# Patient Record
Sex: MALE | Race: WHITE | NOT HISPANIC OR LATINO | Employment: OTHER | URBAN - METROPOLITAN AREA
[De-identification: names, ages, dates, MRNs, and addresses within clinical notes are randomized per-mention and may not be internally consistent; named-entity substitution may affect disease eponyms.]

---

## 2017-01-22 ENCOUNTER — TELEPHONE (OUTPATIENT)
Dept: NURSING | Facility: CLINIC | Age: 37
End: 2017-01-22

## 2017-01-22 NOTE — TELEPHONE ENCOUNTER
"Call Type: Triage Call    Presenting Problem: Neck pain when turn neck to the right ; that  started  this morning  , after crackling sensation from stretching .    Triage Note:  Guideline Title: Neck Pain  Recommended Disposition: See Provider within 72 Hours  Original Inclination: Did not know what to do  Override Disposition:  Intended Action: See /Naeem Appt  Physician Contacted: No  Neck pain and decreased range of motion in neck, shoulder, or arm AND has not  responded to 48 hours of home care ?  YES  Painful involuntary spasm of neck or jaw WITHOUT injury ? NO  Severe breathing problems ? NO  Injury to the neck ? NO  New or worsening signs and symptoms that may indicate shock ? NO  Neck mass/swelling ? NO  Head injury ? NO  Neck pain (no injury) AND any temperature elevation in an immunocompromised  individual or frail elderly ? NO  Choking sensation, cannot swallow own saliva with associated drooling and soft  muffled voice ? NO  Seizure now or within last 6 hours ? NO  New onset or unexplained change in bowel or bladder control (unable to urinate and  full feeling or loss of control of bowel or bladder) ? NO  Any other cardiac signs/symptoms for more than 5 minutes, now or within last hour.  Pain is NOT associated with taking a deep breath or a productive cough, movement,  or touch to a localized area on the chest or upper body. ? NO  Spasm or pain WITHOUT injury AND current or recent use of phenothiazines  (Compazine, Thorazine, Mellaril, Prolixin, Loxitane, Haldol, etc.) ? NO  Neck pain AND symptoms of viral illness that are not improved OR are getting worse  with 24 hours of home care ? NO  Sudden, severe disabling head pain OR caller spontaneously verbalizes \"worst  headache of my life\" ? NO  New onset of neck pain with forward head movement (no injury) AND severe  generalized headache, fever, or altered mental status ? NO  New onset of unbearable pain within last 24 hours ? NO  Unexplained " blood-colored (purple or red) flat pinpoint dots, spots or patches on  the skin ? NO  Physician Instructions:  Care Advice: Call provider if symptoms worsen or new symptoms develop.  Maintain good posture. Avoid putting pressure on a nerve by not carrying  heavy objects such as computer case or backpack. Avoid overuse activities -  alternate activities by switching sides and limit length of activity. Avoid  lifting heavy objects.  SYMPTOM / CONDITION MANAGEMENT  For some relief, try using a heating pad on low or medium for 15 - 20  minutes every 2 or 3 hours or take a warm shower.  A microwave heating pad  can also be used to provide warm moist heat.

## 2017-01-26 ENCOUNTER — OFFICE VISIT (OUTPATIENT)
Dept: FAMILY MEDICINE | Facility: CLINIC | Age: 37
End: 2017-01-26
Payer: MEDICARE

## 2017-01-26 VITALS
HEIGHT: 75 IN | DIASTOLIC BLOOD PRESSURE: 82 MMHG | TEMPERATURE: 98.7 F | HEART RATE: 88 BPM | RESPIRATION RATE: 17 BRPM | OXYGEN SATURATION: 98 % | BODY MASS INDEX: 33.31 KG/M2 | SYSTOLIC BLOOD PRESSURE: 130 MMHG | WEIGHT: 267.9 LBS

## 2017-01-26 DIAGNOSIS — R05.9 COUGH: ICD-10-CM

## 2017-01-26 DIAGNOSIS — J06.9 UPPER RESPIRATORY TRACT INFECTION, UNSPECIFIED TYPE: Primary | ICD-10-CM

## 2017-01-26 PROCEDURE — 99213 OFFICE O/P EST LOW 20 MIN: CPT | Performed by: PHYSICIAN ASSISTANT

## 2017-01-26 RX ORDER — AZITHROMYCIN 250 MG/1
TABLET, FILM COATED ORAL
Qty: 6 TABLET | Refills: 0 | Status: SHIPPED | OUTPATIENT
Start: 2017-01-26 | End: 2017-08-30

## 2017-01-26 RX ORDER — BENZONATATE 200 MG/1
200 CAPSULE ORAL 3 TIMES DAILY PRN
Qty: 21 CAPSULE | Refills: 0 | Status: SHIPPED | OUTPATIENT
Start: 2017-01-26 | End: 2017-08-30

## 2017-01-26 NOTE — Clinical Note
[facility]  [address]  [facility] [address]  [phone]  Dept: [phone]      1/26/2017    Re: Rafa Cochran      TO WHOM IT MAY CONCERN:    Rafa Cochran  was seen on 1/2617.  Please excuse him today due to illness.    Cordially    Jose Michel PA-C  [facility]

## 2017-01-26 NOTE — PROGRESS NOTES
"  SUBJECTIVE:                                                    Rafa oCchran is a 36 year old male who presents to clinic today for the following health issues:      RESPIRATORY SYMPTOMS      Duration: 1-1.5 weeks    Description  nasal congestion, rhinorrhea, cough, fever and fatigue/malaise- breathing has become more difficult.    Severity: moderate    Accompanying signs and symptoms: None    History (predisposing factors):  none    Precipitating or alleviating factors: None    Therapies tried and outcome:  none           Problem list and histories reviewed & adjusted, as indicated.  Additional history: 37 y/o male c/o not feeling well for the last 2 weeks.  Admits to the above symptoms with cough, congestion being the worst.  He is very busy with work and school, and has not been feeling well.  He feels that his anxiety has been worse with not feeling well.      Problem list, Medication list, Allergies, and Medical/Social/Surgical histories reviewed in EPIC and updated as appropriate.    ROS:  Constitutional, HEENT, cardiovascular, pulmonary, gi and gu systems are negative, except as otherwise noted.    OBJECTIVE:                                                    /82 mmHg  Pulse 88  Temp(Src) 98.7  F (37.1  C) (Oral)  Resp 17  Ht 6' 3\" (1.905 m)  Wt 267 lb 14.4 oz (121.519 kg)  BMI 33.49 kg/m2  SpO2 98%  Body mass index is 33.49 kg/(m^2).  GENERAL: alert and no distress  EYES: Eyes grossly normal to inspection  HENT: normal cephalic/atraumatic, ear canals and TM's normal, nasal mucosa edematous , rhinorrhea yellow, oropharynx clear and oral mucous membranes moist  RESP: rhonchi throughout  CV: regular rate and rhythm, normal S1 S2, no S3 or S4, no murmur, click or rub, no peripheral edema and peripheral pulses strong    Diagnostic Test Results:  none      ASSESSMENT/PLAN:                                                            1. Upper respiratory tract infection, unspecified type  Will treat " based on time frame.  - azithromycin (ZITHROMAX) 250 MG tablet; Two tablets first day, then one tablet daily for four days.  Dispense: 6 tablet; Refill: 0  - benzonatate (TESSALON) 200 MG capsule; Take 1 capsule (200 mg) by mouth 3 times daily as needed for cough  Dispense: 21 capsule; Refill: 0    2. Cough    - azithromycin (ZITHROMAX) 250 MG tablet; Two tablets first day, then one tablet daily for four days.  Dispense: 6 tablet; Refill: 0  - benzonatate (TESSALON) 200 MG capsule; Take 1 capsule (200 mg) by mouth 3 times daily as needed for cough  Dispense: 21 capsule; Refill: 0    Follow up if symptoms persist or worsen     Jose Michel PA-C  [facility]

## 2017-01-26 NOTE — MR AVS SNAPSHOT
"              After Visit Summary   1/26/2017    Rafa Cochran    MRN: [id number]           Patient Information     Date Of Birth          1980        Visit Information        Provider Department      1/26/2017 11:00 AM Jose Michel PA-C [facility]        Today's Diagnoses     Upper respiratory tract infection, unspecified type    -  1     Cough            Follow-ups after your visit        Who to contact     If you have questions or need follow up information about today's clinic visit or your schedule please contact [facility] directly at [phone].  Normal or non-critical lab and imaging results will be communicated to you by Flagrhart, letter or phone within 4 business days after the clinic has received the results. If you do not hear from us within 7 days, please contact the clinic through Identiat or phone. If you have a critical or abnormal lab result, we will notify you by phone as soon as possible.  Submit refill requests through [employer]iv[employer] or call your pharmacy and they will forward the refill request to us. Please allow 3 business days for your refill to be completed.          Additional Information About Your Visit        MyChart Information     Tivra gives you secure access to your electronic health record. If you see a primary care provider, you can also send messages to your care team and make appointments. If you have questions, please call your primary care clinic.  If you do not have a primary care provider, please call [phone] and they will assist you.        Care EveryWhere ID     This is your Care EveryWhere ID. This could be used by other organizations to access your [place] medical records  [id number]        Your Vitals Were     Pulse Temperature Respirations Height BMI (Body Mass Index) Pulse Oximetry    88 98.7  F (37.1  C) (Oral) 17 6' 3\" (1.905 m) 33.49 kg/m2 98%       Blood Pressure from Last 3 Encounters:   01/26/17 130/82   07/06/16 120/70 "   05/19/16 124/70    Weight from Last 3 Encounters:   01/26/17 267 lb 14.4 oz (121.519 kg)   07/06/16 239 lb 8 oz (108.636 kg)   05/19/16 243 lb (110.224 kg)              Today, you had the following     No orders found for display         Today's Medication Changes          These changes are accurate as of: 1/26/17 11:14 AM.  If you have any questions, ask your nurse or doctor.               Start taking these medicines.        Dose/Directions    azithromycin 250 MG tablet   Commonly known as:  ZITHROMAX   Used for:  Upper respiratory tract infection, unspecified type, Cough   Started by:  Jose Michel PA-C        Two tablets first day, then one tablet daily for four days.   Quantity:  6 tablet   Refills:  0       benzonatate 200 MG capsule   Commonly known as:  TESSALON   Used for:  Upper respiratory tract infection, unspecified type, Cough   Started by:  Jose Michel PA-C        Dose:  200 mg   Take 1 capsule (200 mg) by mouth 3 times daily as needed for cough   Quantity:  21 capsule   Refills:  0            Where to get your medicines      These medications were sent to [facility] - [place], [place] - [facility] [address] AT [facility] & [address] , [facility] [address]     Phone:  [phone]    - azithromycin 250 MG tablet  - benzonatate 200 MG capsule             Primary Care Provider Office Phone # Fax #    Linda Payne [phone]       [address] [address]        Thank you!     Thank you for choosing [facility]  for your care. Our goal is always to provide you with excellent care. Hearing back from our patients is one way we can continue to improve our services. Please take a few minutes to complete the written survey that you may receive in the mail after your visit with us. Thank you!             Your Updated Medication List - Protect others around you: Learn  how to safely use, store and throw away your medicines at www.disposemymeds.org.          This list is accurate as of: 1/26/17 11:14 AM.  Always use your most recent med list.                   Brand Name Dispense Instructions for use    azithromycin 250 MG tablet    ZITHROMAX    6 tablet    Two tablets first day, then one tablet daily for four days.       benzonatate 200 MG capsule    TESSALON    21 capsule    Take 1 capsule (200 mg) by mouth 3 times daily as needed for cough       LORazepam 2 MG tablet    ATIVAN    30 tablet    Take  by mouth 4 times daily.       mirtazapine 15 MG tablet    REMERON    90 tablet    Take 2 tablets (30 mg) by mouth At Bedtime       order for DME      autoCPAP 8cm

## 2017-01-26 NOTE — NURSING NOTE
"Chief Complaint   Patient presents with     URI     Cough x1-1.5 weeks     /82 mmHg  Pulse 88  Temp(Src) 98.7  F (37.1  C) (Oral)  Resp 17  Ht 6' 3\" (1.905 m)  Wt 267 lb 14.4 oz (121.519 kg)  BMI 33.49 kg/m2  SpO2 98% Estimated body mass index is 33.49 kg/(m^2) as calculated from the following:    Height as of this encounter: 6' 3\" (1.905 m).    Weight as of this encounter: 267 lb 14.4 oz (121.519 kg).  bp completed using cuff size: large  right        Health Maintenance Due Pending Provider Review:  PHQ9    Patient will complete PHQ-9 when he feels better.    Jonna Jha MA  [facility]    "

## 2017-08-30 ENCOUNTER — OFFICE VISIT (OUTPATIENT)
Dept: FAMILY MEDICINE | Facility: CLINIC | Age: 37
End: 2017-08-30
Payer: MEDICARE

## 2017-08-30 VITALS
BODY MASS INDEX: 33.2 KG/M2 | HEIGHT: 75 IN | SYSTOLIC BLOOD PRESSURE: 120 MMHG | TEMPERATURE: 98 F | OXYGEN SATURATION: 96 % | HEART RATE: 112 BPM | RESPIRATION RATE: 16 BRPM | DIASTOLIC BLOOD PRESSURE: 70 MMHG | WEIGHT: 267 LBS

## 2017-08-30 DIAGNOSIS — Z13.220 SCREENING FOR CHOLESTEROL LEVEL: ICD-10-CM

## 2017-08-30 DIAGNOSIS — J06.9 UPPER RESPIRATORY TRACT INFECTION, UNSPECIFIED TYPE: Primary | ICD-10-CM

## 2017-08-30 LAB
ERYTHROCYTE [DISTWIDTH] IN BLOOD BY AUTOMATED COUNT: 13.1 % (ref 10–15)
HCT VFR BLD AUTO: 41.4 % (ref 40–53)
HETEROPH AB SER QL: NEGATIVE
HGB BLD-MCNC: 14.3 G/DL (ref 13.3–17.7)
MCH RBC QN AUTO: 30 PG (ref 26.5–33)
MCHC RBC AUTO-ENTMCNC: 34.5 G/DL (ref 31.5–36.5)
MCV RBC AUTO: 87 FL (ref 78–100)
PLATELET # BLD AUTO: 310 10E9/L (ref 150–450)
RBC # BLD AUTO: 4.77 10E12/L (ref 4.4–5.9)
WBC # BLD AUTO: 11 10E9/L (ref 4–11)

## 2017-08-30 PROCEDURE — 86308 HETEROPHILE ANTIBODY SCREEN: CPT | Performed by: NURSE PRACTITIONER

## 2017-08-30 PROCEDURE — 85027 COMPLETE CBC AUTOMATED: CPT | Performed by: NURSE PRACTITIONER

## 2017-08-30 PROCEDURE — 99213 OFFICE O/P EST LOW 20 MIN: CPT | Performed by: NURSE PRACTITIONER

## 2017-08-30 PROCEDURE — 36415 COLL VENOUS BLD VENIPUNCTURE: CPT | Performed by: NURSE PRACTITIONER

## 2017-08-30 RX ORDER — MIRTAZAPINE 30 MG/1
TABLET, FILM COATED ORAL
Refills: 3 | COMMUNITY
Start: 2017-08-20 | End: 2019-11-19

## 2017-08-30 ASSESSMENT — PATIENT HEALTH QUESTIONNAIRE - PHQ9
SUM OF ALL RESPONSES TO PHQ QUESTIONS 1-9: 6
5. POOR APPETITE OR OVEREATING: NEARLY EVERY DAY

## 2017-08-30 ASSESSMENT — ANXIETY QUESTIONNAIRES
2. NOT BEING ABLE TO STOP OR CONTROL WORRYING: MORE THAN HALF THE DAYS
GAD7 TOTAL SCORE: 16
IF YOU CHECKED OFF ANY PROBLEMS ON THIS QUESTIONNAIRE, HOW DIFFICULT HAVE THESE PROBLEMS MADE IT FOR YOU TO DO YOUR WORK, TAKE CARE OF THINGS AT HOME, OR GET ALONG WITH OTHER PEOPLE: NOT DIFFICULT AT ALL
6. BECOMING EASILY ANNOYED OR IRRITABLE: NEARLY EVERY DAY
7. FEELING AFRAID AS IF SOMETHING AWFUL MIGHT HAPPEN: NEARLY EVERY DAY
5. BEING SO RESTLESS THAT IT IS HARD TO SIT STILL: NOT AT ALL
1. FEELING NERVOUS, ANXIOUS, OR ON EDGE: NEARLY EVERY DAY
3. WORRYING TOO MUCH ABOUT DIFFERENT THINGS: MORE THAN HALF THE DAYS

## 2017-08-30 NOTE — NURSING NOTE
"Chief Complaint   Patient presents with     URI       Initial /70 (BP Location: Right arm, Patient Position: Sitting, Cuff Size: Adult Large)  Pulse 112  Temp 98  F (36.7  C) (Oral)  Resp 16  Ht 1.905 m (6' 3\")  Wt 121.1 kg (267 lb)  SpO2 96%  BMI 33.37 kg/m2 Estimated body mass index is 33.37 kg/(m^2) as calculated from the following:    Height as of this encounter: 1.905 m (6' 3\").    Weight as of this encounter: 121.1 kg (267 lb).  Medication Reconciliation: ivette Douglas        "

## 2017-08-30 NOTE — LETTER
My Depression Action Plan  Name: Rafa Cochran   Date of Birth 1980  Date: 8/30/2017    My doctor: Linda Payne   My clinic: [address] [address] [phone]          GREEN    ZONE   Good Control    What it looks like:     Things are going generally well. You have normal up s and down s. You may even feel depressed from time to time, but bad moods usually last less than a day.   What you need to do:  1. Continue to care for yourself (see self care plan)  2. Check your depression survival kit and update it as needed  3. Follow your physician s recommendations including any medication.  4. Do not stop taking medication unless you consult with your physician first.           YELLOW         ZONE Getting Worse    What it looks like:     Depression is starting to interfere with your life.     It may be hard to get out of bed; you may be starting to isolate yourself from others.    Symptoms of depression are starting to last most all day and this has happened for several days.     You may have suicidal thoughts but they are not constant.   What you need to do:     1. Call your care team, your response to treatment will improve if you keep your care team informed of your progress. Yellow periods are signs an adjustment may need to be made.     2. Continue your self-care, even if you have to fake it!    3. Talk to someone in your support network    4. Open up your depression survival kit           RED    ZONE Medical Alert - Get Help    What it looks like:     Depression is seriously interfering with your life.     You may experience these or other symptoms: You can t get out of bed most days, can t work or engage in other necessary activities, you have trouble taking care of basic hygiene, or basic responsibilities, thoughts of suicide or death that will not go away, self-injurious behavior.     What you need to do:  1. Call your care  team and request a same-day appointment. If they are not available (weekends or after hours) call your local crisis line, emergency room or 911.      Electronically signed by: Cherrie Douglas, August 30, 2017    Depression Self Care Plan / Survival Kit    Self-Care for Depression  Here s the deal. Your body and mind are really not as separate as most people think.  What you do and think affects how you feel and how you feel influences what you do and think. This means if you do things that people who feel good do, it will help you feel better.  Sometimes this is all it takes.  There is also a place for medication and therapy depending on how severe your depression is, so be sure to consult with your medical provider and/ or Behavioral Health Consultant if your symptoms are worsening or not improving.     In order to better manage my stress, I will:    Exercise  Get some form of exercise, every day. This will help reduce pain and release endorphins, the  feel good  chemicals in your brain. This is almost as good as taking antidepressants!  This is not the same as joining a gym and then never going! (they count on that by the way ) It can be as simple as just going for a walk or doing some gardening, anything that will get you moving.      Hygiene   Maintain good hygiene (Get out of bed in the morning, Make your bed, Brush your teeth, Take a shower, and Get dressed like you were going to work, even if you are unemployed).  If your clothes don't fit try to get ones that do.    Diet  I will strive to eat foods that are good for me, drink plenty of water, and avoid excessive sugar, caffeine, alcohol, and other mood-altering substances.  Some foods that are helpful in depression are: complex carbohydrates, B vitamins, flaxseed, fish or fish oil, fresh fruits and vegetables.    Psychotherapy  I agree to participate in Individual Therapy (if recommended).    Medication  If prescribed medications, I agree to take them.   Missing doses can result in serious side effects.  I understand that drinking alcohol, or other illicit drug use, may cause potential side effects.  I will not stop my medication abruptly without first discussing it with my provider.    Staying Connected With Others  I will stay in touch with my friends, family members, and my primary care provider/team.    Use your imagination  Be creative.  We all have a creative side; it doesn t matter if it s oil painting, sand castles, or mud pies! This will also kick up the endorphins.    Witness Beauty  (AKA stop and smell the roses) Take a look outside, even in mid-winter. Notice colors, textures. Watch the squirrels and birds.     Service to others  Be of service to others.  There is always someone else in need.  By helping others we can  get out of ourselves  and remember the really important things.  This also provides opportunities for practicing all the other parts of the program.    Humor  Laugh and be silly!  Adjust your TV habits for less news and crime-drama and more comedy.    Control your stress  Try breathing deep, massage therapy, biofeedback, and meditation. Find time to relax each day.     My support system    Clinic Contact:  Phone number:    Contact 1:  Phone number:    Contact 2:  Phone number:    Restorationist/:  Phone number:    Therapist:  Phone number:    Local crisis center:    Phone number:    Other community support:  Phone number:

## 2017-08-30 NOTE — MR AVS SNAPSHOT
After Visit Summary   8/30/2017    Rafa Cochran    MRN: [id number]           Patient Information     Date Of Birth          1980        Visit Information        Provider Department      8/30/2017 3:00 PM Francoise An NP [facility]        Today's Diagnoses     Upper respiratory tract infection, unspecified type    -  1    Screening for cholesterol level           Follow-ups after your visit        Future tests that were ordered for you today     Open Future Orders        Priority Expected Expires Ordered    Lipid panel reflex to direct LDL Routine  8/30/2018 8/30/2017            Who to contact     If you have questions or need follow up information about today's clinic visit or your schedule please contact [facility] directly at [phone].  Normal or non-critical lab and imaging results will be communicated to you by MyChart, letter or phone within 4 business days after the clinic has received the results. If you do not hear from us within 7 days, please contact the clinic through [employer] Caf[employer] or phone. If you have a critical or abnormal lab result, we will notify you by phone as soon as possible.  Submit refill requests through [employer] or call your pharmacy and they will forward the refill request to us. Please allow 3 business days for your refill to be completed.          Additional Information About Your Visit        MyChart Information     [employer] gives you secure access to your electronic health record. If you see a primary care provider, you can also send messages to your care team and make appointments. If you have questions, please call your primary care clinic.  If you do not have a primary care provider, please call [phone] and they will assist you.        Care EveryWhere ID     This is your Care EveryWhere ID. This could be used by other organizations to access your [place] medical records  [id number]        Your Vitals Were     Pulse Temperature  "Respirations Height Pulse Oximetry BMI (Body Mass Index)    112 98  F (36.7  C) (Oral) 16 1.905 m (6' 3\") 96% 33.37 kg/m2       Blood Pressure from Last 3 Encounters:   08/30/17 120/70   01/26/17 130/82   07/06/16 120/70    Weight from Last 3 Encounters:   08/30/17 121.1 kg (267 lb)   01/26/17 121.5 kg (267 lb 14.4 oz)   07/06/16 108.6 kg (239 lb 8 oz)              We Performed the Following     CBC with platelets     Mononucleosis screen        Primary Care Provider Office Phone # Fax #    Linda Payne [phone] [address] [address]        Equal Access to Services     [facility] : Hadii alonso davidson Socharity, waaxda luqadaha, qaybta kaalmada jaime, elisabeth ross . So [facility] [phone].    ATENCIÓN: Si habla español, tiene a marin disposición servicios gratuitos de asistencia lingüística. Randa al [phone].    We comply with applicable federal civil rights laws and [place] laws. We do not discriminate on the basis of race, color, national origin, age, disability sex, sexual orientation or gender identity.            Thank you!     Thank you for choosing [facility]  for your care. Our goal is always to provide you with excellent care. Hearing back from our patients is one way we can continue to improve our services. Please take a few minutes to complete the written survey that you may receive in the mail after your visit with us. Thank you!             Your Updated Medication List - Protect others around you: Learn how to safely use, store and throw away your medicines at www.disposemymeds.org.          This list is accurate as of: 8/30/17  3:26 PM.  Always use your most recent med list.                   Brand Name Dispense Instructions for use Diagnosis    LORazepam 2 MG tablet    ATIVAN    30 tablet    Take  by mouth 4 times daily.        mirtazapine 30 MG tablet    REMERON     TK 1 T PO HS        " order for DME      autoCPAP 8cm

## 2017-08-30 NOTE — PROGRESS NOTES
SUBJECTIVE:   Rafa Cochran is a 37 year old male who presents to clinic today for the following health issues:      Acute Illness   Acute illness concerns: URI  Onset: 3 days ago    Fever: no    Chills/Sweats: YES    Headache (location?): YES    Sinus Pressure:YES    Conjunctivitis:  no    Ear Pain: YES: left    Rhinorrhea: YES    Congestion: YES    Sore Throat: YES     Cough: YES    Wheeze: no    Decreased Appetite: no    Nausea: no    Vomiting: no    Diarrhea:  no    Dysuria/Freq.: no    Fatigue/Achiness: YES    Sick/Strep Exposure: no     Therapies Tried and outcome: tylenol helped a little bit    [employer] school: lots of students sick. Yellow phlegm coughing. No SOB/chest pain. Slight swollen on left neck, woke up like that, and slightly painful on left ear.        Problem list and histories reviewed & adjusted, as indicated.  Additional history: as documented    Patient Active Problem List   Diagnosis     Major depression in partial remission (H)     Bipolar affective disorder (H)     OCD (obsessive compulsive disorder)     Schizoaffective disorder (H)     Sinus tachycardia     Palpitations     Hyperlipidemia LDL goal <160     Hypertriglyceridemia     Prediabetes     Panic attack     BABATUNDE (obstructive sleep apnea)- moderate (AHI 17)     Pain in joint of right shoulder     Sprain and strain of shoulder and upper arm, left, initial encounter     Shoulder pain, left     Past Surgical History:   Procedure Laterality Date     APPENDECTOMY         Social History   Substance Use Topics     Smoking status: Former Smoker     Quit date: 12/25/2013     Smokeless tobacco: Never Used      Comment: 5 cigs per day     Alcohol use 0.0 oz/week     0 Standard drinks or equivalent per week      Comment: rare use     Family History   Problem Relation Age of Onset     C.A.D. Maternal Grandfather      DIABETES Maternal Grandfather      Hypertension Maternal Grandfather      HEART DISEASE Maternal Grandfather      C.A.D. Paternal  "Grandmother      DIABETES Paternal Grandmother      Circulatory Paternal Grandmother      GASTROINTESTINAL DISEASE Paternal Grandmother      C.A.D. Paternal Grandfather      Hypertension Paternal Grandfather      Circulatory Paternal Grandfather      HEART DISEASE Paternal Grandfather      GASTROINTESTINAL DISEASE Paternal Grandfather      Hypertension Father      Alcohol/Drug Father      Allergies Father      Depression Father      GASTROINTESTINAL DISEASE Father      Prostate Cancer Father      CEREBROVASCULAR DISEASE Maternal Grandmother      Arthritis Maternal Grandmother      HEART DISEASE Maternal Grandmother      Alcohol/Drug Mother      Allergies Mother      Circulatory Mother      Depression Mother      HEART DISEASE Mother      DIABETES Mother      Alcohol/Drug Sister      Allergies Sister      Depression Sister      Genitourinary Problems Sister          Current Outpatient Prescriptions   Medication Sig Dispense Refill     mirtazapine (REMERON) 30 MG tablet TK 1 T PO HS  3     ORDER FOR DME autoCPAP 8cm       lorazepam (ATIVAN) 2 MG tablet Take  by mouth 4 times daily. 30 tablet 0     [DISCONTINUED] mirtazapine (REMERON) 15 MG tablet Take 2 tablets (30 mg) by mouth At Bedtime 90 tablet 0     Allergies   Allergen Reactions     Nicotine Rash     Nicotine Patches     Pertussis Toxoid      Bad reaction as a child         Reviewed and updated as needed this visit by clinical staffTobacco  Allergies  Meds  Problems  Med Hx  Surg Hx  Fam Hx  Soc Hx        Reviewed and updated as needed this visit by Provider  Allergies  Meds  Problems         ROS:  Constitutional, HEENT, cardiovascular, pulmonary systems are negative, except as otherwise noted.      OBJECTIVE:   /70 (BP Location: Right arm, Patient Position: Sitting, Cuff Size: Adult Large)  Pulse 112  Temp 98  F (36.7  C) (Oral)  Resp 16  Ht 1.905 m (6' 3\")  Wt 121.1 kg (267 lb)  SpO2 96%  BMI 33.37 kg/m2  Body mass index is 33.37 " kg/(m^2).  GENERAL: tired, alert and no distress  EYES: Eyes grossly normal to inspection, PERRL and conjunctivae and sclerae normal  HENT: ear canals and TM's normal, nose and mouth without ulcers or lesions. Posterior pharynx slight erythema. No sinus pressure. Left ear slight tender no drainage  NECK: no adenopathy, no asymmetry, masses, or scars and thyroid difficult to palpate due to body habitus. Left neck slightly more swollen than right, slightly tender   RESP: lungs clear to diminished to auscultation - no rales, rhonchi or wheezes  CV: regular rate and rhythm, normal S1 S2, no S3 or S4, no murmur, click or rub  MS: no gross musculoskeletal defects noted, no edema    Diagnostic Test Results:  none     ASSESSMENT/PLAN:         1. Upper respiratory tract infection, unspecified type  Likely viral but check for mono and elevated WBC. Call if symptoms not improving over next few days and can start antibiotic then. Stay home from school tomorrow if you can, rest.   - CBC with platelets  - Mononucleosis screen    2. Screening for cholesterol level  Check in future  - Lipid panel reflex to direct LDL; Future    FUTURE APPOINTMENTS:       - Follow-up visit prn not improving    NAS Smith, NP-C  [facility]

## 2017-08-31 ASSESSMENT — ANXIETY QUESTIONNAIRES: GAD7 TOTAL SCORE: 16

## 2017-09-04 ENCOUNTER — NURSE TRIAGE (OUTPATIENT)
Dept: NURSING | Facility: CLINIC | Age: 37
End: 2017-09-04

## 2017-09-04 NOTE — TELEPHONE ENCOUNTER
"\"I was seen on 8/30/17 and was told to call back if my symptoms are not better within a few days.  I would like an antibiotic..\"  Per patient's request, writer paged on-call Dr. Mcguire at 5:38pm via Smart Web to [phone].  A second page was sent at 5:57pm via Smart Web.  Dr. Mcguire returned FNA call at 6:00pm, states patient will need to call the clinic tomorrow morning with this request.     Cesilia Klein RN/FNA    "

## 2017-09-11 ENCOUNTER — OFFICE VISIT (OUTPATIENT)
Dept: FAMILY MEDICINE | Facility: CLINIC | Age: 37
End: 2017-09-11
Payer: MEDICARE

## 2017-09-11 VITALS
HEART RATE: 117 BPM | TEMPERATURE: 98.9 F | DIASTOLIC BLOOD PRESSURE: 70 MMHG | WEIGHT: 270.5 LBS | OXYGEN SATURATION: 95 % | BODY MASS INDEX: 33.81 KG/M2 | SYSTOLIC BLOOD PRESSURE: 120 MMHG | RESPIRATION RATE: 14 BRPM

## 2017-09-11 DIAGNOSIS — J06.9 UPPER RESPIRATORY TRACT INFECTION, UNSPECIFIED TYPE: Primary | ICD-10-CM

## 2017-09-11 DIAGNOSIS — R07.0 THROAT PAIN: ICD-10-CM

## 2017-09-11 LAB
DEPRECATED S PYO AG THROAT QL EIA: NORMAL
SPECIMEN SOURCE: NORMAL

## 2017-09-11 PROCEDURE — 99213 OFFICE O/P EST LOW 20 MIN: CPT | Performed by: NURSE PRACTITIONER

## 2017-09-11 PROCEDURE — 87081 CULTURE SCREEN ONLY: CPT | Performed by: NURSE PRACTITIONER

## 2017-09-11 PROCEDURE — 87880 STREP A ASSAY W/OPTIC: CPT | Performed by: NURSE PRACTITIONER

## 2017-09-11 RX ORDER — AMOXICILLIN AND CLAVULANATE POTASSIUM 500; 125 MG/1; MG/1
1 TABLET, FILM COATED ORAL 2 TIMES DAILY
Qty: 14 TABLET | Refills: 0 | Status: SHIPPED | OUTPATIENT
Start: 2017-09-11 | End: 2017-09-18

## 2017-09-11 NOTE — PROGRESS NOTES
SUBJECTIVE:   Rafa Cochran is a 37 year old male who presents to clinic today for the following health issues:      Acute Illness   Acute illness concerns: cough  Onset: 3 weeks ago    Fever: no    Chills/Sweats: YES    Headache (location?): YES    Sinus Pressure:YES    Conjunctivitis:  no    Ear Pain: no    Rhinorrhea: YES    Congestion: YES    Sore Throat: YES     Cough: YES    Wheeze: YES    Decreased Appetite: no    Nausea: YES    Vomiting: no    Diarrhea:  no    Dysuria/Freq.: no    Fatigue/Achiness: YES    Sick/Strep Exposure: no     Therapies Tried and outcome: tylenol which helps a little bit    Saw provider on 8/30/17 with similar symptoms. Last week felt like it was getting a little better, now this week seems to be worsening. Exhausted, weak. Feels like he is eating/drinking enough. Has chest pain with coughing-lungs feel achy from coughing. Throat hurts more this time also. He feels miserable.       Problem list and histories reviewed & adjusted, as indicated.  Additional history: as documented    Patient Active Problem List   Diagnosis     Major depression in partial remission (H)     Bipolar affective disorder (H)     OCD (obsessive compulsive disorder)     Schizoaffective disorder (H)     Sinus tachycardia     Palpitations     Hyperlipidemia LDL goal <160     Hypertriglyceridemia     Prediabetes     Panic attack     BABATUNDE (obstructive sleep apnea)- moderate (AHI 17)     Pain in joint of right shoulder     Sprain and strain of shoulder and upper arm, left, initial encounter     Shoulder pain, left     Past Surgical History:   Procedure Laterality Date     APPENDECTOMY         Social History   Substance Use Topics     Smoking status: Former Smoker     Quit date: 12/25/2013     Smokeless tobacco: Never Used      Comment: 5 cigs per day     Alcohol use 0.0 oz/week     0 Standard drinks or equivalent per week      Comment: rare use     Family History   Problem Relation Age of Onset     C.A.D. Maternal  Grandfather      DIABETES Maternal Grandfather      Hypertension Maternal Grandfather      HEART DISEASE Maternal Grandfather      C.A.D. Paternal Grandmother      DIABETES Paternal Grandmother      Circulatory Paternal Grandmother      GASTROINTESTINAL DISEASE Paternal Grandmother      C.A.D. Paternal Grandfather      Hypertension Paternal Grandfather      Circulatory Paternal Grandfather      HEART DISEASE Paternal Grandfather      GASTROINTESTINAL DISEASE Paternal Grandfather      Hypertension Father      Alcohol/Drug Father      Allergies Father      Depression Father      GASTROINTESTINAL DISEASE Father      Prostate Cancer Father      CEREBROVASCULAR DISEASE Maternal Grandmother      Arthritis Maternal Grandmother      HEART DISEASE Maternal Grandmother      Alcohol/Drug Mother      Allergies Mother      Circulatory Mother      Depression Mother      HEART DISEASE Mother      DIABETES Mother      Alcohol/Drug Sister      Allergies Sister      Depression Sister      Genitourinary Problems Sister          Current Outpatient Prescriptions   Medication Sig Dispense Refill     amoxicillin-clavulanate (AUGMENTIN) 500-125 MG per tablet Take 1 tablet by mouth 2 times daily for 7 days 14 tablet 0     mirtazapine (REMERON) 30 MG tablet TK 1 T PO HS  3     ORDER FOR DME autoCPAP 8cm       lorazepam (ATIVAN) 2 MG tablet Take  by mouth 4 times daily. 30 tablet 0     Allergies   Allergen Reactions     Nicotine Rash     Nicotine Patches     Pertussis Toxoid      Bad reaction as a child         Reviewed and updated as needed this visit by clinical staffTobacco  Allergies  Meds  Problems  Med Hx  Surg Hx  Fam Hx  Soc Hx        Reviewed and updated as needed this visit by Provider  Allergies  Meds  Problems         ROS:  Constitutional, HEENT, cardiovascular, pulmonary, gi and gu systems are negative, except as otherwise noted.      OBJECTIVE:   /70 (BP Location: Right arm, Patient Position: Sitting, Cuff Size:  Adult Large)  Pulse 117  Temp 98.9  F (37.2  C) (Oral)  Resp 14  Wt 122.7 kg (270 lb 8 oz)  SpO2 95%  BMI 33.81 kg/m2  Body mass index is 33.81 kg/(m^2).  GENERAL: ill appearing, alert and no distress  EYES: Eyes grossly normal to inspection, PERRL and conjunctivae and sclerae normal  HENT: ear canals and TM's normal, nose and mouth without ulcers or lesions. Posterior pharynx slight erythema  NECK: no adenopathy, no asymmetry, masses, or scars and thyroid normal to palpation  RESP: lungs clear to diminished to auscultation - no rales, rhonchi or wheezes. No SOB at this time  CV: regular rate and rhythm, normal S1 S2, no S3 or S4, no murmur, click or rub, no chest pain at this time  ABDOMEN: soft, obese, nontender, no hepatosplenomegaly, no masses and bowel sounds normal  MS: no gross musculoskeletal defects noted, no edema    Diagnostic Test Results:  Strep test    ASSESSMENT/PLAN:         1. Upper respiratory tract infection, unspecified type  Letter written for patient to reschedule exam tomorrow if needed and if not feeling better. Call NP on Thurs/Fri to update on how feeling: he should be feeling better by then with antibiotics  - amoxicillin-clavulanate (AUGMENTIN) 500-125 MG per tablet; Take 1 tablet by mouth 2 times daily for 7 days  Dispense: 14 tablet; Refill: 0    2. Throat pain  Test was negative  - Strep, Rapid Screen    FUTURE APPOINTMENTS:       - Follow-up visit in 5-7 days if not improving    NAS Smith, NP-C  [facility]

## 2017-09-11 NOTE — LETTER
My Depression Action Plan  Name: Rafa Cochran   Date of Birth 1980  Date: 9/11/2017    My doctor: Linda Payne   My clinic: [address] [address] [phone]          GREEN    ZONE   Good Control    What it looks like:     Things are going generally well. You have normal up s and down s. You may even feel depressed from time to time, but bad moods usually last less than a day.   What you need to do:  1. Continue to care for yourself (see self care plan)  2. Check your depression survival kit and update it as needed  3. Follow your physician s recommendations including any medication.  4. Do not stop taking medication unless you consult with your physician first.           YELLOW         ZONE Getting Worse    What it looks like:     Depression is starting to interfere with your life.     It may be hard to get out of bed; you may be starting to isolate yourself from others.    Symptoms of depression are starting to last most all day and this has happened for several days.     You may have suicidal thoughts but they are not constant.   What you need to do:     1. Call your care team, your response to treatment will improve if you keep your care team informed of your progress. Yellow periods are signs an adjustment may need to be made.     2. Continue your self-care, even if you have to fake it!    3. Talk to someone in your support network    4. Open up your depression survival kit           RED    ZONE Medical Alert - Get Help    What it looks like:     Depression is seriously interfering with your life.     You may experience these or other symptoms: You can t get out of bed most days, can t work or engage in other necessary activities, you have trouble taking care of basic hygiene, or basic responsibilities, thoughts of suicide or death that will not go away, self-injurious behavior.     What you need to do:  1. Call your care  team and request a same-day appointment. If they are not available (weekends or after hours) call your local crisis line, emergency room or 911.      Electronically signed by: Cherrie Douglas, September 11, 2017    Depression Self Care Plan / Survival Kit    Self-Care for Depression  Here s the deal. Your body and mind are really not as separate as most people think.  What you do and think affects how you feel and how you feel influences what you do and think. This means if you do things that people who feel good do, it will help you feel better.  Sometimes this is all it takes.  There is also a place for medication and therapy depending on how severe your depression is, so be sure to consult with your medical provider and/ or Behavioral Health Consultant if your symptoms are worsening or not improving.     In order to better manage my stress, I will:    Exercise  Get some form of exercise, every day. This will help reduce pain and release endorphins, the  feel good  chemicals in your brain. This is almost as good as taking antidepressants!  This is not the same as joining a gym and then never going! (they count on that by the way ) It can be as simple as just going for a walk or doing some gardening, anything that will get you moving.      Hygiene   Maintain good hygiene (Get out of bed in the morning, Make your bed, Brush your teeth, Take a shower, and Get dressed like you were going to work, even if you are unemployed).  If your clothes don't fit try to get ones that do.    Diet  I will strive to eat foods that are good for me, drink plenty of water, and avoid excessive sugar, caffeine, alcohol, and other mood-altering substances.  Some foods that are helpful in depression are: complex carbohydrates, B vitamins, flaxseed, fish or fish oil, fresh fruits and vegetables.    Psychotherapy  I agree to participate in Individual Therapy (if recommended).    Medication  If prescribed medications, I agree to take them.   Missing doses can result in serious side effects.  I understand that drinking alcohol, or other illicit drug use, may cause potential side effects.  I will not stop my medication abruptly without first discussing it with my provider.    Staying Connected With Others  I will stay in touch with my friends, family members, and my primary care provider/team.    Use your imagination  Be creative.  We all have a creative side; it doesn t matter if it s oil painting, sand castles, or mud pies! This will also kick up the endorphins.    Witness Beauty  (AKA stop and smell the roses) Take a look outside, even in mid-winter. Notice colors, textures. Watch the squirrels and birds.     Service to others  Be of service to others.  There is always someone else in need.  By helping others we can  get out of ourselves  and remember the really important things.  This also provides opportunities for practicing all the other parts of the program.    Humor  Laugh and be silly!  Adjust your TV habits for less news and crime-drama and more comedy.    Control your stress  Try breathing deep, massage therapy, biofeedback, and meditation. Find time to relax each day.     My support system    Clinic Contact:  Phone number:    Contact 1:  Phone number:    Contact 2:  Phone number:    Evangelical/:  Phone number:    Therapist:  Phone number:    Local crisis center:    Phone number:    Other community support:  Phone number:

## 2017-09-11 NOTE — MR AVS SNAPSHOT
After Visit Summary   9/11/2017    Rafa Cochran    MRN: [id number]           Patient Information     Date Of Birth          1980        Visit Information        Provider Department      9/11/2017 11:40 AM Francoise An NP [facility]        Today's Diagnoses     Upper respiratory tract infection, unspecified type    -  1    Throat pain          Care Instructions    Call and let NP know how your feeling Thurs/Friday.           Follow-ups after your visit        Who to contact     If you have questions or need follow up information about today's clinic visit or your schedule please contact [facility] directly at [phone].  Normal or non-critical lab and imaging results will be communicated to you by Spare Backuphart, letter or phone within 4 business days after the clinic has received the results. If you do not hear from us within 7 days, please contact the clinic through Spare Backuphart or phone. If you have a critical or abnormal lab result, we will notify you by phone as soon as possible.  Submit refill requests through Intio or call your pharmacy and they will forward the refill request to us. Please allow 3 business days for your refill to be completed.          Additional Information About Your Visit        MyChart Information     Intio gives you secure access to your electronic health record. If you see a primary care provider, you can also send messages to your care team and make appointments. If you have questions, please call your primary care clinic.  If you do not have a primary care provider, please call [phone] and they will assist you.        Care EveryWhere ID     This is your Care EveryWhere ID. This could be used by other organizations to access your [facility] medical records  [id number]        Your Vitals Were     Pulse Temperature Respirations Pulse Oximetry BMI (Body Mass Index)       117 98.9  F (37.2  C) (Oral) 14 95% 33.81 kg/m2        Blood  Pressure from Last 3 Encounters:   09/11/17 120/70   08/30/17 120/70   01/26/17 130/82    Weight from Last 3 Encounters:   09/11/17 122.7 kg (270 lb 8 oz)   08/30/17 121.1 kg (267 lb)   01/26/17 121.5 kg (267 lb 14.4 oz)              We Performed the Following     Beta strep group A culture     Strep, Rapid Screen          Today's Medication Changes          These changes are accurate as of: 9/11/17 12:21 PM.  If you have any questions, ask your nurse or doctor.               Start taking these medicines.        Dose/Directions    amoxicillin-clavulanate 500-125 MG per tablet   Commonly known as:  AUGMENTIN   Used for:  Upper respiratory tract infection, unspecified type   Started by:  Francoise An NP        Dose:  1 tablet   Take 1 tablet by mouth 2 times daily for 7 days   Quantity:  14 tablet   Refills:  0            Where to get your medicines      These medications were sent to [email or web address] [address] [place] [address]  AT [facility] & [address] , [facility] [address]     Phone:  [phone]     amoxicillin-clavulanate 500-125 MG per tablet                Primary Care Provider Office Phone # Fax #    Linda Payne [phone] [address] [address]        Equal Access to Services     FREDY ALTAMIRANO AH: Hadjustine grijalva hadbarto Socharity, waaxda luqadaha, qaybta kaalmada jaime, elisabeth helms. So [facility] [phone].    ATENCIÓN: Si habla español, tiene a marin disposición servicios gratuitos de asistencia lingüística. Randa al [phone].    We comply with applicable federal civil rights laws and [place] laws. We do not discriminate on the basis of race, color, national origin, age, disability sex, sexual orientation or gender identity.            Thank you!     Thank you for choosing [facility]  for your care. Our goal is always to provide you with excellent care. Hearing  back from our patients is one way we can continue to improve our services. Please take a few minutes to complete the written survey that you may receive in the mail after your visit with us. Thank you!             Your Updated Medication List - Protect others around you: Learn how to safely use, store and throw away your medicines at www.disposemymeds.org.          This list is accurate as of: 9/11/17 12:21 PM.  Always use your most recent med list.                   Brand Name Dispense Instructions for use Diagnosis    amoxicillin-clavulanate 500-125 MG per tablet    AUGMENTIN    14 tablet    Take 1 tablet by mouth 2 times daily for 7 days    Upper respiratory tract infection, unspecified type       LORazepam 2 MG tablet    ATIVAN    30 tablet    Take  by mouth 4 times daily.        mirtazapine 30 MG tablet    REMERON     TK 1 T PO HS        order for DME      autoCPAP 8cm

## 2017-09-11 NOTE — LETTER
September 11, 2017                                                                     To Whom it May Concern:    Rafa Cochran attended clinic here on Sep 11, 2017 for URI.  He should only attend school this week if he is feeling better. He should be allowed to re-schedule an exam this week if he is not feeling well enough to take it.      Current Outpatient Prescriptions   Medication Sig Dispense Refill     amoxicillin-clavulanate (AUGMENTIN) 500-125 MG per tablet Take 1 tablet by mouth 2 times daily for 7 days 14 tablet 0                              Sincerely,        NAS Smith, NP-C

## 2017-09-11 NOTE — NURSING NOTE
"Chief Complaint   Patient presents with     URI       Initial /70 (BP Location: Right arm, Patient Position: Sitting, Cuff Size: Adult Large)  Pulse 117  Temp 98.9  F (37.2  C) (Oral)  Resp 14  Wt 122.7 kg (270 lb 8 oz)  SpO2 95%  BMI 33.81 kg/m2 Estimated body mass index is 33.81 kg/(m^2) as calculated from the following:    Height as of 8/30/17: 1.905 m (6' 3\").    Weight as of this encounter: 122.7 kg (270 lb 8 oz).  Medication Reconciliation: ivette Douglas        "

## 2017-09-12 LAB
BACTERIA SPEC CULT: NORMAL
SPECIMEN SOURCE: NORMAL

## 2017-12-15 ENCOUNTER — OFFICE VISIT (OUTPATIENT)
Dept: FAMILY MEDICINE | Facility: CLINIC | Age: 37
End: 2017-12-15
Payer: MEDICARE

## 2017-12-15 VITALS
WEIGHT: 275.2 LBS | OXYGEN SATURATION: 97 % | DIASTOLIC BLOOD PRESSURE: 83 MMHG | HEART RATE: 86 BPM | TEMPERATURE: 98.1 F | SYSTOLIC BLOOD PRESSURE: 134 MMHG | BODY MASS INDEX: 34.4 KG/M2

## 2017-12-15 DIAGNOSIS — H60.501 ACUTE OTITIS EXTERNA OF RIGHT EAR, UNSPECIFIED TYPE: ICD-10-CM

## 2017-12-15 DIAGNOSIS — H61.23 BILATERAL IMPACTED CERUMEN: Primary | ICD-10-CM

## 2017-12-15 PROCEDURE — 69209 REMOVE IMPACTED EAR WAX UNI: CPT | Mod: 50 | Performed by: PHYSICIAN ASSISTANT

## 2017-12-15 PROCEDURE — 99213 OFFICE O/P EST LOW 20 MIN: CPT | Mod: 25 | Performed by: PHYSICIAN ASSISTANT

## 2017-12-15 RX ORDER — NEOMYCIN SULFATE, POLYMYXIN B SULFATE AND HYDROCORTISONE 10; 3.5; 1 MG/ML; MG/ML; [USP'U]/ML
4 SUSPENSION/ DROPS AURICULAR (OTIC) 3 TIMES DAILY
Qty: 3 ML | Refills: 0 | Status: SHIPPED | OUTPATIENT
Start: 2017-12-15 | End: 2017-12-20

## 2017-12-15 NOTE — PROGRESS NOTES
SUBJECTIVE:   Rafa Cochran is a 37 year old male who presents to clinic today for the following health issues:  ENT Symptoms             Symptoms: cc Present Absent Comment   Fever/Chills   x    Fatigue   x    Muscle Aches   x    Eye Irritation   x    Sneezing   x    Nasal Toni/Drg  x     Sinus Pressure/Pain  x  Last couple weeks   Loss of smell       Dental pain  x  Last couple weeks   Sore Throat   x Throat discomfort, no soreness   Swollen Glands   x    Ear Pain/Fullness  x  Bilateral Pressure, tingling, crust in ears, a lot of ear wax   Cough  x     Wheeze   x    Chest Pain  x  Could be related to getting through stress of college   Shortness of breath  x  Could be related to getting through stress of college   Rash   x    Other   x      Symptom duration:  3 days ago   Symptom severity:  mild   Treatments tried:  none   Contacts:  Dog has ear infection     Problem list and histories reviewed & adjusted, as indicated.  Additional history: as documented    Patient Active Problem List   Diagnosis     Major depression in partial remission (H)     Bipolar affective disorder (H)     OCD (obsessive compulsive disorder)     Schizoaffective disorder (H)     Sinus tachycardia     Palpitations     Hyperlipidemia LDL goal <160     Hypertriglyceridemia     Prediabetes     Panic attack     BABATUNDE (obstructive sleep apnea)- moderate (AHI 17)     Pain in joint of right shoulder     Sprain and strain of shoulder and upper arm, left, initial encounter     Shoulder pain, left     Past Surgical History:   Procedure Laterality Date     APPENDECTOMY         Social History   Substance Use Topics     Smoking status: Former Smoker     Quit date: 12/25/2013     Smokeless tobacco: Never Used      Comment: 5 cigs per day     Alcohol use 0.0 oz/week     0 Standard drinks or equivalent per week      Comment: rare use     Family History   Problem Relation Age of Onset     C.A.D. Maternal Grandfather      DIABETES Maternal Grandfather       Hypertension Maternal Grandfather      HEART DISEASE Maternal Grandfather      C.A.D. Paternal Grandmother      DIABETES Paternal Grandmother      Circulatory Paternal Grandmother      GASTROINTESTINAL DISEASE Paternal Grandmother      C.A.D. Paternal Grandfather      Hypertension Paternal Grandfather      Circulatory Paternal Grandfather      HEART DISEASE Paternal Grandfather      GASTROINTESTINAL DISEASE Paternal Grandfather      Hypertension Father      Alcohol/Drug Father      Allergies Father      Depression Father      GASTROINTESTINAL DISEASE Father      Prostate Cancer Father      CEREBROVASCULAR DISEASE Maternal Grandmother      Arthritis Maternal Grandmother      HEART DISEASE Maternal Grandmother      Alcohol/Drug Mother      Allergies Mother      Circulatory Mother      Depression Mother      HEART DISEASE Mother      DIABETES Mother      Alcohol/Drug Sister      Allergies Sister      Depression Sister      Genitourinary Problems Sister          Current Outpatient Prescriptions   Medication Sig Dispense Refill     neomycin-polymyxin-hydrocortisone (CORTISPORIN) 3.5-69265-0 otic suspension Place 4 drops into the right ear 3 times daily for 5 days 3 mL 0     mirtazapine (REMERON) 30 MG tablet TK 1 T PO HS  3     ORDER FOR DME autoCPAP 8cm       lorazepam (ATIVAN) 2 MG tablet Take  by mouth 4 times daily. 30 tablet 0     Allergies   Allergen Reactions     Nicotine Rash     Nicotine Patches     Pertussis Toxoid      Bad reaction as a child         ROS:  Constitutional, HEENT, cardiovascular, pulmonary, gi and gu systems are negative, except as otherwise noted.      OBJECTIVE:   /83 (BP Location: Right arm, Patient Position: Chair, Cuff Size: Adult Large)  Pulse 86  Temp 98.1  F (36.7  C) (Oral)  Wt 275 lb 3.2 oz (124.8 kg)  SpO2 97%  BMI 34.4 kg/m2  Body mass index is 34.4 kg/(m^2).  GENERAL: healthy, alert and no distress  HENT: normal cephalic/atraumatic, right ear: occluded with wax and red  and boggy canal, left ear: normal: no effusions, no erythema, normal landmarks and occluded with wax, nose and mouth without ulcers or lesions, oropharynx clear and oral mucous membranes moist    Diagnostic Test Results:  none     ASSESSMENT/PLAN:       ICD-10-CM    1. Bilateral impacted cerumen H61.23    2. Acute otitis externa of right ear, unspecified type H60.501 neomycin-polymyxin-hydrocortisone (CORTISPORIN) 3.5-03775-2 otic suspension     1.Ear wax was removed with ear lavage and curetting   Patient tolerated it well.  2. Cortisporin ear drops apply 4 drops three times a day to the right ear for 5 days       Marina Khanna PA-C  [facility]

## 2017-12-15 NOTE — PATIENT INSTRUCTIONS
Cortisporin ear drops apply 4 drops three times a day to the right ear for 5 days   Earwax Removal    The ear canal makes earwax from the canal s lining. The ears make wax to lubricate and protect the ear canal. The ear canal is the tube that connects the middle ear to the outside of the ear. The wax protects the ear from bacteria, infection, and damage from water or trauma.  The wax that forms in the canal naturally moves toward the outside of the ear and falls out. In some cases, the ear may make too much wax. If the wax causes problems or keeps the healthcare provider from seeing into the ear, the extra wax may be removed.  Too much wax can affect your hearing. It can cause itching. In rare cases, it can be painful. Earwax should not be removed unless it is causing a problem. You should not stick objects into your ear to remove wax unless told to do so by your healthcare provider.  Healthcare providers can remove earwax safely. It is important to stay still during the procedure to avoid damage to the ear canal. But removing earwax generally doesn t hurt. You will not usually need anesthesia or pain medicine when the provider removes the earwax.  A number of conditions lead to earwax buildup. These include some skin problems, a narrow ear canal, or ears that make too much earwax. Using cotton swabs in the canal pushes earwax deeper into the ear and contributes to the buildup of earwax.  Home care    The healthcare provider may recommend mineral oil or an over-the-counter eardrop to use at home to soften the earwax. Use these products only if the provider recommends them. Carefully follow the instructions given.    Don t use mineral oil or OTC eardrops if you might have an ear infection or a ruptured eardrum. Tell your healthcare provider right away if you have diabetes or an immune disorder.    Don t use cotton swabs in your ears. Cotton swabs may push wax deeper into the ear canal or damage the eardrum. Use  cotton gauze or a wet washcloth  to gently remove wax on the outside of the ear and around the opening to the ear canal.    Don't use any probing device or object such as cotton-tipped swabs or joselito pins to clean the inside of your ears.    Don t use ear candles to clean your ears. Candling can be dangerous. It can burn the ear canal. It can also make the condition worse instead of better.    Don t use cold water to rinse the ear. This will make you dizzy. If your provider tells you to rinse your ear, use only warm water or follow his or her instructions.    Check the ear for signs of infection or irritation listed below under When to seek medical advice.  Steps for using eardrops  1. Warm the medicine bottle by rubbing it between your hands for a few minutes.  2. Lie down on your side, with the affected ear up.  3. Place the recommended number of drops in the ear. Wet a cotton ball with the medicine. Gently put the cotton ball into the ear opening.  Follow-up care  Follow up with your healthcare provider, or as directed.  When to seek medical advice  Call the provider right away if you have:    Ear pain that gets worse    Fever of 100.4F F (38 C) or higher, or as directed by your healthcare provider    Worsening wax buildup    Severe pain, dizziness, or nausea    Bleeding from the ear    Hearing problems    Signs of irritation from the eardrops, such as burning, stinging, or swelling and tenderness    Foul-smelling fluid draining from the ear    Swelling, redness, or tenderness of the outer ear    Headache, neck pain, or stiff neck  Date Last Reviewed: 6/1/2017 2000-2017 [employer]. [address] [place] [address], [place] [address]. All rights reserved. This information is not intended as a substitute for professional medical care. Always follow your healthcare professional's instructions.        External Ear Infection (Adult)    External otitis (also called  swimmer s ear ) is an infection in the ear  canal. It is often caused by bacteria or fungus. It can occur a few days after water gets trapped in the ear canal (from swimming or bathing). It can also occur after cleaning too deeply in the ear canal with a cotton swab or other object. Sometimes, hair care products get into the ear canal and cause this problem.  Symptoms can include pain, fever, itching, redness, drainage, or swelling of the ear canal. Temporary hearing loss may also occur.  Home care    Do not try to clean the ear canal. This can push pus and bacteria deeper into the canal.    Use prescribed ear drops as directed. These help reduce swelling and fight the infection. If an ear wick was placed in the ear canal, apply drops right onto the end of the wick. The wick will draw the medication into the ear canal even if it is swollen closed.    A cotton ball may be loosely placed in the outer ear to absorb any drainage.    You may use acetaminophen or ibuprofen to control pain, unless another medication was prescribed. Note: If you have chronic liver or kidney disease or ever had a stomach ulcer or GI bleeding, talk to your health care provider before taking any of these medications.    Do not allow water to get into your ear when bathing. Also, avoid swimming until the infection has cleared.  Prevention    Keep your ears dry. This helps lower the risk of infection. Dry your ears with a towel or hair dryer after getting wet. Also, use ear plugs when swimming.    Do not stick any objects in the ear to remove wax.    If you feel water trapped in your ear, use ear drops right away. You can get these drops over the counter at most drugstores. They work by removing water from the ear canal.  Follow-up care  Follow up with your health care provider in one week, or as advised.  When to seek medical advice  Call your health care provider right away if any of these occur:    Ear pain becomes worse or doesn t improve after 3 days of treatment    Redness or  swelling of the outer ear occurs or gets worse    Headache    Painful or stiff neck    Drowsiness or confusion    Fever of 100.4 F (38 C) or higher, or as directed by your health care provider    Seizure  Date Last Reviewed: 3/22/2015    5591-4063 [employer]. [address] [place] [address]. All rights reserved. This information is not intended as a substitute for professional medical care. Always follow your healthcare professional's instructions.

## 2017-12-15 NOTE — MR AVS SNAPSHOT
After Visit Summary   12/15/2017    Rafa Cochran    MRN: [id number]           Patient Information     Date Of Birth          1980        Visit Information        Provider Department      12/15/2017 3:00 PM Marina Khanna PA-C [facility]        Today's Diagnoses     Need for prophylactic vaccination and inoculation against influenza    -  1    Bilateral impacted cerumen        Acute otitis externa of right ear, unspecified type          Care Instructions    Cortisporin ear drops apply 4 drops three times a day to the right ear for 5 days   Earwax Removal    The ear canal makes earwax from the canal s lining. The ears make wax to lubricate and protect the ear canal. The ear canal is the tube that connects the middle ear to the outside of the ear. The wax protects the ear from bacteria, infection, and damage from water or trauma.  The wax that forms in the canal naturally moves toward the outside of the ear and falls out. In some cases, the ear may make too much wax. If the wax causes problems or keeps the healthcare provider from seeing into the ear, the extra wax may be removed.  Too much wax can affect your hearing. It can cause itching. In rare cases, it can be painful. Earwax should not be removed unless it is causing a problem. You should not stick objects into your ear to remove wax unless told to do so by your healthcare provider.  Healthcare providers can remove earwax safely. It is important to stay still during the procedure to avoid damage to the ear canal. But removing earwax generally doesn t hurt. You will not usually need anesthesia or pain medicine when the provider removes the earwax.  A number of conditions lead to earwax buildup. These include some skin problems, a narrow ear canal, or ears that make too much earwax. Using cotton swabs in the canal pushes earwax deeper into the ear and contributes to the buildup of earwax.  Home care    The  healthcare provider may recommend mineral oil or an over-the-counter eardrop to use at home to soften the earwax. Use these products only if the provider recommends them. Carefully follow the instructions given.    Don t use mineral oil or OTC eardrops if you might have an ear infection or a ruptured eardrum. Tell your healthcare provider right away if you have diabetes or an immune disorder.    Don t use cotton swabs in your ears. Cotton swabs may push wax deeper into the ear canal or damage the eardrum. Use cotton gauze or a wet washcloth  to gently remove wax on the outside of the ear and around the opening to the ear canal.    Don't use any probing device or object such as cotton-tipped swabs or joselito pins to clean the inside of your ears.    Don t use ear candles to clean your ears. Candling can be dangerous. It can burn the ear canal. It can also make the condition worse instead of better.    Don t use cold water to rinse the ear. This will make you dizzy. If your provider tells you to rinse your ear, use only warm water or follow his or her instructions.    Check the ear for signs of infection or irritation listed below under When to seek medical advice.  Steps for using eardrops  1. Warm the medicine bottle by rubbing it between your hands for a few minutes.  2. Lie down on your side, with the affected ear up.  3. Place the recommended number of drops in the ear. Wet a cotton ball with the medicine. Gently put the cotton ball into the ear opening.  Follow-up care  Follow up with your healthcare provider, or as directed.  When to seek medical advice  Call the provider right away if you have:    Ear pain that gets worse    Fever of 100.4F F (38 C) or higher, or as directed by your healthcare provider    Worsening wax buildup    Severe pain, dizziness, or nausea    Bleeding from the ear    Hearing problems    Signs of irritation from the eardrops, such as burning, stinging, or swelling and  tenderness    Foul-smelling fluid draining from the ear    Swelling, redness, or tenderness of the outer ear    Headache, neck pain, or stiff neck  Date Last Reviewed: 6/1/2017 2000-2017 [employer]. [address] [place] [address], [place] [address]. All rights reserved. This information is not intended as a substitute for professional medical care. Always follow your healthcare professional's instructions.        External Ear Infection (Adult)    External otitis (also called  swimmer s ear ) is an infection in the ear canal. It is often caused by bacteria or fungus. It can occur a few days after water gets trapped in the ear canal (from swimming or bathing). It can also occur after cleaning too deeply in the ear canal with a cotton swab or other object. Sometimes, hair care products get into the ear canal and cause this problem.  Symptoms can include pain, fever, itching, redness, drainage, or swelling of the ear canal. Temporary hearing loss may also occur.  Home care    Do not try to clean the ear canal. This can push pus and bacteria deeper into the canal.    Use prescribed ear drops as directed. These help reduce swelling and fight the infection. If an ear wick was placed in the ear canal, apply drops right onto the end of the wick. The wick will draw the medication into the ear canal even if it is swollen closed.    A cotton ball may be loosely placed in the outer ear to absorb any drainage.    You may use acetaminophen or ibuprofen to control pain, unless another medication was prescribed. Note: If you have chronic liver or kidney disease or ever had a stomach ulcer or GI bleeding, talk to your health care provider before taking any of these medications.    Do not allow water to get into your ear when bathing. Also, avoid swimming until the infection has cleared.  Prevention    Keep your ears dry. This helps lower the risk of infection. Dry your ears with a towel or hair dryer after getting wet.  Also, use ear plugs when swimming.    Do not stick any objects in the ear to remove wax.    If you feel water trapped in your ear, use ear drops right away. You can get these drops over the counter at most drugstores. They work by removing water from the ear canal.  Follow-up care  Follow up with your health care provider in one week, or as advised.  When to seek medical advice  Call your health care provider right away if any of these occur:    Ear pain becomes worse or doesn t improve after 3 days of treatment    Redness or swelling of the outer ear occurs or gets worse    Headache    Painful or stiff neck    Drowsiness or confusion    Fever of 100.4 F (38 C) or higher, or as directed by your health care provider    Seizure  Date Last Reviewed: 3/22/2015    3460-7591 [employer]. [address] [place] [address]. All rights reserved. This information is not intended as a substitute for professional medical care. Always follow your healthcare professional's instructions.                Follow-ups after your visit        Who to contact     If you have questions or need follow up information about today's clinic visit or your schedule please contact [facility] directly at [phone].  Normal or non-critical lab and imaging results will be communicated to you by MyChart, letter or phone within 4 business days after the clinic has received the results. If you do not hear from us within 7 days, please contact the clinic through [employer] or phone. If you have a critical or abnormal lab result, we will notify you by phone as soon as possible.  Submit refill requests through [employer] or call your pharmacy and they will forward the refill request to us. Please allow 3 business days for your refill to be completed.          Additional Information About Your Visit        [employer] Information     [employer] gives you secure access to your electronic health record. If you see a primary  care provider, you can also send messages to your care team and make appointments. If you have questions, please call your primary care clinic.  If you do not have a primary care provider, please call [phone] and they will assist you.        Care EveryWhere ID     This is your Care EveryWhere ID. This could be used by other organizations to access your [facility] medical records  [id number]        Your Vitals Were     Pulse Temperature Pulse Oximetry BMI (Body Mass Index)          86 98.1  F (36.7  C) (Oral) 97% 34.4 kg/m2         Blood Pressure from Last 3 Encounters:   12/15/17 134/83   09/11/17 120/70   08/30/17 120/70    Weight from Last 3 Encounters:   12/15/17 275 lb 3.2 oz (124.8 kg)   09/11/17 270 lb 8 oz (122.7 kg)   08/30/17 267 lb (121.1 kg)              Today, you had the following     No orders found for display         Today's Medication Changes          These changes are accurate as of: 12/15/17  3:57 PM.  If you have any questions, ask your nurse or doctor.               Start taking these medicines.        Dose/Directions    neomycin-polymyxin-hydrocortisone 3.5-62717-0 otic suspension   Commonly known as:  CORTISPORIN   Used for:  Acute otitis externa of right ear, unspecified type   Started by:  Marina Khanna PA-C        Dose:  4 drop   Place 4 drops into the right ear 3 times daily for 5 days   Quantity:  3 mL   Refills:  0            Where to get your medicines      These medications were sent to [facility] [address] [place] [address] [address] AT [facility] & [place] [address] [address], [facility] [address]     Phone:  [phone]     neomycin-polymyxin-hydrocortisone 3.5-88049-2 otic suspension                Primary Care Provider Office Phone # Fax #    Linda Payne [phone] [address] [address]        Equal Access to Services     FREDY ALTAMIRANO AH: gela Mcrae  amelia leighjennifer eltonelisabeth wilson shandramelly helms. So [facility] [phone].    ATENCIÓN: Si loretta turner, tiene a marin disposición servicios gratuitos de asistencia lingüística. Randa al [phone].    We comply with applicable federal civil rights laws and [place] laws. We do not discriminate on the basis of race, color, national origin, age, disability, sex, sexual orientation, or gender identity.            Thank you!     Thank you for choosing [facility]  for your care. Our goal is always to provide you with excellent care. Hearing back from our patients is one way we can continue to improve our services. Please take a few minutes to complete the written survey that you may receive in the mail after your visit with us. Thank you!             Your Updated Medication List - Protect others around you: Learn how to safely use, store and throw away your medicines at www.disposemymeds.org.          This list is accurate as of: 12/15/17  3:57 PM.  Always use your most recent med list.                   Brand Name Dispense Instructions for use Diagnosis    LORazepam 2 MG tablet    ATIVAN    30 tablet    Take  by mouth 4 times daily.        mirtazapine 30 MG tablet    REMERON     TK 1 T PO HS        neomycin-polymyxin-hydrocortisone 3.5-18893-0 otic suspension    CORTISPORIN    3 mL    Place 4 drops into the right ear 3 times daily for 5 days    Acute otitis externa of right ear, unspecified type       order for DME      autoCPAP 8cm

## 2017-12-18 ENCOUNTER — TELEPHONE (OUTPATIENT)
Dept: FAMILY MEDICINE | Facility: CLINIC | Age: 37
End: 2017-12-18

## 2017-12-18 NOTE — TELEPHONE ENCOUNTER
Reason for Call:  Medication question     Detailed comments: Patient has some questions regarding neomycin-polymyxin-hydrocortisone (CORTISPORIN) 3.5-77800-6 otic suspension Please call to further discuss.     Phone Number Patient can be reached at: Cell number on file:    Telephone Information:   Mobile [phone]       Best Time: anytime     Can we leave a detailed message on this number? YES    Call taken on 12/18/2017 at 2:03 PM by Carrillo Arthur

## 2017-12-19 NOTE — TELEPHONE ENCOUNTER
Answered patient's questions regarding ear drop administration. No further questions at this time.    Claudia López RN   [facility]

## 2018-01-03 ENCOUNTER — TELEPHONE (OUTPATIENT)
Dept: FAMILY MEDICINE | Facility: CLINIC | Age: 38
End: 2018-01-03

## 2018-01-03 NOTE — TELEPHONE ENCOUNTER
"Rafa Cochran is a 37 year old male who calls with \"feels like hard time to take a deep breath\", \"random chest pains all over\", and intermittent right arm pain.    NURSING ASSESSMENT:  Description:  Patient stating he has been feeling like he can't take a deep breath lately, has been an issue for about 9 months, per pt. Pt also noting having intermittent chest pains \"all over\" and right arm pain, below armpit area. Pt does not feel any of these symptoms are related to exersion and seem to be continuing on for the last several months and occurring more frequently. Pt also notes he get occasional flashes of light in the corners of his eyes and notes he does suffer from panic disorder and panic attacks and reports having a \"major\" panic attack earlier in the day. When panic attack occurred, pt noted feeling dizzy and lightheaded and \"pounding\" heart beat. Pt reports taking his psych meds and a \"tranquilizer\" to help him calm down and was feeling better with these symptoms by time of call.  Onset/duration:  Several months, pt unsure but thinks about 9-10 months  Precip. factors:  Panic disorder, panic attacks, agorophobia, and 40 lb weight gain in the last 6 months. Pt also stating he has GERD and has hx of ulcerative esophagitis and admits does not take any medication for this and notes he has had an increase in symptoms of severe heartburn, feeling of lump in throat and stomach discomfort. Pt notes that he eats poorly, typically having take out meals or microwave meals due to schedule of work and being in school. Has been to ER 2 x in last 3 months for panic attack symptoms and complaints  Associated symptoms:  Feels hard to take deep breath, random chest pains in lower rib area, near diaphragm, throbbing like pain in right arm underneath armpit area  Improves/worsens symptoms:  Anxiety meds and \"tranquilizer\" meds seem to help (medications prescribed by pt therapist)  Last exam/Treatment:  12/15/17. But pt does have " appointment scheduled for tomorrow with Dr. Albert Carrero to discuss ongoing problems and concerns.  Allergies:   Allergies   Allergen Reactions     Nicotine Rash     Nicotine Patches     Pertussis Toxoid      Bad reaction as a child       MEDICATIONS:   Taking medication(s) as prescribed? Yes        NURSING PLAN: Nursing advice to patient : keep scheduled appointment for tomorrow with Albert Carrero to discuss issues and concerns. No need per judgement and RN protocol for urgent appointment with UC or ER at this time.    RECOMMENDED DISPOSITION:   See provider to address concerns of panic attacks, weight gain, severe GERD symptoms and ongoing intermittent chest pain and right arm pain. Pt has appointment scheduled for tomorrow at  clinic with Dr. Albert Carrero for symptoms. Writer advised pt to keep this appointment and to seek care at UC or ER if feels symptoms worsen, increase or unable to control panic attack if occurs again. Patient reassured that symptoms don't seem to be emergent but agree that need to be addressed by provider in near future.     Will comply with recommendation: Yes       If further questions/concerns or if symptoms do not improve, worsen or new symptoms develop, call your PCP or [facility] as soon as possible.      Guideline used:  Telephone Triage Protocols for Nurses, Fifth Edition, Karen Ricci RN

## 2018-01-04 ENCOUNTER — OFFICE VISIT (OUTPATIENT)
Dept: FAMILY MEDICINE | Facility: CLINIC | Age: 38
End: 2018-01-04
Payer: MEDICARE

## 2018-01-04 VITALS
SYSTOLIC BLOOD PRESSURE: 128 MMHG | HEIGHT: 75 IN | RESPIRATION RATE: 16 BRPM | WEIGHT: 286.8 LBS | TEMPERATURE: 98.4 F | HEART RATE: 109 BPM | BODY MASS INDEX: 35.66 KG/M2 | DIASTOLIC BLOOD PRESSURE: 78 MMHG | OXYGEN SATURATION: 96 %

## 2018-01-04 DIAGNOSIS — Z13.220 SCREENING CHOLESTEROL LEVEL: ICD-10-CM

## 2018-01-04 DIAGNOSIS — R63.5 WEIGHT GAIN: Primary | ICD-10-CM

## 2018-01-04 DIAGNOSIS — E66.01 MORBID OBESITY (H): ICD-10-CM

## 2018-01-04 DIAGNOSIS — K21.9 GASTROESOPHAGEAL REFLUX DISEASE, ESOPHAGITIS PRESENCE NOT SPECIFIED: ICD-10-CM

## 2018-01-04 DIAGNOSIS — R73.9 HYPERGLYCEMIA: ICD-10-CM

## 2018-01-04 DIAGNOSIS — R07.89 CHEST TIGHTNESS: ICD-10-CM

## 2018-01-04 LAB
ALBUMIN SERPL-MCNC: 4.2 G/DL (ref 3.4–5)
ALP SERPL-CCNC: 78 U/L (ref 40–150)
ALT SERPL W P-5'-P-CCNC: 41 U/L (ref 0–70)
ANION GAP SERPL CALCULATED.3IONS-SCNC: 6 MMOL/L (ref 3–14)
AST SERPL W P-5'-P-CCNC: 23 U/L (ref 0–45)
BILIRUB SERPL-MCNC: 0.2 MG/DL (ref 0.2–1.3)
BUN SERPL-MCNC: 17 MG/DL (ref 7–30)
CALCIUM SERPL-MCNC: 9.3 MG/DL (ref 8.5–10.1)
CHLORIDE SERPL-SCNC: 106 MMOL/L (ref 94–109)
CHOLEST SERPL-MCNC: 289 MG/DL
CO2 SERPL-SCNC: 28 MMOL/L (ref 20–32)
CREAT SERPL-MCNC: 1.01 MG/DL (ref 0.66–1.25)
ERYTHROCYTE [DISTWIDTH] IN BLOOD BY AUTOMATED COUNT: 13.2 % (ref 10–15)
GFR SERPL CREATININE-BSD FRML MDRD: 84 ML/MIN/1.7M2
GLUCOSE SERPL-MCNC: 130 MG/DL (ref 70–99)
HCT VFR BLD AUTO: 42.6 % (ref 40–53)
HDLC SERPL-MCNC: 49 MG/DL
HGB BLD-MCNC: 14.7 G/DL (ref 13.3–17.7)
LDLC SERPL CALC-MCNC: 189 MG/DL
MCH RBC QN AUTO: 29.7 PG (ref 26.5–33)
MCHC RBC AUTO-ENTMCNC: 34.5 G/DL (ref 31.5–36.5)
MCV RBC AUTO: 86 FL (ref 78–100)
NONHDLC SERPL-MCNC: 240 MG/DL
PLATELET # BLD AUTO: 293 10E9/L (ref 150–450)
POTASSIUM SERPL-SCNC: 4.9 MMOL/L (ref 3.4–5.3)
PROT SERPL-MCNC: 7.9 G/DL (ref 6.8–8.8)
RBC # BLD AUTO: 4.95 10E12/L (ref 4.4–5.9)
SODIUM SERPL-SCNC: 140 MMOL/L (ref 133–144)
TRIGL SERPL-MCNC: 254 MG/DL
TSH SERPL DL<=0.005 MIU/L-ACNC: 0.94 MU/L (ref 0.4–4)
WBC # BLD AUTO: 7.3 10E9/L (ref 4–11)

## 2018-01-04 PROCEDURE — 85027 COMPLETE CBC AUTOMATED: CPT | Performed by: FAMILY MEDICINE

## 2018-01-04 PROCEDURE — 80061 LIPID PANEL: CPT | Performed by: FAMILY MEDICINE

## 2018-01-04 PROCEDURE — 93000 ELECTROCARDIOGRAM COMPLETE: CPT | Performed by: FAMILY MEDICINE

## 2018-01-04 PROCEDURE — 80053 COMPREHEN METABOLIC PANEL: CPT | Performed by: FAMILY MEDICINE

## 2018-01-04 PROCEDURE — 99214 OFFICE O/P EST MOD 30 MIN: CPT | Performed by: FAMILY MEDICINE

## 2018-01-04 PROCEDURE — 84443 ASSAY THYROID STIM HORMONE: CPT | Performed by: FAMILY MEDICINE

## 2018-01-04 PROCEDURE — 36415 COLL VENOUS BLD VENIPUNCTURE: CPT | Performed by: FAMILY MEDICINE

## 2018-01-04 RX ORDER — OMEPRAZOLE 40 MG/1
40 CAPSULE, DELAYED RELEASE ORAL DAILY
Qty: 30 CAPSULE | Refills: 1 | Status: SHIPPED | OUTPATIENT
Start: 2018-01-04 | End: 2018-01-04

## 2018-01-04 ASSESSMENT — PAIN SCALES - GENERAL: PAINLEVEL: NO PAIN (0)

## 2018-01-04 NOTE — MR AVS SNAPSHOT
After Visit Summary   1/4/2018    Rafa Cochran    MRN: [id number]           Patient Information     Date Of Birth          1980        Visit Information        Provider Department      1/4/2018 2:40 PM Rosanne Adrian MD [facility]        Today's Diagnoses     Weight gain    -  1    Chest tightness        Morbid obesity due to GERD (H)        Gastroesophageal reflux disease, esophagitis presence not specified        Screening cholesterol level          Care Instructions    At [facility], we strive to deliver an exceptional experience to you, every time we see you.  If you receive a survey in the mail, please send us back your thoughts. We really do value your feedback.    Based on your medical history, these are the current health maintenance/preventive care services that you are due for (some may have been done at this visit.)  Health Maintenance Due   Topic Date Due     MEDICARE ANNUAL WELLNESS VISIT  02/01/2000     LIPID MONITORING Q1 YEAR  10/28/2015     INFLUENZA VACCINE (SYSTEM ASSIGNED)  09/01/2017         Suggested websites for health information:  Www.Talentology : Up to date and easily searchable information on multiple topics.  Www.medlineplus.gov : medication info, interactive tutorials, watch real surgeries online  Www.familydoctor.org : good info from the Academy of Family Physicians  Www.cdc.gov : public health info, travel advisories, epidemics (H1N1)  Www.aap.org : children's health info, normal development, vaccinations  Www.health.state[place] : [place] dept of health, public health issues in [place], N1N1    Your care team:     Family Medicine   ANH Ruby MD Emily Bunt, NAS [facility]   S. MD Brandy Nguyen MD Angela Wermerskirchen, MD         Clinic hours: Monday - Wednesday 7 am-7 pm   Thursdays and Fridays 7 am-5 pm.     [facility]: Monday - Friday 11 am-9 pm,   " Saturday and Sunday 9 am-5 pm.    [facility]: Monday -Thursday 8 am-8 pm; Friday 8 am-6 pm; Saturday and Sunday 9 am-5 pm.     [facility]: Monday - Thursday 8 am - 7 pm; Friday 8 am - 6 pm    Clinic: [phone]   [facility]: [phone]   [facility]: [phone]                 Follow-ups after your visit        Who to contact     If you have questions or need follow up information about today's clinic visit or your schedule please contact [facility] directly at [phone].  Normal or non-critical lab and imaging results will be communicated to you by Edutorhart, letter or phone within 4 business days after the clinic has received the results. If you do not hear from us within 7 days, please contact the clinic through Edutorhart or phone. If you have a critical or abnormal lab result, we will notify you by phone as soon as possible.  Submit refill requests through Pinkdingo or call your pharmacy and they will forward the refill request to us. Please allow 3 business days for your refill to be completed.          Additional Information About Your Visit        Pinkdingo Information     Pinkdingo gives you secure access to your electronic health record. If you see a primary care provider, you can also send messages to your care team and make appointments. If you have questions, please call your primary care clinic.  If you do not have a primary care provider, please call [phone] and they will assist you.        Care EveryWhere ID     This is your Care EveryWhere ID. This could be used by other organizations to access your [facility] medical records  [id number]        Your Vitals Were     Pulse Temperature Respirations Height Pulse Oximetry BMI (Body Mass Index)    109 98.4  F (36.9  C) (Oral) 16 1.905 m (6' 3\") 96% 35.85 kg/m2       Blood Pressure from Last 3 Encounters:   01/04/18 128/78   12/15/17 134/83   09/11/17 120/70    Weight " from Last 3 Encounters:   01/04/18 130.1 kg (286 lb 12.8 oz)   12/15/17 124.8 kg (275 lb 3.2 oz)   09/11/17 122.7 kg (270 lb 8 oz)              We Performed the Following     CBC with platelets     Comprehensive metabolic panel     EKG 12-lead complete w/read - Clinics     Lipid panel reflex to direct LDL Fasting     TSH with free T4 reflex          Today's Medication Changes          These changes are accurate as of: 1/4/18  4:13 PM.  If you have any questions, ask your nurse or doctor.               Start taking these medicines.        Dose/Directions    omeprazole 40 MG capsule   Commonly known as:  priLOSEC   Used for:  Gastroesophageal reflux disease, esophagitis presence not specified   Started by:  Rosanne Adrian MD        Dose:  40 mg   Take 1 capsule (40 mg) by mouth daily Take 30-60 minutes before a meal.   Quantity:  30 capsule   Refills:  1            Where to get your medicines      These medications were sent to [facility] [address] [place] [address] [address] AT [facility] & [address] , [facility] [address]     Phone:  [phone]     omeprazole 40 MG capsule                Primary Care Provider Office Phone # Fax #    [facility] Payne [phone] [address] [address]        Equal Access to Services     FREDY ALTAMIRANO AH: Hadii alonso grijalva hadasho Sobishnuali, waaxda luqadaha, qaybta kaalmada adesteve, elisabeth helms. So [facility] [phone].    ATENCIÓN: Si habla español, tiene a marin disposición servicios gratuitos de asistencia lingüística. Randa al [phone].    We comply with applicable federal civil rights laws and [place] laws. We do not discriminate on the basis of race, color, national origin, age, disability, sex, sexual orientation, or gender identity.            Thank you!     Thank you for choosing [facility]  for your care. Our goal is always to  provide you with excellent care. Hearing back from our patients is one way we can continue to improve our services. Please take a few minutes to complete the written survey that you may receive in the mail after your visit with us. Thank you!             Your Updated Medication List - Protect others around you: Learn how to safely use, store and throw away your medicines at www.disposemymeds.org.          This list is accurate as of: 1/4/18  4:13 PM.  Always use your most recent med list.                   Brand Name Dispense Instructions for use Diagnosis    LORazepam 2 MG tablet    ATIVAN    30 tablet    Take  by mouth 4 times daily.        mirtazapine 30 MG tablet    REMERON     TK 1 T PO HS        omeprazole 40 MG capsule    priLOSEC    30 capsule    Take 1 capsule (40 mg) by mouth daily Take 30-60 minutes before a meal.    Gastroesophageal reflux disease, esophagitis presence not specified       order for DME      autoCPAP 8cm

## 2018-01-04 NOTE — PATIENT INSTRUCTIONS
At [facility], we strive to deliver an exceptional experience to you, every time we see you.  If you receive a survey in the mail, please send us back your thoughts. We really do value your feedback.    Based on your medical history, these are the current health maintenance/preventive care services that you are due for (some may have been done at this visit.)  Health Maintenance Due   Topic Date Due     MEDICARE ANNUAL WELLNESS VISIT  02/01/2000     LIPID MONITORING Q1 YEAR  10/28/2015     INFLUENZA VACCINE (SYSTEM ASSIGNED)  09/01/2017         Suggested websites for health information:  Www.SocialSmack.org : Up to date and easily searchable information on multiple topics.  Www.i2i Logic.gov : medication info, interactive tutorials, watch real surgeries online  Www.familydoctor.org : good info from the Academy of Family Physicians  Www.cdc.gov : public health info, travel advisories, epidemics (H1N1)  Www.aap.org : children's health info, normal development, vaccinations  Www.health.[facility].[place].us : [place] dept of health, public health issues in [place], N1N1    Your care team:     Family Medicine   ANH Ruby MD Emily Bunt, NAS CNP   S. MD Brandy Nguyen MD Angela Wermerskirchen, MD         Clinic hours: Monday - Wednesday 7 am-7 pm   Thursdays and Fridays 7 am-5 pm.     [facility]: Monday - Friday 11 am-9 pm,   Saturday and Sunday 9 am-5 pm.    [facility]: Monday -Thursday 8 am-8 pm; Friday 8 am-6 pm; Saturday and Sunday 9 am-5 pm.     [facility]: Monday - Thursday 8 am - 7 pm; Friday 8 am - 6 pm    Clinic: [phone]   [facility]: [phone]   [facility]: [phone]

## 2018-01-04 NOTE — PROGRESS NOTES
SUBJECTIVE:   Rafa Cochran is a 37 year old male who presents to clinic today for the following health issues:       Heart problem  Pt having a hard time exercising  and doing general things  Pt experiencing flash in the corner of the eye  Pain in chest and arm   Pt feel like he's about to fall off  This is what's been happening to pt in the last few months, per pt        Amount of exercise or physical activity: None    Problems taking medications regularly: No    Medication side effects: none    Diet: regular (no restrictions)    Episodes of lungs feeling like they are being crushed with gold rings flashing in eyes for 9 - 12 months but getting worse. Has not been able to exercise since August with more stress during finals.  Has had weight gain and trouble getting around so having more panic attacks.  First panic attack at age 17. Per Epic, patient has gained 45 pounds over the last 1.5 years and 19 pounds over the last year. Patient states he was about 230 in August.  Took 4 mg of ativan that is prescribed by psychiatrist about 1 pm.    GERD - diagnosed years ago with esophagitis but has never taken medication due to fear.  Having more heart burn symptoms now and having to eat to control the acid. Denies black stools.    Problem list and histories reviewed & adjusted, as indicated.  Additional history: as documented    Patient Active Problem List   Diagnosis     Major depression in partial remission (H)     Bipolar affective disorder (H)     OCD (obsessive compulsive disorder)     Schizoaffective disorder (H)     Sinus tachycardia     Palpitations     Hyperlipidemia LDL goal <160     Hypertriglyceridemia     Prediabetes     Panic attack     BABATUNDE (obstructive sleep apnea)- moderate (AHI 17)     Pain in joint of right shoulder     Sprain and strain of shoulder and upper arm, left, initial encounter     Shoulder pain, left     Past Surgical History:   Procedure Laterality Date     APPENDECTOMY         Social  "History   Substance Use Topics     Smoking status: Former Smoker     Quit date: 12/25/2013     Smokeless tobacco: Never Used      Comment: 5 cigs per day     Alcohol use 0.0 oz/week     0 Standard drinks or equivalent per week      Comment: rare use     Family History   Problem Relation Age of Onset     C.A.D. Maternal Grandfather      DIABETES Maternal Grandfather      Hypertension Maternal Grandfather      HEART DISEASE Maternal Grandfather      C.A.D. Paternal Grandmother      DIABETES Paternal Grandmother      Circulatory Paternal Grandmother      GASTROINTESTINAL DISEASE Paternal Grandmother      C.A.D. Paternal Grandfather      Hypertension Paternal Grandfather      Circulatory Paternal Grandfather      HEART DISEASE Paternal Grandfather      GASTROINTESTINAL DISEASE Paternal Grandfather      Hypertension Father      Alcohol/Drug Father      Allergies Father      Depression Father      GASTROINTESTINAL DISEASE Father      Prostate Cancer Father      CEREBROVASCULAR DISEASE Maternal Grandmother      Arthritis Maternal Grandmother      HEART DISEASE Maternal Grandmother      Alcohol/Drug Mother      Allergies Mother      Circulatory Mother      Depression Mother      HEART DISEASE Mother      DIABETES Mother      Alcohol/Drug Sister      Allergies Sister      Depression Sister      Genitourinary Problems Sister              Reviewed and updated as needed this visit by clinical staffTobacco  Allergies  Meds  Problems  Med Hx  Surg Hx  Fam Hx  Soc Hx        Reviewed and updated as needed this visit by Provider  Allergies  Meds  Problems         ROS:  Constitutional, HEENT, cardiovascular, pulmonary, GI, , musculoskeletal, neuro, skin, endocrine and psych systems are negative, except as otherwise noted.      OBJECTIVE:   /78 (BP Location: Right arm, Patient Position: Sitting, Cuff Size: Adult Large)  Pulse 109  Temp 98.4  F (36.9  C) (Oral)  Resp 16  Ht 1.905 m (6' 3\")  Wt 130.1 kg (286 lb " 12.8 oz)  SpO2 96%  BMI 35.85 kg/m2  Body mass index is 35.85 kg/(m^2).  GENERAL: healthy, alert, no distress and obese  HENT: ear canals and TM's normal, nose and mouth without ulcers or lesions  NECK: no adenopathy, no asymmetry, masses, or scars and thyroid normal to palpation  RESP: lungs clear to auscultation - no rales, rhonchi or wheezes  CV: regular rate and rhythm, normal S1 S2, no S3 or S4, no murmur, click or rub, no peripheral edema and peripheral pulses strong  ABDOMEN: soft, nontender, no hepatosplenomegaly, no masses and bowel sounds normal  MS: no gross musculoskeletal defects noted, no edema  PSYCH: mentation appears normal and anxious    Diagnostic Test Results:  EKG: no acute changes or abnormalities seen    ASSESSMENT/PLAN:     1. Weight gain  Etiologies discussed - check labs for causes and increase exercise while decreasing carbs.  - CBC with platelets  - TSH with free T4 reflex  - Comprehensive metabolic panel    2. Chest tightness  Patient reassured - monitor  - EKG 12-lead complete w/read - Clinics    3. Morbid obesity due to GERD (H)  Low carb eating    4. Gastroesophageal reflux disease, esophagitis presence not specified  Trial of omeprazole and dietary changes discussed  - omeprazole (PRILOSEC) 40 MG capsule; Take 1 capsule (40 mg) by mouth daily Take 30-60 minutes before a meal.  Dispense: 30 capsule; Refill: 1    5. Screening cholesterol level  Screening.  - Lipid panel reflex to direct LDL Fasting    FUTURE APPOINTMENTS:       - Follow-up visit in 2 weeks if symptoms persist.  See counselor and psychiatrist.    Rosanne Edgar MD  [facility]

## 2018-01-04 NOTE — TELEPHONE ENCOUNTER
Your patient is requesting 90 supply for omeprazole (PRILOSEC) 40 MG capsule.          [facility] Faarax  [facility]

## 2018-01-04 NOTE — NURSING NOTE
"Chief Complaint   Patient presents with     Heart Problem       Initial /78 (BP Location: Right arm, Patient Position: Sitting, Cuff Size: Adult Large)  Pulse 109  Temp 98.4  F (36.9  C) (Oral)  Resp 16  Ht 1.905 m (6' 3\")  Wt 130.1 kg (286 lb 12.8 oz)  SpO2 96%  BMI 35.85 kg/m2 Estimated body mass index is 35.85 kg/(m^2) as calculated from the following:    Height as of this encounter: 1.905 m (6' 3\").    Weight as of this encounter: 130.1 kg (286 lb 12.8 oz).  Medication Reconciliation: complete     Genet Ramirez      "

## 2018-01-05 RX ORDER — OMEPRAZOLE 40 MG/1
CAPSULE, DELAYED RELEASE ORAL
Qty: 90 CAPSULE | Refills: 1 | Status: SHIPPED | OUTPATIENT
Start: 2018-01-05 | End: 2018-04-24

## 2018-01-05 NOTE — TELEPHONE ENCOUNTER
Medication was started yesterday at 1/4/18 OV    Gastroesophageal reflux disease, esophagitis presence not specified  Trial of omeprazole and dietary changes discussed  - omeprazole (PRILOSEC) 40 MG capsule; Take 1 capsule (40 mg) by mouth daily Take 30-60 minutes before a meal.  Dispense: 30 capsule; Refill: 1    Pharmacy wanting #[phone].    To provider to advise.   Thank you, CHIKA Faust RN

## 2018-01-08 PROBLEM — K21.9 GASTROESOPHAGEAL REFLUX DISEASE, ESOPHAGITIS PRESENCE NOT SPECIFIED: Status: ACTIVE | Noted: 2018-01-08

## 2018-01-08 PROBLEM — R73.9 HYPERGLYCEMIA: Status: ACTIVE | Noted: 2018-01-08

## 2018-01-08 PROBLEM — E66.01 MORBID OBESITY (H): Status: ACTIVE | Noted: 2018-01-08

## 2018-01-08 NOTE — PROGRESS NOTES
Mr. Cochran,    Your LDL (bad cholesterol)  was above goal.  Genetics, diet, weight and low exercise levels can contribute to this. Your HDL (good cholesterol) was normal.  This is good. Your triglycerides were above normal.  Poor diet, genetics and being overweight can contribute to this.  1000mg daily of omega-3 fatty acids may improve this. You need to recheck fasting labs yearly.  Your liver function tests are normal.  Your blood sugar was elevated. This can be an indication that you are predisposed to developing diabetes  Your TSH indicates that your thyroid function is currently in balance.  No additional testing is necessary for a year or unless you develop symptoms of over or underactive thyroid.  Your kidney function was normal.  Your blood count is normal.  There is no anemia or abnormalities suggesting infection or other problems.    Follow up for additional labs to check for diabetes.  Decrease your bread, rice, pasta, sugar sweetened anything, potatoes and corn intake to reduce your weight, risk of diabetes and improve your cholesterol.    Please contact the clinic if you have additional questions.  Thank you.    Sincerely,    Rosanne Edgar

## 2018-04-24 ENCOUNTER — TELEPHONE (OUTPATIENT)
Dept: FAMILY MEDICINE | Facility: CLINIC | Age: 38
End: 2018-04-24

## 2018-04-24 ENCOUNTER — OFFICE VISIT (OUTPATIENT)
Dept: FAMILY MEDICINE | Facility: CLINIC | Age: 38
End: 2018-04-24
Payer: MEDICARE

## 2018-04-24 VITALS
WEIGHT: 272.3 LBS | HEART RATE: 89 BPM | BODY MASS INDEX: 33.86 KG/M2 | SYSTOLIC BLOOD PRESSURE: 128 MMHG | TEMPERATURE: 98.1 F | OXYGEN SATURATION: 97 % | RESPIRATION RATE: 14 BRPM | HEIGHT: 75 IN | DIASTOLIC BLOOD PRESSURE: 64 MMHG

## 2018-04-24 DIAGNOSIS — Z82.49 FAMILY HISTORY OF CARDIAC ARRHYTHMIA: ICD-10-CM

## 2018-04-24 DIAGNOSIS — K21.9 GASTROESOPHAGEAL REFLUX DISEASE, ESOPHAGITIS PRESENCE NOT SPECIFIED: ICD-10-CM

## 2018-04-24 DIAGNOSIS — F41.0 PANIC ATTACK: Primary | ICD-10-CM

## 2018-04-24 DIAGNOSIS — R00.2 PALPITATIONS: ICD-10-CM

## 2018-04-24 PROCEDURE — 93000 ELECTROCARDIOGRAM COMPLETE: CPT | Performed by: PHYSICIAN ASSISTANT

## 2018-04-24 PROCEDURE — 99214 OFFICE O/P EST MOD 30 MIN: CPT | Performed by: PHYSICIAN ASSISTANT

## 2018-04-24 RX ORDER — HYDROXYZINE PAMOATE 50 MG/1
50 CAPSULE ORAL 3 TIMES DAILY PRN
Qty: 60 CAPSULE | Refills: 1 | Status: SHIPPED | OUTPATIENT
Start: 2018-04-24 | End: 2019-10-22

## 2018-04-24 ASSESSMENT — ANXIETY QUESTIONNAIRES
3. WORRYING TOO MUCH ABOUT DIFFERENT THINGS: NEARLY EVERY DAY
1. FEELING NERVOUS, ANXIOUS, OR ON EDGE: NEARLY EVERY DAY
7. FEELING AFRAID AS IF SOMETHING AWFUL MIGHT HAPPEN: NEARLY EVERY DAY
6. BECOMING EASILY ANNOYED OR IRRITABLE: NEARLY EVERY DAY
5. BEING SO RESTLESS THAT IT IS HARD TO SIT STILL: SEVERAL DAYS
2. NOT BEING ABLE TO STOP OR CONTROL WORRYING: NEARLY EVERY DAY
GAD7 TOTAL SCORE: 19

## 2018-04-24 ASSESSMENT — PAIN SCALES - GENERAL: PAINLEVEL: MILD PAIN (2)

## 2018-04-24 ASSESSMENT — PATIENT HEALTH QUESTIONNAIRE - PHQ9: 5. POOR APPETITE OR OVEREATING: NEARLY EVERY DAY

## 2018-04-24 NOTE — TELEPHONE ENCOUNTER
"Rafa Cochran is a 38 year old male who calls with symptoms.    NURSING ASSESSMENT:  Description:  States his heart \"dropped\" then it felt like it was pounding  Onset/duration:  today  Precip. factors:  Was sitting doing homework  Associated symptoms:  Felt like it was pounding for 20 seconds, felt like pure adrenaline. States his fingers and toes felt numb for a little bit. Patient notes issues with anxiety and panic.    Negative for SOB or LOC    Allergies:   Allergies   Allergen Reactions     Nicotine Rash     Nicotine Patches     Pertussis Toxoid      Bad reaction as a child     NURSING PLAN: Nursing advice to patient be seen in clinic for assessment as patient has no symptoms at this time.    RECOMMENDED DISPOSITION:   Will comply with recommendation: Yes , scheduled with in the hour in clinic.     If further questions/concerns or if symptoms do not improve, worsen or new symptoms develop, call your PCP or [facility] as soon as possible.      Guideline used:  Telephone Triage Protocols for Nurses, Fifth Edition, Karen Arndt RN    "

## 2018-04-24 NOTE — TELEPHONE ENCOUNTER
Sent today but wants changed to 90 day supply:    ranitidine (ZANTAC) 300 MG tablet 30 tablet 3 4/24/2018

## 2018-04-24 NOTE — MR AVS SNAPSHOT
After Visit Summary   4/24/2018    Rafa Cochran    MRN: [id number]           Patient Information     Date Of Birth          1980        Visit Information        Provider Department      4/24/2018 1:20 PM Marina Khanna PA-C [facility]        Today's Diagnoses     Intercostal pain    -  1    Panic attack        Family history of cardiac arrhythmia        Palpitations        Gastroesophageal reflux disease, esophagitis presence not specified           Follow-ups after your visit        Additional Services     CARDIOLOGY EVAL ADULT REFERRAL       Preferred location:  [facility] [phone]   [email or web address]    Please be aware that coverage of these services is subject to the terms and limitations of your health insurance plan.  Call member services at your health plan with any benefit or coverage questions.      Please bring the following to your appointment:  Any x-rays, CTs or MRIs which have been performed. Contact the facility where they were done to arrange for  prior to your scheduled appointment.    List of current medications  This referral request   Any documents/labs given to you for this referral                  Who to contact     If you have questions or need follow up information about today's clinic visit or your schedule please contact [facility] directly at [phone].  Normal or non-critical lab and imaging results will be communicated to you by MyChart, letter or phone within 4 business days after the clinic has received the results. If you do not hear from us within 7 days, please contact the clinic through MyChart or phone. If you have a critical or abnormal lab result, we will notify you by phone as soon as possible.  Submit refill requests through [employer] or call your pharmacy and they will forward the refill request  "to us. Please allow 3 business days for your refill to be completed.          Additional Information About Your Visit        [employer] Information     [employer] gives you secure access to your electronic health record. If you see a primary care provider, you can also send messages to your care team and make appointments. If you have questions, please call your primary care clinic.  If you do not have a primary care provider, please call [phone] and they will assist you.        Care EveryWhere ID     This is your Care EveryWhere ID. This could be used by other organizations to access your [place] medical records  [id number]        Your Vitals Were     Pulse Temperature Respirations Height Pulse Oximetry BMI (Body Mass Index)    89 98.1  F (36.7  C) (Oral) 14 1.905 m (6' 3\") 97% 34.04 kg/m2       Blood Pressure from Last 3 Encounters:   04/24/18 128/64   01/04/18 128/78   12/15/17 134/83    Weight from Last 3 Encounters:   04/24/18 123.5 kg (272 lb 4.8 oz)   01/04/18 130.1 kg (286 lb 12.8 oz)   12/15/17 124.8 kg (275 lb 3.2 oz)              We Performed the Following     CARDIOLOGY EVAL ADULT REFERRAL     EKG 12-lead complete w/read - Clinics          Today's Medication Changes          These changes are accurate as of 4/24/18  2:11 PM.  If you have any questions, ask your nurse or doctor.               Start taking these medicines.        Dose/Directions    hydrOXYzine 50 MG capsule   Commonly known as:  VISTARIL   Used for:  Panic attack   Started by:  Marina Khanna PA-C        Dose:  50 mg   Take 1 capsule (50 mg) by mouth 3 times daily as needed for itching   Quantity:  60 capsule   Refills:  1       ranitidine 300 MG tablet   Commonly known as:  ZANTAC   Used for:  Gastroesophageal reflux disease, esophagitis presence not specified   Started by:  Marina Khanna PA-C        Dose:  300 mg   Take 1 tablet (300 mg) by mouth At Bedtime   Quantity:  30 tablet   Refills:  3       "   Stop taking these medicines if you haven't already. Please contact your care team if you have questions.     omeprazole 40 MG capsule   Commonly known as:  priLOSEC   Stopped by:  Marina Khanna PA-C                Where to get your medicines      These medications were sent to [id number] - [place] [address] AT [facility] & [address] , [facility] [address]     Phone:  [phone]     hydrOXYzine 50 MG capsule    ranitidine 300 MG tablet                Primary Care Provider Office Phone # Fax #    ElginShanon Payne [phone] [address] [address]        Equal Access to Services     FREDY ALTAMIRANO : Hadii alonso grijalva hadasho Soomaali, waaxda luqadaha, qaybta kaalmada adeegyada, elisabeth deviin hayjos ross . So [facility] [phone].    ATENCIÓN: Si habla español, tiene a marin disposición servicios gratuitos de asistencia lingüística. [facility] [phone].    We comply with applicable federal civil rights laws and [place] laws. We do not discriminate on the basis of race, color, national origin, age, disability, sex, sexual orientation, or gender identity.            Thank you!     Thank you for choosing [facility]  for your care. Our goal is always to provide you with excellent care. Hearing back from our patients is one way we can continue to improve our services. Please take a few minutes to complete the written survey that you may receive in the mail after your visit with us. Thank you!             Your Updated Medication List - Protect others around you: Learn how to safely use, store and throw away your medicines at www.disposemymeds.org.          This list is accurate as of 4/24/18  2:11 PM.  Always use your most recent med list.                   Brand Name Dispense Instructions for use Diagnosis    hydrOXYzine 50 MG capsule    VISTARIL    60 capsule    Take 1  capsule (50 mg) by mouth 3 times daily as needed for itching    Panic attack       LORazepam 2 MG tablet    ATIVAN    30 tablet    Take  by mouth 4 times daily.        mirtazapine 30 MG tablet    REMERON     TK 1 T PO HS        order for DME      autoCPAP 8cm        ranitidine 300 MG tablet    ZANTAC    30 tablet    Take 1 tablet (300 mg) by mouth At Bedtime    Gastroesophageal reflux disease, esophagitis presence not specified

## 2018-04-24 NOTE — TELEPHONE ENCOUNTER
....Reason for Call:  Other     Detailed comments: Patient would like a call back he has some questions about something he is dealing with. Patient did not get in detail.    Phone Number Patient can be reached at: Cell number on file:    Telephone Information:   Mobile [phone]       Best Time: anytime    Can we leave a detailed message on this number? YES    Call taken on 4/24/2018 at 11:31 AM by Aguilar Coleman

## 2018-04-24 NOTE — PROGRESS NOTES
"  SUBJECTIVE:   Rafa Cochran is a 38 year old male who presents to clinic today for the following health issues:    CHEST PAIN     Onset: last few days    Description:   Location:  left side  Character: achey  Radiation: None  Duration: 1 day    Intensity: severe    Progression of Symptoms:  intermittent    Accompanying Signs & Symptoms:  Shortness of breath: no  Sweating: YES  Nausea/vomiting: no  Lightheadedness: no  Palpitations: YES-feels pounding  Fever/Chills: no  Cough: YES  Heartburn: YES , patient was placed on Omeprazole but after reading SE, he decided not to take. Patient would like a new medication.    History:   Family history of heart disease YES  Tobacco use: no quit smoking     Precipitating factors:   Worse with exertion: no  Worse with deep breaths :  no  Related to food: YES       Therapies Tried and outcome: None    Patient was studying for his calculus test when he felt chest pounding.  Patient reports that in the last few months his stress level are \"severe\". Patient is currently under psychiatric care and is treated with Ativan and Mirtazapine only.  Patient reports that he has tried most SSRIs to help treat anxiety and developed intolerable SE.     Patient reports that he has gained 50 lbs in the last year and that is causing some increased intolerance to exercise, but he never gets chest pain, shortness of breath or lightheadness with exercise.    Patient reports that his mother and sister have cardiac arrhythmia and he is very concerned that he may inherit that as well .   patient has had normal Holter and echo in 2011.     Problem list and histories reviewed & adjusted, as indicated.  Additional history: as documented    Patient Active Problem List   Diagnosis     Major depression in partial remission (H)     Bipolar affective disorder (H)     OCD (obsessive compulsive disorder)     Schizoaffective disorder (H)     Sinus tachycardia     Palpitations     Hyperlipidemia LDL goal <160     " Hypertriglyceridemia     Prediabetes     Panic attack     BABATUNDE (obstructive sleep apnea)- moderate (AHI 17)     Pain in joint of right shoulder     Sprain and strain of shoulder and upper arm, left, initial encounter     Shoulder pain, left     Hyperglycemia     Morbid obesity due to GERD (H)     Gastroesophageal reflux disease, esophagitis presence not specified     Family history of cardiac arrhythmia     Past Surgical History:   Procedure Laterality Date     APPENDECTOMY         Social History   Substance Use Topics     Smoking status: Former Smoker     Quit date: 12/25/2013     Smokeless tobacco: Never Used      Comment: 5 cigs per day     Alcohol use 0.0 oz/week     0 Standard drinks or equivalent per week      Comment: rare use     Family History   Problem Relation Age of Onset     C.A.D. Maternal Grandfather      DIABETES Maternal Grandfather      Hypertension Maternal Grandfather      HEART DISEASE Maternal Grandfather      C.A.D. Paternal Grandmother      DIABETES Paternal Grandmother      Circulatory Paternal Grandmother      GASTROINTESTINAL DISEASE Paternal Grandmother      C.A.D. Paternal Grandfather      Hypertension Paternal Grandfather      Circulatory Paternal Grandfather      HEART DISEASE Paternal Grandfather      GASTROINTESTINAL DISEASE Paternal Grandfather      Hypertension Father      Alcohol/Drug Father      Allergies Father      Depression Father      GASTROINTESTINAL DISEASE Father      Prostate Cancer Father      CEREBROVASCULAR DISEASE Maternal Grandmother      Arthritis Maternal Grandmother      HEART DISEASE Maternal Grandmother      Alcohol/Drug Mother      Allergies Mother      Circulatory Mother      Depression Mother      HEART DISEASE Mother      DIABETES Mother      Alcohol/Drug Sister      Allergies Sister      Depression Sister      Genitourinary Problems Sister          Current Outpatient Prescriptions   Medication Sig Dispense Refill     hydrOXYzine (VISTARIL) 50 MG capsule  "Take 1 capsule (50 mg) by mouth 3 times daily as needed for itching 60 capsule 1     lorazepam (ATIVAN) 2 MG tablet Take  by mouth 4 times daily. 30 tablet 0     mirtazapine (REMERON) 30 MG tablet TK 1 T PO HS  3     ORDER FOR DME autoCPAP 8cm       ranitidine (ZANTAC) 300 MG tablet Take 1 tablet (300 mg) by mouth At Bedtime 30 tablet 3     Allergies   Allergen Reactions     Nicotine Rash     Nicotine Patches     Pertussis Toxoid      Bad reaction as a child            ROS:  Constitutional, HEENT, cardiovascular, pulmonary, GI, , musculoskeletal, neuro, skin, endocrine and psych systems are negative, except as otherwise noted.    OBJECTIVE:     /64 (BP Location: Right arm, Patient Position: Sitting, Cuff Size: Adult Large)  Pulse 89  Temp 98.1  F (36.7  C) (Oral)  Resp 14  Ht 1.905 m (6' 3\")  Wt 123.5 kg (272 lb 4.8 oz)  SpO2 97%  BMI 34.04 kg/m2  Body mass index is 34.04 kg/(m^2).  GENERAL: healthy, alert, no distress and obese  EYES: Eyes grossly normal to inspection, PERRL and conjunctivae and sclerae normal  HENT: ear canals and TM's normal, nose and mouth without ulcers or lesions  NECK: no adenopathy, no asymmetry, masses, or scars and thyroid normal to palpation  RESP: lungs clear to auscultation - no rales, rhonchi or wheezes  CV: regular rate and rhythm, normal S1 S2, no S3 or S4, no murmur, click or rub, no peripheral edema and peripheral pulses strong  ABDOMEN: soft, nontender, no hepatosplenomegaly, no masses and bowel sounds normal  MS: no gross musculoskeletal defects noted, no edema    Diagnostic Test Results:  EKG - NSR, no acute STwave changes, no Qwaves    ASSESSMENT/PLAN:       ICD-10-CM    1. Panic attack F41.0 hydrOXYzine (VISTARIL) 50 MG capsule   2. Family history of cardiac arrhythmia Z82.49 CARDIOLOGY EVAL ADULT REFERRAL   3. Palpitations R00.2 EKG 12-lead complete w/read - Clinics     CARDIOLOGY EVAL ADULT REFERRAL   4. Gastroesophageal reflux disease, esophagitis presence " not specified K21.9 ranitidine (ZANTAC) 300 MG tablet     1. Patient is followed by a psychiatry clinic.   Patient could not tolerate SSRIs in the past and he can't function on antipsychotics . His anxiety symptoms are worsening  Will try Vistaril as needed for panic symptoms and continue Ativan . Patient will also contact psych for further intervention.   2,3 . Patient had normal cardiac work up in 2011, currently symptom are most likely due to anxiety an stress.  However, due to extensive f/h patient was referred to cardiology for further work up  4. Start Zantac 300 mg at bedtime for 2 weeks to stabilize , then may use as needed     Follow up as needed if worsens   Marina Khanna PA-C  [facility]

## 2018-04-25 ASSESSMENT — PATIENT HEALTH QUESTIONNAIRE - PHQ9: SUM OF ALL RESPONSES TO PHQ QUESTIONS 1-9: 16

## 2018-04-25 ASSESSMENT — ANXIETY QUESTIONNAIRES: GAD7 TOTAL SCORE: 19

## 2018-04-26 NOTE — TELEPHONE ENCOUNTER
Prescription approved per [facility] Refill Protocol.    Cassidy Barber RN, BSN

## 2018-07-06 ENCOUNTER — OFFICE VISIT (OUTPATIENT)
Dept: FAMILY MEDICINE | Facility: CLINIC | Age: 38
End: 2018-07-06
Payer: MEDICARE

## 2018-07-06 VITALS
HEART RATE: 90 BPM | DIASTOLIC BLOOD PRESSURE: 70 MMHG | RESPIRATION RATE: 20 BRPM | BODY MASS INDEX: 34.44 KG/M2 | SYSTOLIC BLOOD PRESSURE: 114 MMHG | OXYGEN SATURATION: 95 % | TEMPERATURE: 99.3 F | HEIGHT: 75 IN | WEIGHT: 277 LBS

## 2018-07-06 DIAGNOSIS — J34.89 SINUS PAIN: Primary | ICD-10-CM

## 2018-07-06 PROCEDURE — 99213 OFFICE O/P EST LOW 20 MIN: CPT | Performed by: NURSE PRACTITIONER

## 2018-07-06 RX ORDER — AMOXICILLIN 500 MG/1
500 CAPSULE ORAL 2 TIMES DAILY
Qty: 14 CAPSULE | Refills: 0 | Status: SHIPPED | OUTPATIENT
Start: 2018-07-06 | End: 2018-07-13

## 2018-07-06 ASSESSMENT — PAIN SCALES - GENERAL: PAINLEVEL: MILD PAIN (2)

## 2018-07-06 NOTE — PROGRESS NOTES
SUBJECTIVE:   Rafa Cochran is a 38 year old male who presents to clinic today for the following health issues:      Acute Illness   Acute illness concerns: Sinus Problem  Onset: 3 weeks    Fever: no    Chills/Sweats: no    Headache (location?): YES    Sinus Pressure:YES-on right side, gets 'shooting pain' into eye, comes and goes    Conjunctivitis:  no    Ear Pain: YES: bilateral    Rhinorrhea: YES    Congestion: YES    Sore Throat: no     Cough: no    Wheeze: no    Decreased Appetite: no    Nausea: no    Vomiting: no    Diarrhea:  no    Dysuria/Freq.: no    Fatigue/Achiness: no    Sick/Strep Exposure: no      Therapies Tried and outcome:  Tylenol helps   right ear pain, and shooting pain into eye. And over spans of days will get pain on right cheek, also having teeth pain. Sleeps with a cpap his nose gets really plugged up. No neuropathy on face.  Denies drooping of facial features. Eyes can get dry, but not consistently dry.     Last at dentist 1 year ago. Has not tried other medication beside tylenol. Called in from work a few days ago. He was having severe pain in right cheek into teeth and sinuses.           Problem list and histories reviewed & adjusted, as indicated.  Additional history: as documented    Patient Active Problem List   Diagnosis     Major depression in partial remission (H)     Bipolar affective disorder (H)     OCD (obsessive compulsive disorder)     Schizoaffective disorder (H)     Sinus tachycardia     Palpitations     Hyperlipidemia LDL goal <160     Hypertriglyceridemia     Prediabetes     Panic attack     BABATUNDE (obstructive sleep apnea)- moderate (AHI 17)     Pain in joint of right shoulder     Sprain and strain of shoulder and upper arm, left, initial encounter     Shoulder pain, left     Hyperglycemia     Morbid obesity due to GERD (H)     Gastroesophageal reflux disease, esophagitis presence not specified     Family history of cardiac arrhythmia     Past Surgical History:   Procedure  "Laterality Date     APPENDECTOMY         Social History   Substance Use Topics     Smoking status: Former Smoker     Quit date: 12/25/2013     Smokeless tobacco: Never Used      Comment: 5 cigs per day     Alcohol use 0.0 oz/week     0 Standard drinks or equivalent per week      Comment: rare use     Family History   Problem Relation Age of Onset     C.A.D. Maternal Grandfather      Diabetes Maternal Grandfather      Hypertension Maternal Grandfather      HEART DISEASE Maternal Grandfather      C.A.D. Paternal Grandmother      Diabetes Paternal Grandmother      Circulatory Paternal Grandmother      GASTROINTESTINAL DISEASE Paternal Grandmother      C.A.D. Paternal Grandfather      Hypertension Paternal Grandfather      Circulatory Paternal Grandfather      HEART DISEASE Paternal Grandfather      GASTROINTESTINAL DISEASE Paternal Grandfather      Hypertension Father      Alcohol/Drug Father      Allergies Father      Depression Father      GASTROINTESTINAL DISEASE Father      Prostate Cancer Father      Cerebrovascular Disease Maternal Grandmother      Arthritis Maternal Grandmother      HEART DISEASE Maternal Grandmother      Alcohol/Drug Mother      Allergies Mother      Circulatory Mother      Depression Mother      HEART DISEASE Mother      Diabetes Mother      Alcohol/Drug Sister      Allergies Sister      Depression Sister      Genitourinary Problems Sister            Reviewed and updated as needed this visit by clinical staff  Tobacco  Allergies  Meds  Problems  Med Hx  Surg Hx  Fam Hx  Soc Hx        Reviewed and updated as needed this visit by Provider  Allergies  Meds  Problems         ROS:  Constitutional, HEENT-as above, cardiovascular, pulmonary, gi and gu systems are negative, except as otherwise noted.    OBJECTIVE:     /70 (BP Location: Right arm, Patient Position: Sitting, Cuff Size: Adult Large)  Pulse 90  Temp 99.3  F (37.4  C) (Oral)  Resp 20  Ht 1.905 m (6' 3\")  Wt 125.6 kg " (277 lb)  SpO2 95%  BMI 34.62 kg/m2  Body mass index is 34.62 kg/(m^2).  GENERAL: healthy, alert and no acute distress  EYES: Eyes grossly normal to inspection, PERRL and conjunctivae and sclerae normal  HENT: ear canals and TM's normal, nose and mouth without ulcers or lesions slight sinus maxillary pain  NECK: no adenopathy, no asymmetry, masses, or scars and thyroid normal to palpation  RESP: lungs clear to auscultation - no rales, rhonchi or wheezes  CV: regular rate and rhythm, normal S1 S2, no S3 or S4, no murmur, click or rub  MS: no gross musculoskeletal defects noted, no edema    Diagnostic Test Results:  none     ASSESSMENT/PLAN:         1. Sinus pain  Trial antibiotic. Has mild sinus pain. Also has occasional 'sharp, shooting pain' on left side of face. Possible Trigeminal neuralgia? Trial carbamazine if antibiotic not helping? Denies trauma or other illness.   - amoxicillin (AMOXIL) 500 MG capsule; Take 1 capsule (500 mg) by mouth 2 times daily for 7 days  Dispense: 14 capsule; Refill: 0    FUTURE APPOINTMENTS:       - Follow-up visit in a week if not improving    NAS Smith, NP-C  [facility]

## 2018-07-06 NOTE — MR AVS SNAPSHOT
"              After Visit Summary   7/6/2018    Rafa Cochran    MRN: [id number]           Patient Information     Date Of Birth          1980        Visit Information        Provider Department      7/6/2018 12:00 PM Francoise An NP [facility]        Today's Diagnoses     Sinus pain    -  1      Care Instructions    Get into dentist  Call next tues/wed if NO better, we will try something else for treatment          Follow-ups after your visit        Who to contact     If you have questions or need follow up information about today's clinic visit or your schedule please contact [facility] directly at [phone].  Normal or non-critical lab and imaging results will be communicated to you by [employer], letter or phone within 4 business days after the clinic has received the results. If you do not hear from us within 7 days, please contact the clinic through [employer] or phone. If you have a critical or abnormal lab result, we will notify you by phone as soon as possible.  Submit refill requests through [employer] or call your pharmacy and they will forward the refill request to us. Please allow 3 business days for your refill to be completed.          Additional Information About Your Visit        MyChart Information     [employer] gives you secure access to your electronic health record. If you see a primary care provider, you can also send messages to your care team and make appointments. If you have questions, please call your primary care clinic.  If you do not have a primary care provider, please call [phone] and they will assist you.        Care EveryWhere ID     This is your Care EveryWhere ID. This could be used by other organizations to access your [place] medical records  [id number]        Your Vitals Were     Pulse Temperature Respirations Height Pulse Oximetry BMI (Body Mass Index)    90 99.3  F (37.4  C) (Oral) 20 1.905 m (6' 3\") 95% 34.62 kg/m2       Blood Pressure " from Last 3 Encounters:   07/06/18 114/70   04/24/18 128/64   01/04/18 128/78    Weight from Last 3 Encounters:   07/06/18 125.6 kg (277 lb)   04/24/18 123.5 kg (272 lb 4.8 oz)   01/04/18 130.1 kg (286 lb 12.8 oz)              Today, you had the following     No orders found for display         Today's Medication Changes          These changes are accurate as of 7/6/18 12:41 PM.  If you have any questions, ask your nurse or doctor.               Start taking these medicines.        Dose/Directions    amoxicillin 500 MG capsule   Commonly known as:  AMOXIL   Used for:  Sinus pain   Started by:  Francoise An NP        Dose:  500 mg   Take 1 capsule (500 mg) by mouth 2 times daily for 7 days   Quantity:  14 capsule   Refills:  0            Where to get your medicines      These medications were sent to [facility] [address] [place] [address] [address] AT [facility] & [address] , [facility] [address]     Phone:  [phone]     amoxicillin 500 MG capsule                Primary Care Provider Office Phone # Fax #    HobbsShanon Payne [phone] [address] [address]        Equal Access to Services     FREDY ALATMIRANO AH: Hadii alonso grijalva hadasho Soomaali, waaxda luqadaha, qaybta kaalmada adeegyada, elisabeth pierson hayjos helms. So [facility] [phone].    ATENCIÓN: Si habla español, tiene a marin disposición servicios gratuitos de asistencia lingüística. [facility] [phone].    We comply with applicable federal civil rights laws and [place] laws. We do not discriminate on the basis of race, color, national origin, age, disability, sex, sexual orientation, or gender identity.            Thank you!     Thank you for choosing [facility]  for your care. Our goal is always to provide you with excellent care. Hearing back from our patients is one way we can continue to improve our services. Please take a few minutes  to complete the written survey that you may receive in the mail after your visit with us. Thank you!             Your Updated Medication List - Protect others around you: Learn how to safely use, store and throw away your medicines at www.disposemymeds.org.          This list is accurate as of 7/6/18 12:41 PM.  Always use your most recent med list.                   Brand Name Dispense Instructions for use Diagnosis    amoxicillin 500 MG capsule    AMOXIL    14 capsule    Take 1 capsule (500 mg) by mouth 2 times daily for 7 days    Sinus pain       hydrOXYzine 50 MG capsule    VISTARIL    60 capsule    Take 1 capsule (50 mg) by mouth 3 times daily as needed for itching    Panic attack       LORazepam 2 MG tablet    ATIVAN    30 tablet    Take  by mouth 4 times daily.        mirtazapine 30 MG tablet    REMERON     TK 1 T PO HS        order for DME      autoCPAP 8cm        ranitidine 300 MG tablet    ZANTAC    90 tablet    TAKE 1 TABLET(300 MG) BY MOUTH AT BEDTIME    Gastroesophageal reflux disease, esophagitis presence not specified

## 2018-08-23 ENCOUNTER — OFFICE VISIT (OUTPATIENT)
Dept: URGENT CARE | Facility: URGENT CARE | Age: 38
End: 2018-08-23
Payer: MEDICARE

## 2018-08-23 ENCOUNTER — ALLIED HEALTH/NURSE VISIT (OUTPATIENT)
Dept: NURSING | Facility: CLINIC | Age: 38
End: 2018-08-23
Payer: MEDICARE

## 2018-08-23 ENCOUNTER — TELEPHONE (OUTPATIENT)
Dept: FAMILY MEDICINE | Facility: CLINIC | Age: 38
End: 2018-08-23

## 2018-08-23 VITALS
SYSTOLIC BLOOD PRESSURE: 133 MMHG | RESPIRATION RATE: 18 BRPM | OXYGEN SATURATION: 98 % | HEART RATE: 97 BPM | DIASTOLIC BLOOD PRESSURE: 88 MMHG | TEMPERATURE: 97.8 F

## 2018-08-23 VITALS
HEART RATE: 97 BPM | OXYGEN SATURATION: 98 % | RESPIRATION RATE: 18 BRPM | DIASTOLIC BLOOD PRESSURE: 88 MMHG | TEMPERATURE: 97.8 F | SYSTOLIC BLOOD PRESSURE: 133 MMHG

## 2018-08-23 DIAGNOSIS — R42 DIZZINESS: Primary | ICD-10-CM

## 2018-08-23 DIAGNOSIS — R10.13 ABDOMINAL PAIN, EPIGASTRIC: Primary | ICD-10-CM

## 2018-08-23 DIAGNOSIS — F41.9 ANXIETY: ICD-10-CM

## 2018-08-23 PROCEDURE — 99214 OFFICE O/P EST MOD 30 MIN: CPT | Performed by: NURSE PRACTITIONER

## 2018-08-23 ASSESSMENT — ENCOUNTER SYMPTOMS
COUGH: 0
RHINORRHEA: 0
DIARRHEA: 0
SHORTNESS OF BREATH: 0
CHILLS: 0
SORE THROAT: 0
ABDOMINAL PAIN: 1
VOMITING: 0
NAUSEA: 0
DIAPHORESIS: 0
FEVER: 0
NERVOUS/ANXIOUS: 1

## 2018-08-23 ASSESSMENT — PAIN SCALES - GENERAL
PAINLEVEL: NO PAIN (0)
PAINLEVEL: NO PAIN (0)

## 2018-08-23 NOTE — NURSING NOTE
"RN Triage:     Chief Complaint   Patient presents with     Dizziness         Initial /88 (Cuff Size: Adult Regular)  Pulse 97  Temp 97.8  F (36.6  C) (Oral)  Resp 18  SpO2 98% Estimated body mass index is 34.62 kg/(m^2) as calculated from the following:    Height as of 7/6/18: 6' 3\" (1.905 m).    Weight as of 7/6/18: 277 lb (125.6 kg).  BP completed using cuff size: regular     Assessment:  Patient drove to clinic c/o lightheaded.  Patient missed an appointment, went to wrong clinic.  \"Yesterday had a panic attack with palpitations as something was pounding on a chest,\" patient said.  For a week had a heartburn, has not taken anything OTC, lost zantac pills. Patient had a tuna sandwich for breakfast and has been drinking fluids.  Patient appeared to be in emotional distress.     Triage Dispo: Huddle with Provider to determine plan: for pt to be seen.      Intervention: to be seen     Renea Delarosa RN  "

## 2018-08-23 NOTE — PROGRESS NOTES
SUBJECTIVE:   Rafa Cochran is a 38 year old male presenting with a chief complaint of   Chief Complaint   Patient presents with     Abdominal Pain     Anxiety       He is an established patient of [facility].    anxiety    Onset of symptoms was 1 week(s) ago.  Course of illness is worsening.    Severity moderate  Current and Associated symptoms: anxious  Treatment measures tried include schizophrenia medication.  Predisposing factors include: Schizophrenia.      Abdominal Pain    Location: epigastric   Radiation: None.    Pain character: aching,burning   Severity: 4 on a scale of 1-10.    Duration: 3 day(s)   Course of Illness: slowly progressive.  Exacerbated by: eating and recumbency  Relieved by: nothing.  Associated Symptoms: none.  Female : Not applicable  Surgical History: none        Review of Systems   Constitutional: Negative for chills, diaphoresis and fever.   HENT: Negative for congestion, ear pain, rhinorrhea and sore throat.    Respiratory: Negative for cough and shortness of breath.    Gastrointestinal: Positive for abdominal pain. Negative for diarrhea, nausea and vomiting.   Psychiatric/Behavioral: The patient is nervous/anxious.    All other systems reviewed and are negative.      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 Maternal Grandfather      Diabetes Maternal Grandfather      Hypertension Maternal Grandfather      HEART DISEASE Maternal Grandfather      C.A.D. Paternal Grandmother      Diabetes Paternal Grandmother      Circulatory Paternal Grandmother      GASTROINTESTINAL DISEASE Paternal Grandmother      C.A.D. Paternal Grandfather      Hypertension Paternal Grandfather      Circulatory Paternal Grandfather      HEART DISEASE Paternal Grandfather      GASTROINTESTINAL DISEASE Paternal Grandfather      Hypertension Father      Alcohol/Drug Father       Allergies Father      Depression Father      GASTROINTESTINAL DISEASE Father      Prostate Cancer Father      Cerebrovascular Disease Maternal Grandmother      Arthritis Maternal Grandmother      HEART DISEASE Maternal Grandmother      Alcohol/Drug Mother      Allergies Mother      Circulatory Mother      Depression Mother      HEART DISEASE Mother      Diabetes Mother      Alcohol/Drug Sister      Allergies Sister      Depression Sister      Genitourinary Problems Sister      Current Outpatient Prescriptions   Medication Sig Dispense Refill     lorazepam (ATIVAN) 2 MG tablet Take  by mouth 4 times daily. 30 tablet 0     mirtazapine (REMERON) 30 MG tablet TK 1 T PO HS  3     ranitidine (ZANTAC) 150 MG tablet Take 1 tablet (150 mg) by mouth 2 times daily 60 tablet 0     hydrOXYzine (VISTARIL) 50 MG capsule Take 1 capsule (50 mg) by mouth 3 times daily as needed for itching (Patient not taking: Reported on 8/23/2018) 60 capsule 1     ORDER FOR DME autoCPAP 8cm (Patient not taking: Reported on 8/23/2018)       Social History   Substance Use Topics     Smoking status: Former Smoker     Quit date: 12/25/2013     Smokeless tobacco: Never Used      Comment: 5 cigs per day     Alcohol use 0.0 oz/week     0 Standard drinks or equivalent per week      Comment: rare use       OBJECTIVE  /88 (Cuff Size: Adult Regular)  Pulse 97  Temp 97.8  F (36.6  C) (Oral)  Resp 18  SpO2 98%    Physical Exam   Cardiovascular: Normal rate and normal heart sounds.    Pulmonary/Chest: Effort normal and breath sounds normal.   Abdominal: Soft. Bowel sounds are normal. He exhibits no distension and no mass. There is no tenderness. There is no rebound and no guarding. No hernia.   Psychiatric: He has a normal mood and affect. His behavior is normal.   Vitals reviewed.      ASSESSMENT:      ICD-10-CM    1. Abdominal pain, epigastric R10.13 ranitidine (ZANTAC) 150 MG tablet   2. Anxiety F41.9           PLAN:  Patient  educational/instructional material provided including reasons for follow-up   Follow up with PCP   The patient indicates understanding of these issues and agrees with the plan.

## 2018-08-23 NOTE — MR AVS SNAPSHOT
After Visit Summary   8/23/2018    Rafa Cochran    MRN: [id number]           Patient Information     Date Of Birth          1980        Visit Information        Provider Department      8/23/2018 3:00 PM BK RN [facility]        Today's Diagnoses     Dizziness    -  1       Follow-ups after your visit        Your next 10 appointments already scheduled     Aug 24, 2018 11:00 AM CDT   Office Visit with Marina Khanna PA-C   [facility] ([facility])    [address] [place] [id number] [phone]           Bring a current list of meds and any records pertaining to this visit. For Physicals, please bring immunization records and any forms needing to be filled out. Please arrive 10 minutes early to complete paperwork.              Who to contact     If you have questions or need follow up information about today's clinic visit or your schedule please contact [facility] directly at [phone].  Normal or non-critical lab and imaging results will be communicated to you by Zero9hart, letter or phone within 4 business days after the clinic has received the results. If you do not hear from us within 7 days, please contact the clinic through Connollyt or phone. If you have a critical or abnormal lab result, we will notify you by phone as soon as possible.  Submit refill requests through Wonderswamp or call your pharmacy and they will forward the refill request to us. Please allow 3 business days for your refill to be completed.          Additional Information About Your Visit        Zero9hart Information     Wonderswamp gives you secure access to your electronic health record. If you see a primary care provider, you can also send messages to your care team and make appointments. If you have questions, please call your primary care clinic.  If you do not have a primary care provider, please call  [phone] and they will assist you.        Care EveryWhere ID     This is your Care EveryWhere ID. This could be used by other organizations to access your [facility] medical records  [id number]        Your Vitals Were     Pulse Temperature Respirations Pulse Oximetry          97 97.8  F (36.6  C) (Oral) 18 98%         Blood Pressure from Last 3 Encounters:   08/23/18 133/88   08/23/18 133/88   07/06/18 114/70    Weight from Last 3 Encounters:   07/06/18 277 lb (125.6 kg)   04/24/18 272 lb 4.8 oz (123.5 kg)   01/04/18 286 lb 12.8 oz (130.1 kg)              Today, you had the following     No orders found for display       Primary Care Provider Office Phone # Fax #    Linda Payne [phone] [address] [address]        Equal Access to Services     [facility] : Hadii aad ku hadasho Soomaali, waaxda luqadaha, qaybta kaalmada adeegyada, waxay idiin hayjose an misa ross . So [facility] [phone].    ATENCIÓN: Si habla español, tiene a marin disposición servicios gratuitos de asistencia lingüística. Llame al [phone].    We comply with applicable federal civil rights laws and [place] laws. We do not discriminate on the basis of race, color, national origin, age, disability, sex, sexual orientation, or gender identity.            Thank you!     Thank you for choosing [facility]  for your care. Our goal is always to provide you with excellent care. Hearing back from our patients is one way we can continue to improve our services. Please take a few minutes to complete the written survey that you may receive in the mail after your visit with us. Thank you!             Your Updated Medication List - Protect others around you: Learn how to safely use, store and throw away your medicines at www.disposemymeds.org.          This list is accurate as of 8/23/18  4:17 PM.  Always use your most recent med list.                   Brand Name  Dispense Instructions for use Diagnosis    hydrOXYzine 50 MG capsule    VISTARIL    60 capsule    Take 1 capsule (50 mg) by mouth 3 times daily as needed for itching    Panic attack       LORazepam 2 MG tablet    ATIVAN    30 tablet    Take  by mouth 4 times daily.        mirtazapine 30 MG tablet    REMERON     TK 1 T PO HS        order for DME      autoCPAP 8cm        ranitidine 300 MG tablet    ZANTAC    90 tablet    TAKE 1 TABLET(300 MG) BY MOUTH AT BEDTIME    Gastroesophageal reflux disease, esophagitis presence not specified

## 2018-08-23 NOTE — MR AVS SNAPSHOT
After Visit Summary   8/23/2018    Rafa Cochran    MRN: [id number]           Patient Information     Date Of Birth          1980        Visit Information        Provider Department      8/23/2018 4:00 PM Hien Fonseca NP [facility]        Today's Diagnoses     Abdominal pain, epigastric    -  1    Anxiety          Care Instructions      Epigastric Pain (Uncertain Cause)     Epigastric pain can be a sign of disease in the upper abdomen. Common causes include:    Acid reflux (stomach acid flowing up into the esophagus)    Gastritis (irritation of the stomach lining)    Peptic Ulcer Disease    Inflammation of the pancreas    Gallstone    Infection in the gallbladder  Pain may be dull or burning. It may spread upward to the chest or to the back. There may be other symptoms such as belching, bloating, cramps or hunger pains. There may be weight loss or poor appetite, nausea or vomiting.  Since the diagnosis of your pain is not certain yet, further tests may sometimes be needed. Sometimes the doctor will treat you for the most likely condition to see if there is improvement before doing further tests.  Home care  Medicines    Antacids help neutralize the normal acids in your stomach. Examples are Maalox, Mylanta, Rolaids, and Tums. If you don t like the liquid, you can also try a chewable one. You may find one works better than another for you. Overuse can cause diarrhea or constipation.    Acid blockers (H2 blockers) decrease acid production. Examples are cimetidine (Tagamet), famotidine (Pepcid) and ranitidine (Zantac).    Acid inhibitors (PPIs) decrease acid production in a different way than the blockers. You may find they work better, but can take a little longer to take effect.  Examples are omeprazole (Prilosec), lansoprazole (Prevacid), pantoprazole (Protonix), rabeprazole (Aciphex), and esomeprazole (Nexium).    Take an antacid 30-60 minutes after eating and at bedtime, but  not at the same time as an acid blocker.    Try not to take NSAIDs. Aspirin may also cause problems, but if taking it for your heart or other medical reasons, talk to your doctor before stopping it; you do not want to cause a worse problem, like a heart attack or stroke.  Diet    If certain foods seem to cause your spasm, try to avoid them.     Eat slowly and chew food well before swallowing. Symptoms of gastritis can be worsened by certain foods. Limit or avoid fatty, fried, and spicy foods, as well as coffee, chocolate, mint, and foods with high acid content such as tomatoes and citrus fruit and juices (orange, grapefruit, lemon).    Avoid alcohol, caffeine, and tobacco, which can delay healing and worsen your problem.    Try eating smaller meals with snacks in between  Follow-up care  Follow up with your healthcare provider or as advised.  When to seek medical advice  Call your healthcare provider right away if any of the following occur:    Stomach pain worsens or moves to the right lower part of the abdomen    Chest pain appears, or if it worsens or spreads to the chest, back, neck, shoulder, or arm    Frequent vomiting (can t keep down liquids)    Blood in the stool or vomit (red or black color)    Feeling weak or dizzy, fainting, or having trouble breathing    Fever of 100.4 F (38 C) or higher, or as directed by your healthcare provider    Abdominal swelling  Date Last Reviewed: 9/25/2015 2000-2017 [employer]. [address] [place], [place] [id number]. All rights reserved. This information is not intended as a substitute for professional medical care. Always follow your healthcare professional's instructions.                Follow-ups after your visit        Your next 10 appointments already scheduled     Aug 24, 2018 11:00 AM CDT   Office Visit with Marina Khanna PA-C   [facility] ([facility])    [address] [place] [address]  [place] [address]   [phone]           Bring a current list of meds and any records pertaining to this visit. For Physicals, please bring immunization records and any forms needing to be filled out. Please arrive 10 minutes early to complete paperwork.              Who to contact     If you have questions or need follow up information about today's clinic visit or your schedule please contact [facility] directly at [phone].  Normal or non-critical lab and imaging results will be communicated to you by Spinlogic Technologieshart, letter or phone within 4 business days after the clinic has received the results. If you do not hear from us within 7 days, please contact the clinic through Inaika or phone. If you have a critical or abnormal lab result, we will notify you by phone as soon as possible.  Submit refill requests through Inaika or call your pharmacy and they will forward the refill request to us. Please allow 3 business days for your refill to be completed.          Additional Information About Your Visit        Spinlogic TechnologiesharAhalogy Information     Inaika gives you secure access to your electronic health record. If you see a primary care provider, you can also send messages to your care team and make appointments. If you have questions, please call your primary care clinic.  If you do not have a primary care provider, please call [phone] and they will assist you.        Care EveryWhere ID     This is your Care EveryWhere ID. This could be used by other organizations to access your [facility] medical records  [id number]        Your Vitals Were     Pulse Temperature Respirations Pulse Oximetry          97 97.8  F (36.6  C) (Oral) 18 98%         Blood Pressure from Last 3 Encounters:   08/23/18 133/88   08/23/18 133/88   07/06/18 114/70    Weight from Last 3 Encounters:   07/06/18 277 lb (125.6 kg)   04/24/18 272 lb 4.8 oz (123.5 kg)   01/04/18 286 lb 12.8 oz (130.1 kg)              Today, you had the following      No orders found for display         Today's Medication Changes          These changes are accurate as of 8/23/18  4:37 PM.  If you have any questions, ask your nurse or doctor.               These medicines have changed or have updated prescriptions.        Dose/Directions    ranitidine 150 MG tablet   Commonly known as:  ZANTAC   This may have changed:    - medication strength  - See the new instructions.   Used for:  Abdominal pain, epigastric   Changed by:  Hien Fonseca NP        Dose:  150 mg   Take 1 tablet (150 mg) by mouth 2 times daily   Quantity:  60 tablet   Refills:  0            Where to get your medicines      These medications were sent to [id number] - [place] [address] AT [facility] & [address] , [facility] [address]     Phone:  [phone]     ranitidine 150 MG tablet                Primary Care Provider Office Phone # Fax #    ElwinShanon Payne [phone] [address] [address]        Equal Access to Services     [facility]: Hadii aad ku hadasho Soomaali, waaxda luqadaha, qaybta kaalmada adeegyada, waxay shandrain hayjos ross . So [facility] [phone].    ATENCIÓN: Si habla español, tiene a marin disposición servicios gratuitos de asistencia lingüística. [facility] [phone].    We comply with applicable federal civil rights laws and [place] laws. We do not discriminate on the basis of race, color, national origin, age, disability, sex, sexual orientation, or gender identity.            Thank you!     Thank you for choosing [facility]  for your care. Our goal is always to provide you with excellent care. Hearing back from our patients is one way we can continue to improve our services. Please take a few minutes to complete the written survey that you may receive in the mail after your visit with us. Thank you!             Your Updated Medication List -  Protect others around you: Learn how to safely use, store and throw away your medicines at www.disposemymeds.org.          This list is accurate as of 8/23/18  4:37 PM.  Always use your most recent med list.                   Brand Name Dispense Instructions for use Diagnosis    hydrOXYzine 50 MG capsule    VISTARIL    60 capsule    Take 1 capsule (50 mg) by mouth 3 times daily as needed for itching    Panic attack       LORazepam 2 MG tablet    ATIVAN    30 tablet    Take  by mouth 4 times daily.        mirtazapine 30 MG tablet    REMERON     TK 1 T PO HS        order for DME      autoCPAP 8cm        ranitidine 150 MG tablet    ZANTAC    60 tablet    Take 1 tablet (150 mg) by mouth 2 times daily    Abdominal pain, epigastric

## 2018-08-23 NOTE — PATIENT INSTRUCTIONS
Epigastric Pain (Uncertain Cause)     Epigastric pain can be a sign of disease in the upper abdomen. Common causes include:    Acid reflux (stomach acid flowing up into the esophagus)    Gastritis (irritation of the stomach lining)    Peptic Ulcer Disease    Inflammation of the pancreas    Gallstone    Infection in the gallbladder  Pain may be dull or burning. It may spread upward to the chest or to the back. There may be other symptoms such as belching, bloating, cramps or hunger pains. There may be weight loss or poor appetite, nausea or vomiting.  Since the diagnosis of your pain is not certain yet, further tests may sometimes be needed. Sometimes the doctor will treat you for the most likely condition to see if there is improvement before doing further tests.  Home care  Medicines    Antacids help neutralize the normal acids in your stomach. Examples are Maalox, Mylanta, Rolaids, and Tums. If you don t like the liquid, you can also try a chewable one. You may find one works better than another for you. Overuse can cause diarrhea or constipation.    Acid blockers (H2 blockers) decrease acid production. Examples are cimetidine (Tagamet), famotidine (Pepcid) and ranitidine (Zantac).    Acid inhibitors (PPIs) decrease acid production in a different way than the blockers. You may find they work better, but can take a little longer to take effect.  Examples are omeprazole (Prilosec), lansoprazole (Prevacid), pantoprazole (Protonix), rabeprazole (Aciphex), and esomeprazole (Nexium).    Take an antacid 30-60 minutes after eating and at bedtime, but not at the same time as an acid blocker.    Try not to take NSAIDs. Aspirin may also cause problems, but if taking it for your heart or other medical reasons, talk to your doctor before stopping it; you do not want to cause a worse problem, like a heart attack or stroke.  Diet    If certain foods seem to cause your spasm, try to avoid them.     Eat slowly and chew food well  before swallowing. Symptoms of gastritis can be worsened by certain foods. Limit or avoid fatty, fried, and spicy foods, as well as coffee, chocolate, mint, and foods with high acid content such as tomatoes and citrus fruit and juices (orange, grapefruit, lemon).    Avoid alcohol, caffeine, and tobacco, which can delay healing and worsen your problem.    Try eating smaller meals with snacks in between  Follow-up care  Follow up with your healthcare provider or as advised.  When to seek medical advice  Call your healthcare provider right away if any of the following occur:    Stomach pain worsens or moves to the right lower part of the abdomen    Chest pain appears, or if it worsens or spreads to the chest, back, neck, shoulder, or arm    Frequent vomiting (can t keep down liquids)    Blood in the stool or vomit (red or black color)    Feeling weak or dizzy, fainting, or having trouble breathing    Fever of 100.4 F (38 C) or higher, or as directed by your healthcare provider    Abdominal swelling  Date Last Reviewed: 9/25/2015 2000-2017 [employer]. [address] [place] [address], [place] [address]. All rights reserved. This information is not intended as a substitute for professional medical care. Always follow your healthcare professional's instructions.

## 2018-08-23 NOTE — TELEPHONE ENCOUNTER
.Reason for call:  Patient reporting a symptom    Symptom or request: shallow heart beats/ dizziness     Duration (how long have symptoms been present): yesterday     Have you been treated for this before? No    Additional comments: Patient stated that he possibly hard panic attack yesterdays and today he is not feeling well, experiencing some shallow heartbeat and dizziness.     Phone Number patient can be reached at:  Cell number on file:    Telephone Information:   Mobile [phone]       Best Time:  any    Can we leave a detailed message on this number:  YES    Call taken on 8/23/2018 at 10:04 AM by Oriana Britt

## 2018-08-23 NOTE — TELEPHONE ENCOUNTER
Next 5 appointments (look out 90 days)     Aug 23, 2018  2:40 PM CDT   Office Visit with Marina Khanna PA-C   [facility]    [address] [place] [id number]   [phone]                Patient is scheduled for office visit today with provider. Patient has a history of panic attacks in the past that he has been seen for in clinic and the ED.     This writer attempted to contact Rafa on 08/23/18      Reason for call triage symptoms and left message. Notified patient he should be seen in clinic today for his schedule appointment.      If patient calls back:   Patient contacted by a Registered Nurse. Inform patient that someone from the RN group will contact them, document that pt called and route to P DYAD 3 RN POOL [[id number]]    Cassidy Barber RN, BSN

## 2018-08-24 ENCOUNTER — OFFICE VISIT (OUTPATIENT)
Dept: FAMILY MEDICINE | Facility: CLINIC | Age: 38
End: 2018-08-24
Payer: MEDICARE

## 2018-08-24 ENCOUNTER — TELEPHONE (OUTPATIENT)
Dept: FAMILY MEDICINE | Facility: CLINIC | Age: 38
End: 2018-08-24

## 2018-08-24 VITALS
BODY MASS INDEX: 33.85 KG/M2 | HEART RATE: 84 BPM | DIASTOLIC BLOOD PRESSURE: 83 MMHG | WEIGHT: 272.2 LBS | SYSTOLIC BLOOD PRESSURE: 125 MMHG | OXYGEN SATURATION: 97 % | TEMPERATURE: 98.5 F | RESPIRATION RATE: 24 BRPM | HEIGHT: 75 IN

## 2018-08-24 DIAGNOSIS — F25.9 SCHIZOAFFECTIVE DISORDER, UNSPECIFIED TYPE (H): Primary | Chronic | ICD-10-CM

## 2018-08-24 DIAGNOSIS — K21.9 GASTROESOPHAGEAL REFLUX DISEASE, ESOPHAGITIS PRESENCE NOT SPECIFIED: ICD-10-CM

## 2018-08-24 PROCEDURE — 99214 OFFICE O/P EST MOD 30 MIN: CPT | Performed by: PHYSICIAN ASSISTANT

## 2018-08-24 ASSESSMENT — ANXIETY QUESTIONNAIRES
1. FEELING NERVOUS, ANXIOUS, OR ON EDGE: NEARLY EVERY DAY
GAD7 TOTAL SCORE: 21
5. BEING SO RESTLESS THAT IT IS HARD TO SIT STILL: NEARLY EVERY DAY
2. NOT BEING ABLE TO STOP OR CONTROL WORRYING: NEARLY EVERY DAY
3. WORRYING TOO MUCH ABOUT DIFFERENT THINGS: NEARLY EVERY DAY
7. FEELING AFRAID AS IF SOMETHING AWFUL MIGHT HAPPEN: NEARLY EVERY DAY
IF YOU CHECKED OFF ANY PROBLEMS ON THIS QUESTIONNAIRE, HOW DIFFICULT HAVE THESE PROBLEMS MADE IT FOR YOU TO DO YOUR WORK, TAKE CARE OF THINGS AT HOME, OR GET ALONG WITH OTHER PEOPLE: EXTREMELY DIFFICULT
6. BECOMING EASILY ANNOYED OR IRRITABLE: NEARLY EVERY DAY

## 2018-08-24 ASSESSMENT — PAIN SCALES - GENERAL: PAINLEVEL: MILD PAIN (2)

## 2018-08-24 ASSESSMENT — PATIENT HEALTH QUESTIONNAIRE - PHQ9: 5. POOR APPETITE OR OVEREATING: NEARLY EVERY DAY

## 2018-08-24 NOTE — MR AVS SNAPSHOT
After Visit Summary   8/24/2018    Rafa Cochran    MRN: [id number]           Patient Information     Date Of Birth          1980        Visit Information        Provider Department      8/24/2018 11:00 AM Marina Khanna PA-C [facility]        Today's Diagnoses     Schizoaffective disorder, unspecified type (H)    -  1    Gastroesophageal reflux disease, esophagitis presence not specified          Care Instructions    Take Zantac 300 mg (2 capsules ) at bedtime for esophagitis     Bring stool test     Continue mental health groups and treatment with psychiatrist  Tips to Control Acid Reflux    To control acid reflux, you ll need to make some basic diet and lifestyle changes. The simple steps outlined below may be all you ll need to ease discomfort.  Watch what you eat    Avoid fatty foods and spicy foods.    Eat fewer acidic foods, such as citrus and tomato-based foods. These can increase symptoms.    Limit drinking alcohol, caffeine, and fizzy beverages. All increase acid reflux.    Try limiting chocolate, peppermint, and spearmint. These can worsen acid reflux in some people.  Watch when you eat    Avoid lying down for 3 hours after eating.    Do not snack before going to bed.  Raise your head  Raising your head and upper body by 4 to 6 inches helps limit reflux when you re lying down. Put blocks under the head of your bed frame to raise it.  Other changes    Lose weight, if you need to    Don t exercise near bedtime    Avoid tight-fitting clothes    Limit aspirin and ibuprofen    Stop smoking   Date Last Reviewed: 7/1/2016 2000-2017 [employer]. [address] [place] [address], [place] [address]. All rights reserved. This information is not intended as a substitute for professional medical care. Always follow your healthcare professional's instructions.                Follow-ups after your visit        Future tests that were ordered for you today      "Open Future Orders        Priority Expected Expires Ordered    H Pylori antigen, stool Routine  9/23/2018 8/24/2018            Who to contact     If you have questions or need follow up information about today's clinic visit or your schedule please contact [facility] directly at [phone].  Normal or non-critical lab and imaging results will be communicated to you by MyChart, letter or phone within 4 business days after the clinic has received the results. If you do not hear from us within 7 days, please contact the clinic through SoCAThart or phone. If you have a critical or abnormal lab result, we will notify you by phone as soon as possible.  Submit refill requests through Ravn or call your pharmacy and they will forward the refill request to us. Please allow 3 business days for your refill to be completed.          Additional Information About Your Visit        MyChart Information     Ravn gives you secure access to your electronic health record. If you see a primary care provider, you can also send messages to your care team and make appointments. If you have questions, please call your primary care clinic.  If you do not have a primary care provider, please call [phone] and they will assist you.        Care EveryWhere ID     This is your Care EveryWhere ID. This could be used by other organizations to access your [facility] medical records  [id number]        Your Vitals Were     Pulse Temperature Respirations Height Pulse Oximetry BMI (Body Mass Index)    108 98.5  F (36.9  C) (Oral) 24 6' 3\" (1.905 m) 97% 34.02 kg/m2       Blood Pressure from Last 3 Encounters:   08/24/18 125/83   08/23/18 133/88   08/23/18 133/88    Weight from Last 3 Encounters:   08/24/18 272 lb 3.2 oz (123.5 kg)   07/06/18 277 lb (125.6 kg)   04/24/18 272 lb 4.8 oz (123.5 kg)               Primary Care Provider Office Phone # Fax #    [facility] [phone] [address] [facility] [address]  [facility] [address]        Equal Access to Services     FREDY ALTAMIRANO : Hadii aad ku hadbartmayra Sharma, waaxda luqadaha, qaybta milagroskylahmel sandoval, elisabeth rodriguezmikalarosa maria helms. So [facility] [phone].    ATENCIÓN: Si habla español, tiene a marin disposición servicios gratuitos de asistencia lingüística. Llame al [phone].    We comply with applicable federal civil rights laws and [place] laws. We do not discriminate on the basis of race, color, national origin, age, disability, sex, sexual orientation, or gender identity.            Thank you!     Thank you for choosing [facility]  for your care. Our goal is always to provide you with excellent care. Hearing back from our patients is one way we can continue to improve our services. Please take a few minutes to complete the written survey that you may receive in the mail after your visit with us. Thank you!             Your Updated Medication List - Protect others around you: Learn how to safely use, store and throw away your medicines at www.disposemymeds.org.          This list is accurate as of 8/24/18 11:31 AM.  Always use your most recent med list.                   Brand Name Dispense Instructions for use Diagnosis    hydrOXYzine 50 MG capsule    VISTARIL    60 capsule    Take 1 capsule (50 mg) by mouth 3 times daily as needed for itching    Panic attack       LORazepam 2 MG tablet    ATIVAN    30 tablet    Take  by mouth 4 times daily.        mirtazapine 30 MG tablet    REMERON     TK 1 T PO HS        order for DME      autoCPAP 8cm        ranitidine 150 MG tablet    ZANTAC    60 tablet    Take 1 tablet (150 mg) by mouth 2 times daily    Abdominal pain, epigastric

## 2018-08-24 NOTE — PATIENT INSTRUCTIONS
Take Zantac 300 mg (2 capsules ) at bedtime for esophagitis     Bring stool test     Continue mental health groups and treatment with psychiatrist  Tips to Control Acid Reflux    To control acid reflux, you ll need to make some basic diet and lifestyle changes. The simple steps outlined below may be all you ll need to ease discomfort.  Watch what you eat    Avoid fatty foods and spicy foods.    Eat fewer acidic foods, such as citrus and tomato-based foods. These can increase symptoms.    Limit drinking alcohol, caffeine, and fizzy beverages. All increase acid reflux.    Try limiting chocolate, peppermint, and spearmint. These can worsen acid reflux in some people.  Watch when you eat    Avoid lying down for 3 hours after eating.    Do not snack before going to bed.  Raise your head  Raising your head and upper body by 4 to 6 inches helps limit reflux when you re lying down. Put blocks under the head of your bed frame to raise it.  Other changes    Lose weight, if you need to    Don t exercise near bedtime    Avoid tight-fitting clothes    Limit aspirin and ibuprofen    Stop smoking   Date Last Reviewed: 7/1/2016 2000-2017 [employer]. [address] [place] [address], [place] [address]. All rights reserved. This information is not intended as a substitute for professional medical care. Always follow your healthcare professional's instructions.

## 2018-08-24 NOTE — TELEPHONE ENCOUNTER
[facility] is faxing request for ranitidine (ZANTAC) 150 MG tablet to be changed to a 90 day supply.    Qty 180    Please send new rx if appropriate.

## 2018-08-24 NOTE — TELEPHONE ENCOUNTER
Patient triaged by RN yesterday in clinic and was then seen in Urgent Care. Patient is scheduled at 11 AM today for follow-up appointment with MAIA Khanna in clinic.     Kim Mueller RN

## 2018-08-24 NOTE — PROGRESS NOTES
SUBJECTIVE:   Rafa Cochran is a 38 year old male who presents to clinic today for the following health issues:    ED/UC Followup:    Facility:  [facility]   Date of visit: 07/23/2018  Reason for visit: GERD and anxiety   Current Status: GERD not as bad, anxiety worsened     Patient is here because he started to take Zantac and it helps with GERD, but he has severe anxiety associated with taking new medications.   Patient has a h/o being forced to take medications for his schizophrenia as a teenager and now gets severe anxiety with every new medication.   Patient has read the SE insert for Zantac and it mentioned palpitations, so he is very concerned and can't calm down panic attack that it triggered.   Patient reports that he gets severe heartburn at night. He wakes up and can't fall asleep, which exacerbates anxiety and caused him to go to ED a few times.   Patient has a h/o GERD and esophagitis, he had upper GI study 10 years ago that confirmed esophagitis.  At the time patient was drinking a lot. Patient reports that he does not drink anymore, but acidic, food, coffee and greasy food trigger GERD now.     Problem list and histories reviewed & adjusted, as indicated.  Additional history: as documented    Patient Active Problem List   Diagnosis     Major depression in partial remission (H)     Bipolar affective disorder (H)     OCD (obsessive compulsive disorder)     Schizoaffective disorder (H)     Sinus tachycardia     Palpitations     Hyperlipidemia LDL goal <160     Hypertriglyceridemia     Prediabetes     Panic attack     BABATUNDE (obstructive sleep apnea)- moderate (AHI 17)     Pain in joint of right shoulder     Sprain and strain of shoulder and upper arm, left, initial encounter     Shoulder pain, left     Hyperglycemia     Morbid obesity due to GERD (H)     Gastroesophageal reflux disease, esophagitis presence not specified     Family history of cardiac arrhythmia     Past Surgical History:    Procedure Laterality Date     APPENDECTOMY         Social History   Substance Use Topics     Smoking status: Former Smoker     Quit date: 12/25/2013     Smokeless tobacco: Never Used      Comment: 5 cigs per day     Alcohol use 0.0 oz/week     0 Standard drinks or equivalent per week      Comment: rare use     Family History   Problem Relation Age of Onset     C.A.D. Maternal Grandfather      Diabetes Maternal Grandfather      Hypertension Maternal Grandfather      HEART DISEASE Maternal Grandfather      C.A.D. Paternal Grandmother      Diabetes Paternal Grandmother      Circulatory Paternal Grandmother      GASTROINTESTINAL DISEASE Paternal Grandmother      C.A.D. Paternal Grandfather      Hypertension Paternal Grandfather      Circulatory Paternal Grandfather      HEART DISEASE Paternal Grandfather      GASTROINTESTINAL DISEASE Paternal Grandfather      Hypertension Father      Alcohol/Drug Father      Allergies Father      Depression Father      GASTROINTESTINAL DISEASE Father      Prostate Cancer Father      Cerebrovascular Disease Maternal Grandmother      Arthritis Maternal Grandmother      HEART DISEASE Maternal Grandmother      Alcohol/Drug Mother      Allergies Mother      Circulatory Mother      Depression Mother      HEART DISEASE Mother      Diabetes Mother      Alcohol/Drug Sister      Allergies Sister      Depression Sister      Genitourinary Problems Sister          Current Outpatient Prescriptions   Medication Sig Dispense Refill     lorazepam (ATIVAN) 2 MG tablet Take  by mouth 4 times daily. 30 tablet 0     mirtazapine (REMERON) 30 MG tablet TK 1 T PO HS  3     ORDER FOR DME autoCPAP 8cm       ranitidine (ZANTAC) 150 MG tablet Take 1 tablet (150 mg) by mouth 2 times daily 60 tablet 0     hydrOXYzine (VISTARIL) 50 MG capsule Take 1 capsule (50 mg) by mouth 3 times daily as needed for itching (Patient not taking: Reported on 8/23/2018) 60 capsule 1     Allergies   Allergen Reactions     Nicotine  "Rash     Nicotine Patches     Pertussis Toxoid      Bad reaction as a child       Reviewed and updated as needed this visit by clinical staff       Reviewed and updated as needed this visit by Provider         ROS:  Constitutional, HEENT, cardiovascular, pulmonary, GI, , musculoskeletal, neuro, skin, endocrine and psych systems are negative, except as otherwise noted.    OBJECTIVE:     /83 (BP Location: Right arm, Patient Position: Sitting, Cuff Size: Adult Large)  Pulse 108  Temp 98.5  F (36.9  C) (Oral)  Resp 24  Ht 6' 3\" (1.905 m)  Wt 272 lb 3.2 oz (123.5 kg)  SpO2 97%  BMI 34.02 kg/m2  Body mass index is 34.02 kg/(m^2).  GENERAL: healthy, alert and no distress  NECK: no adenopathy, no asymmetry, masses, or scars and thyroid normal to palpation  RESP: lungs clear to auscultation - no rales, rhonchi or wheezes  CV: regular rate and rhythm, normal S1 S2, no S3 or S4, no murmur, click or rub, no peripheral edema and peripheral pulses strong  ABDOMEN: soft, nontender, no hepatosplenomegaly, no masses and bowel sounds normal  MS: no gross musculoskeletal defects noted, no edema  PSYCH: anxious, pacing  Diagnostic Test Results:  none     ASSESSMENT/PLAN:       ICD-10-CM    1. Schizoaffective disorder, unspecified type (H) F25.9    2. Gastroesophageal reflux disease, esophagitis presence not specified K21.9 H Pylori antigen, stool     Patient was reassured that Zantac would be safe for him to take since he has no preexisting cardiac disease.   Zantac is antiacid medication that has mild SE and will help with his GERD symptom and consequently his anxiety because GERD has been triggering panic attacks for him.   Patient will take zantac 300 mg at bedtime since he gets most symptoms at night and early morning.   Patient was advised to do h.pylori test.   Patient reported understanding and that after our conversation he feels better and safer to take Zantac.   Follow up in 1 month  or sooner as needed "   Marina Khanna PA-C  [facility]

## 2018-08-24 NOTE — TELEPHONE ENCOUNTER
Routing to provider to review and approve request. Patient was prescribed Ranitidine 150 mg BID on 8/23/18. Was given #60 with 0 refills. Patient is asking for a 90 day supply (#180). RN unable to change prescription since was originally prescribed a 1 mo supply and no extra refills were given.     Katty Ricci RN  [facility] Triage

## 2018-08-25 ASSESSMENT — ANXIETY QUESTIONNAIRES: GAD7 TOTAL SCORE: 21

## 2018-08-25 ASSESSMENT — PATIENT HEALTH QUESTIONNAIRE - PHQ9: SUM OF ALL RESPONSES TO PHQ QUESTIONS 1-9: 17

## 2018-08-25 NOTE — TELEPHONE ENCOUNTER
Patient would need to see his Primary for further meds. This was given in Urgent Care and he should follow up with Primary if symptoms do not improve.    Jaqueline Quach PA-C

## 2018-10-19 ENCOUNTER — TELEPHONE (OUTPATIENT)
Dept: FAMILY MEDICINE | Facility: CLINIC | Age: 38
End: 2018-10-19

## 2018-10-19 ENCOUNTER — OFFICE VISIT (OUTPATIENT)
Dept: URGENT CARE | Facility: URGENT CARE | Age: 38
End: 2018-10-19
Payer: MEDICARE

## 2018-10-19 VITALS
DIASTOLIC BLOOD PRESSURE: 75 MMHG | HEART RATE: 94 BPM | TEMPERATURE: 98.3 F | WEIGHT: 293 LBS | SYSTOLIC BLOOD PRESSURE: 137 MMHG | BODY MASS INDEX: 36.62 KG/M2 | RESPIRATION RATE: 18 BRPM | OXYGEN SATURATION: 95 %

## 2018-10-19 DIAGNOSIS — K21.00 GASTROESOPHAGEAL REFLUX DISEASE WITH ESOPHAGITIS: ICD-10-CM

## 2018-10-19 DIAGNOSIS — K21.00 GASTROESOPHAGEAL REFLUX DISEASE WITH ESOPHAGITIS: Primary | ICD-10-CM

## 2018-10-19 PROCEDURE — 99213 OFFICE O/P EST LOW 20 MIN: CPT | Performed by: PHYSICIAN ASSISTANT

## 2018-10-19 NOTE — MR AVS SNAPSHOT
After Visit Summary   10/19/2018    Rafa Cochran    MRN: [id number]           Patient Information     Date Of Birth          1980        Visit Information        Provider Department      10/19/2018 5:15 PM Jaqueline Quach PA-C [facility]        Today's Diagnoses     Gastroesophageal reflux disease with esophagitis    -  1       Follow-ups after your visit        Who to contact     If you have questions or need follow up information about today's clinic visit or your schedule please contact [facility] directly at [phone].  Normal or non-critical lab and imaging results will be communicated to you by Canpageshart, letter or phone within 4 business days after the clinic has received the results. If you do not hear from us within 7 days, please contact the clinic through [employer] Radian6t or phone. If you have a critical or abnormal lab result, we will notify you by phone as soon as possible.  Submit refill requests through Smallable or call your pharmacy and they will forward the refill request to us. Please allow 3 business days for your refill to be completed.          Additional Information About Your Visit        MyChart Information     Smallable gives you secure access to your electronic health record. If you see a primary care provider, you can also send messages to your care team and make appointments. If you have questions, please call your primary care clinic.  If you do not have a primary care provider, please call [phone] and they will assist you.        Care EveryWhere ID     This is your Care EveryWhere ID. This could be used by other organizations to access your [facility] medical records  [id number]        Your Vitals Were     Pulse Temperature Respirations Pulse Oximetry BMI (Body Mass Index)       94 98.3  F (36.8  C) (Oral) 18 95% 36.62 kg/m2        Blood Pressure from Last 3 Encounters:   10/19/18 137/75   08/24/18 125/83   08/23/18 133/88     Weight from Last 3 Encounters:   10/19/18 293 lb (132.9 kg)   08/24/18 272 lb 3.2 oz (123.5 kg)   07/06/18 277 lb (125.6 kg)              Today, you had the following     No orders found for display         Today's Medication Changes          These changes are accurate as of 10/19/18  6:27 PM.  If you have any questions, ask your nurse or doctor.               These medicines have changed or have updated prescriptions.        Dose/Directions    * ranitidine 150 MG tablet   Commonly known as:  ZANTAC   This may have changed:  Another medication with the same name was added. Make sure you understand how and when to take each.   Changed by:  Jaqueline Quach PA-C        Refills:  0       * ranitidine 150 MG tablet   Commonly known as:  ZANTAC   This may have changed:  You were already taking a medication with the same name, and this prescription was added. Make sure you understand how and when to take each.   Used for:  Gastroesophageal reflux disease with esophagitis   Changed by:  Jaqueline Quach PA-C        Dose:  150 mg   Take 1 tablet (150 mg) by mouth At Bedtime   Quantity:  60 tablet   Refills:  0       * Notice:  This list has 2 medication(s) that are the same as other medications prescribed for you. Read the directions carefully, and ask your doctor or other care provider to review them with you.         Where to get your medicines      These medications were sent to [facility] [address] [place] [address] [address] AT [facility] [place] [address] [address], [facility] [address]     Phone:  [phone]     ranitidine 150 MG tablet                Primary Care Provider Office Phone # Fax #    [facility] [phone] [address] [facility] [address]        Equal Access to Services     MENG ALTAMIRANO AH: Arthur Sharma, gela leigh, amelia sandoval, elisabeth helms. So [facility]  [phone].    ATENCIÓN: Si loretta turner, tiene a marin disposición servicios gratuitos de asistencia lingüística. Randa nogueira [phone].    We comply with applicable federal civil rights laws and [place] laws. We do not discriminate on the basis of race, color, national origin, age, disability, sex, sexual orientation, or gender identity.            Thank you!     Thank you for choosing [facility]  for your care. Our goal is always to provide you with excellent care. Hearing back from our patients is one way we can continue to improve our services. Please take a few minutes to complete the written survey that you may receive in the mail after your visit with us. Thank you!             Your Updated Medication List - Protect others around you: Learn how to safely use, store and throw away your medicines at www.disposemymeds.org.          This list is accurate as of 10/19/18  6:27 PM.  Always use your most recent med list.                   Brand Name Dispense Instructions for use Diagnosis    hydrOXYzine 50 MG capsule    VISTARIL    60 capsule    Take 1 capsule (50 mg) by mouth 3 times daily as needed for itching    Panic attack       LORazepam 2 MG tablet    ATIVAN    30 tablet    Take  by mouth 4 times daily.        mirtazapine 30 MG tablet    REMERON     TK 1 T PO HS        order for DME      autoCPAP 8cm        * ranitidine 150 MG tablet    ZANTAC          * ranitidine 150 MG tablet    ZANTAC    60 tablet    Take 1 tablet (150 mg) by mouth At Bedtime    Gastroesophageal reflux disease with esophagitis       * Notice:  This list has 2 medication(s) that are the same as other medications prescribed for you. Read the directions carefully, and ask your doctor or other care provider to review them with you.

## 2018-10-19 NOTE — PROGRESS NOTES
S: 38-year-old male with history of schizophrenia presents for evaluation of exacerbation of his GERD.  He states in the past he had ulcerative esophagitis.  He was treated and repeat endoscope 6 years ago showed that it had all healed.  He has noted flareup of his acid reflux since August of this year.  He has been using ranitidine but ran out last night.  He has been on Prilosec in the past but he refuses it to take it as it lists all sorts of side effects that make him fearful.  He denies any vomiting.  No fever.          Allergies   Allergen Reactions     Nicotine Rash     Nicotine Patches     Pertussis Toxoid      Bad reaction as a child       Past Medical History:   Diagnosis Date     Esparza esophagus 2/4/2011     History of gastric ulcer 5/6/2013     Ulcer          Current Outpatient Prescriptions on File Prior to Visit:  lorazepam (ATIVAN) 2 MG tablet Take  by mouth 4 times daily.   mirtazapine (REMERON) 30 MG tablet TK 1 T PO HS   ORDER FOR DME autoCPAP 8cm   hydrOXYzine (VISTARIL) 50 MG capsule Take 1 capsule (50 mg) by mouth 3 times daily as needed for itching (Patient not taking: Reported on 8/23/2018)     No current facility-administered medications on file prior to visit.     Social History   Substance Use Topics     Smoking status: Former Smoker     Quit date: 12/25/2013     Smokeless tobacco: Never Used      Comment: 5 cigs per day     Alcohol use 0.0 oz/week     0 Standard drinks or equivalent per week      Comment: rare use       ROS:  General: negative for fever  ABD: as above  : negative for dysuria  Neurologic:negative for Headache    OBJECTIVE:  /75 (BP Location: Left arm, Patient Position: Chair, Cuff Size: Adult Large)  Pulse 94  Temp 98.3  F (36.8  C) (Oral)  Resp 18  Wt 293 lb (132.9 kg)  SpO2 95%  BMI 36.62 kg/m2   General:   awake, alert, and cooperative.  NAD.   Head: Normocephalic, atraumatic.  Eyes: Conjunctiva clear, non icteric.   Heart: Regular rate and rhythm. No  murmur.  Lungs: Chest is clear; no wheezes or rales.  ABD: soft, mild epigastric tenderness to palpation , no rigidity, guarding or rebound , bowel sounds intact  Neuro: Alert and oriented - normal speech.     ASSESSMENT:well appearing    ICD-10-CM    1. Gastroesophageal reflux disease with esophagitis K21.0 ranitidine (ZANTAC) 150 MG tablet       PLAN: Refuses to try Prilosec.  Wants prescription for Zantac.  Follow-up with primary in 1-2 weeks.       Advised about symptoms which might herald more serious problems.        Jaqueline Quach PA-C

## 2018-10-19 NOTE — TELEPHONE ENCOUNTER
Reason for Call:  Medication or medication refill:    Do you use a [facility]?  Name of the pharmacy and phone number for the current request:  [facility] [address] [place] [address] [address] AT [facility]    Name of the medication requested: Ranitidine 150 mg tabs BID     Other request: Please call when approved.    Can we leave a detailed message on this number? YES    Phone number patient can be reached at: Cell number on file:    Telephone Information:   Mobile [phone]     Best Time: any    Call taken on 10/19/2018 at 4:22 PM by Lisa Kaur

## 2018-10-19 NOTE — TELEPHONE ENCOUNTER
Did not see that ranitidine was active on patients medication list. Upon chart review it showed that this medication had an end date of 9/22/18. Will route to provider since patient requesting refill.    RN did triage. Patient has following symptoms:  Stomach pain from the acid in stomach,  burning his throat and in chest, did not sleep well last night, nausea, constipation weakness, bloated and decreased appetite.    RN advised that patient should be seen in urgent care tonight before things worsen. He stated he would go there now.  Jyothi Avendano RN      Routing to provider that seen patient last for review.

## 2019-01-08 ENCOUNTER — TELEPHONE (OUTPATIENT)
Dept: FAMILY MEDICINE | Facility: CLINIC | Age: 39
End: 2019-01-08

## 2019-01-08 NOTE — TELEPHONE ENCOUNTER
Called spoke with patient informed needs to see PCP for follow up. Informed patient we can get his results at his appointment.    Some how I was disconnected from patient so if patient call back please schedule appointment.    PABLO Morrison MA

## 2019-01-08 NOTE — TELEPHONE ENCOUNTER
Reason for Call:  Other appointment    Detailed comments: Pt just had a stress test at [facility] done and was asked to setup an apt with her Primary Provider.  He is not sure if his PCP cannot get results of stress test from [facility] or if he needs to make a follow up apt with his PCP.    Phone Number Patient can be reached at: Cell number on file:    Telephone Information:   Mobile [phone]       Best Time: anytime    Can we leave a detailed message on this number? YES    Call taken on 1/8/2019 at 1:13 PM by Rodrigo Bradford

## 2019-01-10 ENCOUNTER — OFFICE VISIT (OUTPATIENT)
Dept: FAMILY MEDICINE | Facility: CLINIC | Age: 39
End: 2019-01-10
Payer: MEDICARE

## 2019-01-10 VITALS
WEIGHT: 277.8 LBS | DIASTOLIC BLOOD PRESSURE: 87 MMHG | HEART RATE: 97 BPM | TEMPERATURE: 97.3 F | RESPIRATION RATE: 18 BRPM | HEIGHT: 73 IN | OXYGEN SATURATION: 96 % | SYSTOLIC BLOOD PRESSURE: 136 MMHG | BODY MASS INDEX: 36.82 KG/M2

## 2019-01-10 DIAGNOSIS — K21.00 GASTROESOPHAGEAL REFLUX DISEASE WITH ESOPHAGITIS: ICD-10-CM

## 2019-01-10 DIAGNOSIS — R94.30 EJECTION FRACTION < 50%: Primary | ICD-10-CM

## 2019-01-10 DIAGNOSIS — K21.9 GASTROESOPHAGEAL REFLUX DISEASE, ESOPHAGITIS PRESENCE NOT SPECIFIED: ICD-10-CM

## 2019-01-10 DIAGNOSIS — Z23 NEED FOR PROPHYLACTIC VACCINATION AND INOCULATION AGAINST INFLUENZA: ICD-10-CM

## 2019-01-10 DIAGNOSIS — B35.3 TINEA PEDIS OF BOTH FEET: ICD-10-CM

## 2019-01-10 PROCEDURE — 87338 HPYLORI STOOL AG IA: CPT | Performed by: PHYSICIAN ASSISTANT

## 2019-01-10 PROCEDURE — 99214 OFFICE O/P EST MOD 30 MIN: CPT | Performed by: PHYSICIAN ASSISTANT

## 2019-01-10 RX ORDER — ECONAZOLE NITRATE 10 MG/G
CREAM TOPICAL DAILY
Qty: 85 G | Refills: 5 | Status: SHIPPED | OUTPATIENT
Start: 2019-01-10 | End: 2019-10-22

## 2019-01-10 ASSESSMENT — MIFFLIN-ST. JEOR: SCORE: 2236.03

## 2019-01-10 ASSESSMENT — PAIN SCALES - GENERAL: PAINLEVEL: NO PAIN (0)

## 2019-01-10 NOTE — PROGRESS NOTES
SUBJECTIVE:   Rafa Cochran is a 38 year old male who presents to clinic today for the following health issues:    Pt. Is here for follow up on stress test and Holter monitor results done at [facility] 19    Rash      Duration: 6 months    Description  Location: both feet  Itching: moderate    Intensity:  moderate    Accompanying signs and symptoms: None    History (similar episodes/previous evaluation): ongoing tinea pedis    Precipitating or alleviating factors:  New exposures:  None  Recent travel: no      Therapies tried and outcome: ketoconazole cream cause allergic reaction, Clotrimazole cream is not currently working     Patient requests Zantac refill. 150 mg twice a day work great.     Problem list and histories reviewed & adjusted, as indicated.  Additional history: as documented    Patient Active Problem List   Diagnosis     Major depression in partial remission (H)     Bipolar affective disorder (H)     OCD (obsessive compulsive disorder)     Schizoaffective disorder (H)     Sinus tachycardia     Palpitations     Hyperlipidemia LDL goal <160     Hypertriglyceridemia     Prediabetes     Panic attack     BABATUNDE (obstructive sleep apnea)- moderate (AHI 17)     Pain in joint of right shoulder     Sprain and strain of shoulder and upper arm, left, initial encounter     Shoulder pain, left     Hyperglycemia     Morbid obesity due to GERD (H)     Gastroesophageal reflux disease, esophagitis presence not specified     Family history of cardiac arrhythmia     Past Surgical History:   Procedure Laterality Date     APPENDECTOMY         Social History     Tobacco Use     Smoking status: Former Smoker     Last attempt to quit: 2013     Years since quittin.0     Smokeless tobacco: Never Used     Tobacco comment: 5 cigs per day   Substance Use Topics     Alcohol use: Yes     Alcohol/week: 0.0 oz     Comment: rare use     Family History   Problem Relation Age of Onset     C.A.D. Maternal Grandfather       Diabetes Maternal Grandfather      Hypertension Maternal Grandfather      Heart Disease Maternal Grandfather      C.A.D. Paternal Grandmother      Diabetes Paternal Grandmother      Circulatory Paternal Grandmother      Gastrointestinal Disease Paternal Grandmother      C.A.D. Paternal Grandfather      Hypertension Paternal Grandfather      Circulatory Paternal Grandfather      Heart Disease Paternal Grandfather      Gastrointestinal Disease Paternal Grandfather      Hypertension Father      Alcohol/Drug Father      Allergies Father      Depression Father      Gastrointestinal Disease Father      Prostate Cancer Father      Cerebrovascular Disease Maternal Grandmother      Arthritis Maternal Grandmother      Heart Disease Maternal Grandmother      Alcohol/Drug Mother      Allergies Mother      Circulatory Mother      Depression Mother      Heart Disease Mother      Diabetes Mother      Alcohol/Drug Sister      Allergies Sister      Depression Sister      Genitourinary Problems Sister          Current Outpatient Medications   Medication Sig Dispense Refill     econazole nitrate 1 % external cream Apply topically daily 85 g 5     lorazepam (ATIVAN) 2 MG tablet Take  by mouth 4 times daily. 30 tablet 0     mirtazapine (REMERON) 30 MG tablet TK 1 T PO HS  3     ORDER FOR DME autoCPAP 8cm       ranitidine (ZANTAC) 150 MG tablet Take 1 tablet (150 mg) by mouth 2 times daily 180 tablet 1     ranitidine (ZANTAC) 150 MG tablet   0     hydrOXYzine (VISTARIL) 50 MG capsule Take 1 capsule (50 mg) by mouth 3 times daily as needed for itching (Patient not taking: Reported on 1/10/2019) 60 capsule 1     Allergies   Allergen Reactions     Nicotine Rash     Nicotine Patches     Pertussis Toxoid      Bad reaction as a child       Reviewed and updated as needed this visit by clinical staff  Tobacco  Allergies  Meds  Med Hx  Surg Hx  Fam Hx  Soc Hx      Reviewed and updated as needed this visit by Provider      "    ROS:  Constitutional, HEENT, cardiovascular, pulmonary, gi and gu systems are negative, except as otherwise noted.    OBJECTIVE:     /87 (BP Location: Right arm, Patient Position: Sitting, Cuff Size: Adult Large)   Pulse 97   Temp 97.3  F (36.3  C) (Oral)   Resp 18   Ht 1.842 m (6' 0.5\")   Wt 126 kg (277 lb 12.8 oz)   SpO2 96%   BMI 37.16 kg/m    Body mass index is 37.16 kg/m .  GENERAL: healthy, alert and no distress  EYES: Eyes grossly normal to inspection,  conjunctivae and sclerae normal  HENT: normal cephalic/atraumatic, face is symmetrical,   RESP: no difficulty breathing, no use of accessory muscles observed.   CV: no peripheral edema and color normal, no parasternal heaves visualized.   MS: no gross musculoskeletal defects noted, no edema  SKIN:erytheamtous dry flaky skin of the plantar feet and between toes   NEURO: Normal strength and tone, mentation intact and speech normal  PSYCH: mentation appears normal, affect normal/bright      Diagnostic Test Results:  none     ASSESSMENT/PLAN:       ICD-10-CM    1. Ejection fraction < 50% R94.30 CARDIOLOGY EVAL ADULT REFERRAL   2. Tinea pedis of both feet B35.3 econazole nitrate 1 % external cream     DERMATOLOGY REFERRAL   3. Gastroesophageal reflux disease with esophagitis K21.0 ranitidine (ZANTAC) 150 MG tablet   4. Need for prophylactic vaccination and inoculation against influenza Z23      1. Stress echo was essentially normal with exception for EF 45-50%   Holter results are not available.   Patient will see cardiology to discuss Holter results and how often he silva to repeat echo due to reduced EF>    2. Econazole cram apply daily   Wash feet with soap and water dry thoroughly then apply cream do not wear the same pair of shoes daily have a change   If not better in a few week see dermatology     3. Continue Zantac 150 mg twice a day   Marina Khanna PA-C  [facility]  "

## 2019-01-11 ENCOUNTER — TELEPHONE (OUTPATIENT)
Dept: FAMILY MEDICINE | Facility: CLINIC | Age: 39
End: 2019-01-11

## 2019-01-11 DIAGNOSIS — B35.3 TINEA PEDIS OF BOTH FEET: Primary | ICD-10-CM

## 2019-01-11 LAB
H PYLORI AG STL QL IA: NORMAL
SPECIMEN SOURCE: NORMAL

## 2019-01-11 NOTE — TELEPHONE ENCOUNTER
Plan does not cover econazole nitrate 1 % external cream.  Please call [phone] to initiate Prior Auth or change med.    Insurance type and ID number: ID# [id number]      Additional Information:     Mary Pedraza

## 2019-01-12 NOTE — TELEPHONE ENCOUNTER
Please start PA. Patient has tried Ketoconazole and has allergy, Clotrimazole is not effective.     Serena Khanna PAC

## 2019-01-16 NOTE — TELEPHONE ENCOUNTER
PRIOR AUTHORIZATION DENIED    Medication: econazole nitrate 1 % external cream-DENIED    Denial Date: 1/16/2019    Denial Rational: Non-formulary medication.  Patient needs to try/fail ciclopirox topical cream.        Appeal Information:     If provider would like to appeal please provide a letter of medical necessity stating why formulary alternatives would not be clinically appropriate for patient and route back to the PA team.

## 2019-01-16 NOTE — TELEPHONE ENCOUNTER
Central Prior Authorization Team   Phone: [phone]      PA Initiation    Medication: econazole nitrate 1 % external cream-Initiated  Insurance Company: [employer] - Phone [phone] Fax [phone]  Pharmacy Filling the Rx: [facility] [address] [place] [address] [address] AT [facility]  Filling Pharmacy Phone: [phone]  Filling Pharmacy Fax:    Start Date: 1/16/2019

## 2019-01-21 NOTE — TELEPHONE ENCOUNTER
Medication Appeal Initiation    We have initiated an appeal for the requested medication:  Medication: econazole nitrate 1 % external cream-APPEAL INITIATED  Appeal Start Date:  1/21/2019  Insurance Company: [employer] - Phone [phone] Fax [phone]  Comments:       Sent in redetermination to [employer], manually faxed forms to [phone].

## 2019-01-21 NOTE — TELEPHONE ENCOUNTER
Patient has already tried clotrimazole for 8 weeks without improvement. Patient is allergic to Ketoconazole. Next step is econazole. Please restart MARY Khanna PAC

## 2019-01-23 NOTE — TELEPHONE ENCOUNTER
MEDICATION APPEAL DENIED    Medication: econazole nitrate 1 % external cream-APPEAL DENIED    Denial Date: 1/23/2019    Denial Rational: Patient needs to try/fail ciclopirox        Second Level Appeal Information:     Second level appeals will be managed by the clinic staff and provider. Please contact the [employer] Prior Authorization Team if additional information about the denial is needed.

## 2019-01-24 ENCOUNTER — OFFICE VISIT (OUTPATIENT)
Dept: CARDIOLOGY | Facility: CLINIC | Age: 39
End: 2019-01-24
Payer: MEDICARE

## 2019-01-24 VITALS
WEIGHT: 282.2 LBS | DIASTOLIC BLOOD PRESSURE: 82 MMHG | OXYGEN SATURATION: 96 % | HEART RATE: 99 BPM | BODY MASS INDEX: 37.75 KG/M2 | SYSTOLIC BLOOD PRESSURE: 136 MMHG

## 2019-01-24 DIAGNOSIS — R05.3 CHRONIC COUGH: ICD-10-CM

## 2019-01-24 DIAGNOSIS — R00.0 SINUS TACHYCARDIA: ICD-10-CM

## 2019-01-24 DIAGNOSIS — R06.09 EXERTIONAL DYSPNEA: ICD-10-CM

## 2019-01-24 DIAGNOSIS — R93.1 ABNORMAL ECHOCARDIOGRAM: Primary | ICD-10-CM

## 2019-01-24 PROCEDURE — 99204 OFFICE O/P NEW MOD 45 MIN: CPT | Performed by: INTERNAL MEDICINE

## 2019-01-24 RX ORDER — PRENATAL VIT 91/IRON/FOLIC/DHA 28-975-200
COMBINATION PACKAGE (EA) ORAL 2 TIMES DAILY
Qty: 30 G | Refills: 11 | Status: SHIPPED | OUTPATIENT
Start: 2019-01-24 | End: 2019-11-11

## 2019-01-24 ASSESSMENT — PAIN SCALES - GENERAL: PAINLEVEL: NO PAIN (1)

## 2019-01-24 NOTE — PROGRESS NOTES
"Chief complaint: dyspnea, abnormal echocardiogram    HPI:   Rafa Cochran is a 38 year old male referred for evaluation of dyspnea and abnormal echocardiogram.     Felt weak and tired over the past 9 months; he has also been under more stress. He had been walking ~10 miles per day the summer before last, and last summer could barely walk 3 miles per day. He has had several episodes of shortness of breath which resulted in ED visits.  He has gained significant weight over that time.    He describes a constellation of multiple symptoms including generalized malaise, numbness in his hands and feet, anxiety, chronic dry cough, sensation that he cannot breath (feels that his lungs are \"locked\" but not short of breath per se); these symptoms occur mostly when he is at rest, particularly during stressful times. There is no clear relationship to activity. All of these symptoms have been occurring for ~1.5 years.     He underwent stress echocardiogram at [facility] ([facility]) which showed baseline sinus tachycardia  and mildly reduced resting LV function, LVEF=45-50%. He achieved 7 METs and 98% MPHR after 5 minutes 15 seconds of exercise.      He denies any chest pain, PND, orthopnea, peripheral edema, palpitations, lightheadedness or syncope.       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 30 tablet 0     mirtazapine (REMERON) 30 MG tablet TK 1 T PO HS  3     ORDER FOR DME autoCPAP 8cm       ranitidine (ZANTAC) 150 MG tablet Take 1 tablet (150 mg) by mouth 2 times daily 180 tablet 1     ranitidine (ZANTAC) 150 MG tablet   0     hydrOXYzine (VISTARIL) 50 MG capsule Take 1 capsule (50 mg) " by mouth 3 times daily as needed for itching (Patient not taking: Reported on 2019) 60 capsule 1       PAST SURGICAL HISTORY:  Past Surgical History:   Procedure Laterality Date     APPENDECTOMY         ALLERGIES:     Allergies   Allergen Reactions     Nicotine Rash     Nicotine Patches     Pertussis Toxoid      Bad reaction as a child       FAMILY HISTORY:  Reviewed, of note:  Sister with atrial fibrillation (diagnosed age 21-22)  Mother with mitral valve prolapse and had MVR, had some arrhythmia (not sure what type) which resolved after surgery  Maternal grandmother had rheumatic heart disease, and  in her 50s of arrhythmia   Paternal grandfather had HF, heavy smoker.  in his 80s.  Maternal grandfather had CAD.  in his 80s.    SOCIAL HISTORY:  Social History     Tobacco Use     Smoking status: Former Smoker     Last attempt to quit: 2013     Years since quittin.0     Smokeless tobacco: Never Used     Tobacco comment: 5 cigs per day   Substance Use Topics     Alcohol use: Yes     Alcohol/week: 0.0 oz     Comment: rare use     Drug use: No       ROS:   A comprehensive 14 point review of systems is negative other than as mentioned in HPI.    Exam:  /82 (BP Location: Left arm, Patient Position: Chair, Cuff Size: Adult Large)   Pulse 99   Wt 128 kg (282 lb 3.2 oz)   SpO2 96%   BMI 37.75 kg/m    GENERAL APPEARANCE: healthy, alert and no distress  EYES: no icterus, no xanthelasmas  ENT: normal palate, mucosa moist, no central cyanosis  NECK: JVP not elevated  RESPIRATORY: lungs clear to auscultation - no rales, rhonchi or wheezes, no use of accessory muscles, no retractions, respirations are unlabored, normal respiratory rate  CARDIOVASCULAR: regular rhythm, normal S1 with physiologic split S2, no S3 or S4 and no murmur, click or rub.  GI: soft, non tender, bowel sounds normal,no abdominal bruits  EXTREMITIES: no edema, no bruits  NEURO: alert and oriented to person/place/time, normal  speech, gait and affect  VASC: Radial, dorsalis pedis and posterior tibialis pulses 2+ bilaterally.  SKIN: no ecchymoses, no rashes.  PSYCH: cooperative, affect appropriate.     Labs:  Reviewed.       Testing/Procedures:    Outside records from [facility] were obtained, and relevant results/notes have been incorporated into HPI.    Outside results of note:  Other Result Information  Exercise Echocardiogram ([facility]):  baseline sinus tachycardia  and mildly reduced resting LV function, LVEF=45-50%. He achieved 7 METs and 98% MPHR after 5 minutes 15 seconds of exercise.    No angina elicited.  No significant valvular abnormalities.     ZioPatch 12/7/18-12/20/18 ([facility]):  Patient had a min HR of 47 bpm, max HR of 155 bpm, and avg HR of 87 bpm.   Predominant underlying rhythm was Sinus Rhythm.   Isolated SVEs were rare (<1.0%), and no SVE Couplets or SVE Triplets were present.  Isolated VEs were rare (<1.0%), VE Couplets were rare (<1.0%), and no VE Triplets were present.  Patient events for shortness of breath, lightheaded, anxious, chest pain/pressure/tingling in neck or arm correlate to sinus rhythm, sinus tachycardia.  Rates range 90 - 130 bpm.  Approximately half of the events correlate to with normal heart rate.  One patient event for skipped beat correlated to sinus rhythm 91 bpm  CONCLUSIONS  1. The predominant underlying rhythm was sinus  2. No significant arrhthmias observed during this study    Assessment and Plan:   1. Abnormal echocardiogram  2. Possible cardiomyopathy  3. Exertional dyspnea  4. Chronic cough  With new diagnosis of possible cardiomyopathy of unclear etiology, we discussed that further evaluation to definitively confirm reduction in LVEF and determine its etiology if present is warranted. We did also discuss that even if present, this degree of mild cardiomyopathy would be unlikely to account for his symptoms of cough and cough and dyspnea (especially as he is clinically  euvolemic.)  Plan:  -cardiac MRI  -PFTs  -follow up TBD based on MRI results    The patient states understanding and is agreeable with plan.     Jordon Benítez MD  Cardiology    CC  [facility]

## 2019-01-24 NOTE — NURSING NOTE
Rafa Cochran's goals for this visit include: New  He requests these members of his care team be copied on today's visit information: yes    PCP: Marina Khanna    Referring Provider:  Marina Khanna PA-C  [address] [place] [address]    /82 (BP Location: Left arm, Patient Position: Chair, Cuff Size: Adult Large)   Pulse 99   Wt 128 kg (282 lb 3.2 oz)   SpO2 96%   BMI 37.75 kg/m      Do you need any medication refills at today's visit? No    Angella Foreman CMA on 1/24/2019 at 3:12 PM

## 2019-01-24 NOTE — TELEPHONE ENCOUNTER
Please call patient and notify I have sent prescription for another cream called  Terbinafine cream to his pharmacy, hopefully insurance covers that, if not then he may purchase Terbinafine over the counter himself.     Serena Khanna PAC

## 2019-01-24 NOTE — PATIENT INSTRUCTIONS
1. Cardiac MRI at the [facility]   2. Lung function testing   3. Follow up to be determined based on results of MRI.

## 2019-01-25 NOTE — TELEPHONE ENCOUNTER
This writer attempted to contact patient  on 01/24/19      Reason for call PA appeal denied, alternative sent and left message.      If patient calls back:   Patient contacted by 1st floor [facility] Care Team (MA/TC). Inform patient that someone from the team will contact them, document that pt called and route to care team.         Julissa Wheeler MA

## 2019-01-25 NOTE — TELEPHONE ENCOUNTER
This writer attempted to contact patient  on 01/25/19        Reason for call PA appeal denied, alternative sent and left detailed message.        If patient calls back:              Patient contacted by 1st floor [place] [facility] Care Team (MA/TC). Inform patient that someone from the team will contact them, document that pt called and route to care team.            Reji Ferris CMA

## 2019-01-29 DIAGNOSIS — R93.1 ABNORMAL ECHOCARDIOGRAM: ICD-10-CM

## 2019-01-29 DIAGNOSIS — R06.09 EXERTIONAL DYSPNEA: ICD-10-CM

## 2019-01-29 PROCEDURE — 93000 ELECTROCARDIOGRAM COMPLETE: CPT | Performed by: INTERNAL MEDICINE

## 2019-03-12 ENCOUNTER — OFFICE VISIT (OUTPATIENT)
Dept: FAMILY MEDICINE | Facility: CLINIC | Age: 39
End: 2019-03-12
Payer: MEDICARE

## 2019-03-12 VITALS
DIASTOLIC BLOOD PRESSURE: 80 MMHG | BODY MASS INDEX: 37.43 KG/M2 | OXYGEN SATURATION: 98 % | HEART RATE: 87 BPM | HEIGHT: 73 IN | SYSTOLIC BLOOD PRESSURE: 138 MMHG | RESPIRATION RATE: 20 BRPM | WEIGHT: 282.4 LBS | TEMPERATURE: 98.3 F

## 2019-03-12 DIAGNOSIS — R10.9 FLANK PAIN: ICD-10-CM

## 2019-03-12 DIAGNOSIS — R10.84 ABDOMINAL PAIN, GENERALIZED: ICD-10-CM

## 2019-03-12 DIAGNOSIS — Z80.42 FAMILY HISTORY OF PROSTATE CANCER IN FATHER: ICD-10-CM

## 2019-03-12 DIAGNOSIS — K21.9 GASTROESOPHAGEAL REFLUX DISEASE, ESOPHAGITIS PRESENCE NOT SPECIFIED: ICD-10-CM

## 2019-03-12 DIAGNOSIS — K22.10 ULCERATIVE ESOPHAGITIS: ICD-10-CM

## 2019-03-12 DIAGNOSIS — Z12.5 ENCOUNTER FOR SCREENING FOR MALIGNANT NEOPLASM OF PROSTATE: ICD-10-CM

## 2019-03-12 DIAGNOSIS — R30.0 DYSURIA: Primary | ICD-10-CM

## 2019-03-12 DIAGNOSIS — R03.0 ELEVATED BLOOD PRESSURE READING WITHOUT DIAGNOSIS OF HYPERTENSION: ICD-10-CM

## 2019-03-12 LAB
ALBUMIN UR-MCNC: NEGATIVE MG/DL
APPEARANCE UR: CLEAR
BILIRUB UR QL STRIP: NEGATIVE
COLOR UR AUTO: YELLOW
ERYTHROCYTE [DISTWIDTH] IN BLOOD BY AUTOMATED COUNT: 13.4 % (ref 10–15)
GLUCOSE UR STRIP-MCNC: NEGATIVE MG/DL
HCT VFR BLD AUTO: 43.3 % (ref 40–53)
HGB BLD-MCNC: 15.2 G/DL (ref 13.3–17.7)
HGB UR QL STRIP: NEGATIVE
KETONES UR STRIP-MCNC: NEGATIVE MG/DL
LEUKOCYTE ESTERASE UR QL STRIP: NEGATIVE
MCH RBC QN AUTO: 29.7 PG (ref 26.5–33)
MCHC RBC AUTO-ENTMCNC: 35.1 G/DL (ref 31.5–36.5)
MCV RBC AUTO: 85 FL (ref 78–100)
NITRATE UR QL: NEGATIVE
PH UR STRIP: 5.5 PH (ref 5–7)
PLATELET # BLD AUTO: 306 10E9/L (ref 150–450)
RBC # BLD AUTO: 5.11 10E12/L (ref 4.4–5.9)
SOURCE: NORMAL
SP GR UR STRIP: 1.01 (ref 1–1.03)
UROBILINOGEN UR STRIP-ACNC: 0.2 EU/DL (ref 0.2–1)
WBC # BLD AUTO: 7.7 10E9/L (ref 4–11)

## 2019-03-12 PROCEDURE — 87086 URINE CULTURE/COLONY COUNT: CPT | Performed by: NURSE PRACTITIONER

## 2019-03-12 PROCEDURE — 36415 COLL VENOUS BLD VENIPUNCTURE: CPT | Performed by: NURSE PRACTITIONER

## 2019-03-12 PROCEDURE — 85027 COMPLETE CBC AUTOMATED: CPT | Performed by: NURSE PRACTITIONER

## 2019-03-12 PROCEDURE — 80048 BASIC METABOLIC PNL TOTAL CA: CPT | Performed by: NURSE PRACTITIONER

## 2019-03-12 PROCEDURE — G0103 PSA SCREENING: HCPCS | Performed by: NURSE PRACTITIONER

## 2019-03-12 PROCEDURE — 99214 OFFICE O/P EST MOD 30 MIN: CPT | Performed by: NURSE PRACTITIONER

## 2019-03-12 PROCEDURE — 81003 URINALYSIS AUTO W/O SCOPE: CPT | Performed by: NURSE PRACTITIONER

## 2019-03-12 ASSESSMENT — MIFFLIN-ST. JEOR: SCORE: 2251.9

## 2019-03-12 ASSESSMENT — PAIN SCALES - GENERAL: PAINLEVEL: NO PAIN (0)

## 2019-03-12 NOTE — PROGRESS NOTES
SUBJECTIVE:   Rafa Cochran is a 39 year old male who presents to clinic today for the following health issues:      Genitourinary - Male  Onset: This morning    Description:   Dysuria (painful urination): YES  Hematuria (blood in urine): no   Frequency: YES  Are you urinating at night : no   Hesitancy (delay in urine): YES  Retention (unable to empty): YES  Decrease in urinary flow: YES  Incontinence: no     Progression of Symptoms:  same    Accompanying Signs & Symptoms:  Fever: no   Back/Flank pain: YES- seems unrelated, has had in the past and not worsened  Urethral discharge: no   Testicle lumps/masses/pain: no   Nausea and/or vomiting: no   Abdominal pain: YES- pressure    History:   History of frequent UTI's: no   History of kidney stones: YES  History of hernias: no   Personal or Family history of Prostate problems: YES father had prostate cancer  Sexually active: no     Precipitating factors:   unsure    Alleviating factors:  nothing      Denies concerns for STDs.  Father had history of prostate cancer, he denies hematuria.  He reports his bowels are variable between looser and normal.  Denies blood in stool.  Has had history of kidney stones in the past.  He does not feel his back pain is related to his urine, as he has had it before and it does not seem to change with his painful urination.    Abdominal pain: Had EGD a few years ago. Also having mid-epigastric pain that wraps mid-abdomen, and down right abdomen. Zantac was helping but now doesn't seem to help much. Had hx of ulcerative espohagitis  States doesn't drink alcohol now. No further smoking. Is under a lot of stress with work/life/school. Never had colonoscopy before.     Dx age 10 with childhood schizophrenia. Is at 7 mg total daily for ativan. Taking remeron 30 mg at night.  Follows with a psychiatrist that he likes to trust.  They have worked on various different therapies to help.  He has tried CBD in the past.    Also having chest  pressure. Is unsure if related to when he is having extreme stress as that is when it occurs the most. Has cardiology he will see for cardiac MRI.  Noted his EF is slightly decreased.  Seems to be followed closely by cardiology    Elevated BP:   He notes over the past few months to year his blood pressure has slowly increasing.  We will follow-up on this in a few weeks      Problem list and histories reviewed & adjusted, as indicated.  Additional history: as documented    Patient Active Problem List   Diagnosis     Major depression in partial remission (H)     Bipolar affective disorder (H)     OCD (obsessive compulsive disorder)     Schizoaffective disorder (H)     Sinus tachycardia     Palpitations     Hyperlipidemia LDL goal <160     Hypertriglyceridemia     Prediabetes     Panic attack     BABATUNDE (obstructive sleep apnea)- moderate (AHI 17)     Pain in joint of right shoulder     Sprain and strain of shoulder and upper arm, left, initial encounter     Shoulder pain, left     Hyperglycemia     Morbid obesity due to GERD (H)     Gastroesophageal reflux disease, esophagitis presence not specified     Family history of cardiac arrhythmia     Past Surgical History:   Procedure Laterality Date     APPENDECTOMY         Social History     Tobacco Use     Smoking status: Former Smoker     Last attempt to quit: 2013     Years since quittin.2     Smokeless tobacco: Never Used     Tobacco comment: 5 cigs per day   Substance Use Topics     Alcohol use: Yes     Alcohol/week: 0.0 oz     Comment: rare use     Family History   Problem Relation Age of Onset     C.A.D. Maternal Grandfather      Diabetes Maternal Grandfather      Hypertension Maternal Grandfather      Heart Disease Maternal Grandfather      C.A.D. Paternal Grandmother      Diabetes Paternal Grandmother      Circulatory Paternal Grandmother      Gastrointestinal Disease Paternal Grandmother      Hypertension Paternal Grandfather      Circulatory Paternal  Grandfather      Heart Disease Paternal Grandfather      Gastrointestinal Disease Paternal Grandfather      Heart Failure Paternal Grandfather      Hypertension Father      Alcohol/Drug Father      Allergies Father      Depression Father      Gastrointestinal Disease Father      Prostate Cancer Father      Cerebrovascular Disease Maternal Grandmother      Arthritis Maternal Grandmother      Heart Disease Maternal Grandmother      Alcohol/Drug Mother      Allergies Mother      Circulatory Mother      Depression Mother      Heart Disease Mother      Diabetes Mother      Alcohol/Drug Sister      Allergies Sister      Depression Sister      Genitourinary Problems Sister          Current Outpatient Medications   Medication Sig Dispense Refill     econazole nitrate 1 % external cream Apply topically daily 85 g 5     hydrOXYzine (VISTARIL) 50 MG capsule Take 1 capsule (50 mg) by mouth 3 times daily as needed for itching 60 capsule 1     lorazepam (ATIVAN) 2 MG tablet Take  by mouth 4 times daily. 30 tablet 0     mirtazapine (REMERON) 30 MG tablet TK 1 T PO HS  3     ORDER FOR DME autoCPAP 8cm       ranitidine (ZANTAC) 150 MG tablet Take 1 tablet (150 mg) by mouth 2 times daily 180 tablet 1     terbinafine (LAMISIL) 1 % external cream Apply topically 2 times daily 30 g 11     Allergies   Allergen Reactions     Nicotine Rash     Nicotine Patches     Pertussis Toxoid      Bad reaction as a child       Reviewed and updated as needed this visit by clinical staff  Tobacco  Allergies  Meds  Problems  Med Hx  Surg Hx  Fam Hx  Soc Hx        Reviewed and updated as needed this visit by Provider  Tobacco  Allergies  Meds  Problems  Med Hx  Surg Hx  Fam Hx         ROS:  Constitutional, cardiovascular, pulmonary, gi-as above and gu-as above systems are negative, except as otherwise noted.    OBJECTIVE:     /80 (BP Location: Right arm, Patient Position: Sitting, Cuff Size: Adult Large)   Pulse 87   Temp 98.3  F  "(36.8  C) (Oral)   Resp 20   Ht 1.842 m (6' 0.5\")   Wt 128.1 kg (282 lb 6.4 oz)   SpO2 98%   BMI 37.77 kg/m    Body mass index is 37.77 kg/m .  GENERAL: healthy, alert and no distress  RESP: lungs clear to auscultation - no rales, rhonchi or wheezes  CV: regular rate and rhythm, normal S1 S2, no S3 or S4, no murmur, click or rub  ABDOMEN: soft, obese, slightly tender mid-epigastric, RLQ and LLQ, DEE hepatosplenomegaly or masses due to body habitus, and bowel sounds normal  MS: no gross musculoskeletal defects noted  Psych: flat affect, denies SI    Diagnostic Test Results:  Results for orders placed or performed in visit on 03/12/19 (from the past 24 hour(s))   UA reflex to Microscopic and Culture   Result Value Ref Range    Color Urine Yellow     Appearance Urine Clear     Glucose Urine Negative NEG^Negative mg/dL    Bilirubin Urine Negative NEG^Negative    Ketones Urine Negative NEG^Negative mg/dL    Specific Gravity Urine 1.010 1.003 - 1.035    Blood Urine Negative NEG^Negative    pH Urine 5.5 5.0 - 7.0 pH    Protein Albumin Urine Negative NEG^Negative mg/dL    Urobilinogen Urine 0.2 0.2 - 1.0 EU/dL    Nitrite Urine Negative NEG^Negative    Leukocyte Esterase Urine Negative NEG^Negative    Source Midstream Urine    CBC with platelets   Result Value Ref Range    WBC 7.7 4.0 - 11.0 10e9/L    RBC Count 5.11 4.4 - 5.9 10e12/L    Hemoglobin 15.2 13.3 - 17.7 g/dL    Hematocrit 43.3 40.0 - 53.0 %    MCV 85 78 - 100 fl    MCH 29.7 26.5 - 33.0 pg    MCHC 35.1 31.5 - 36.5 g/dL    RDW 13.4 10.0 - 15.0 %    Platelet Count 306 150 - 450 10e9/L       ASSESSMENT/PLAN:       1. Dysuria  Patient denies STD concerns.  Urine was negative for infection but will test for culture anyway.  Do not see a reason for attic at this point, monitor for now, check labs if things NEGATIVE, would recommend following up with urology.   - UA reflex to Microscopic and Culture  - CBC with platelets  - Basic metabolic panel  - Urine Culture " Aerobic Bacterial    2. Ulcerative esophagitis  History of this, has had significant abdominal pain and reflux in the past.  His heart has been worked up multiple times in the past few months, he has follow-up with cardiology in the next couple months.  So unlikely abdominal pain is related to that.   - GASTROENTEROLOGY ADULT REF CONSULT ONLY    3. Gastroesophageal reflux disease, esophagitis presence not specified  As above  - GASTROENTEROLOGY ADULT REF CONSULT ONLY    5. Abdominal pain, generalized  We chose to do a complete abdominal ultrasound, and this patient is still having significant abdominal pain, in addition to dysuria.  This way we will be able to look at everything including the kidneys  - US Abdomen Complete; Future    6. Flank pain  As above  - US Abdomen Complete; Future    7. Encounter for screening for malignant neoplasm of prostate / family history in father  Screening  - PSA, screen    8. :  Elevated blood pressure without diagnosis of hypertension  Follow-up with provider in about 4 weeks for blood pressure recheck    FUTURE APPOINTMENTS:       - Follow-up visit in patient verbalized understanding for all labs and plan above follow-up in 4 weeks to check blood pressure    This chart was documented by provider using a voice activated software called Dragon in addition to manual typing. There may be vocabulary errors or other grammatical errors due to this.       NAS Smith, NP-C  [facility]

## 2019-03-13 LAB
ANION GAP SERPL CALCULATED.3IONS-SCNC: 9 MMOL/L (ref 3–14)
BUN SERPL-MCNC: 14 MG/DL (ref 7–30)
CALCIUM SERPL-MCNC: 9.6 MG/DL (ref 8.5–10.1)
CHLORIDE SERPL-SCNC: 108 MMOL/L (ref 94–109)
CO2 SERPL-SCNC: 23 MMOL/L (ref 20–32)
CREAT SERPL-MCNC: 0.82 MG/DL (ref 0.66–1.25)
GFR SERPL CREATININE-BSD FRML MDRD: >90 ML/MIN/{1.73_M2}
GLUCOSE SERPL-MCNC: 105 MG/DL (ref 70–99)
POTASSIUM SERPL-SCNC: 4.3 MMOL/L (ref 3.4–5.3)
PSA SERPL-ACNC: 0.66 UG/L (ref 0–4)
SODIUM SERPL-SCNC: 140 MMOL/L (ref 133–144)

## 2019-03-15 LAB
BACTERIA SPEC CULT: NO GROWTH
SPECIMEN SOURCE: NORMAL

## 2019-03-18 ENCOUNTER — OFFICE VISIT (OUTPATIENT)
Dept: NURSING | Facility: CLINIC | Age: 39
End: 2019-03-18
Payer: MEDICARE

## 2019-03-18 DIAGNOSIS — R05.3 CHRONIC COUGH: ICD-10-CM

## 2019-03-18 DIAGNOSIS — R06.09 EXERTIONAL DYSPNEA: ICD-10-CM

## 2019-03-18 PROCEDURE — 94375 RESPIRATORY FLOW VOLUME LOOP: CPT | Performed by: INTERNAL MEDICINE

## 2019-03-18 PROCEDURE — 94726 PLETHYSMOGRAPHY LUNG VOLUMES: CPT | Performed by: INTERNAL MEDICINE

## 2019-03-18 PROCEDURE — 94729 DIFFUSING CAPACITY: CPT | Performed by: INTERNAL MEDICINE

## 2019-03-18 NOTE — PROGRESS NOTES
PFT Lab Note: Complete PFT (isabella, DLCO, pleth) done per Dr. Benítez.    Pt refused bronchodilator testing due to previous adverse reaction to bronchodilator.

## 2019-03-19 LAB
DLCOCOR-%PRED-PRE: 110 %
DLCOCOR-PRE: 37.75 ML/MIN/MMHG
DLCOUNC-%PRED-PRE: 112 %
DLCOUNC-PRE: 38.37 ML/MIN/MMHG
DLCOUNC-PRED: 34.06 ML/MIN/MMHG
ERV-%PRED-PRE: 112 %
ERV-PRE: 1.33 L
ERV-PRED: 1.18 L
EXPTIME-PRE: 7.23 SEC
FEF2575-%PRED-PRE: 70 %
FEF2575-PRE: 3.17 L/SEC
FEF2575-PRED: 4.51 L/SEC
FEFMAX-%PRED-PRE: 101 %
FEFMAX-PRE: 10.98 L/SEC
FEFMAX-PRED: 10.8 L/SEC
FEV1-%PRED-PRE: 97 %
FEV1-PRE: 4.54 L
FEV1FEV6-PRE: 73 %
FEV1FEV6-PRED: 82 %
FEV1FVC-PRE: 72 %
FEV1FVC-PRED: 81 %
FEV1SVC-PRE: 73 %
FEV1SVC-PRED: 78 %
FIFMAX-PRE: 6.8 L/SEC
FRCPLETH-%PRED-PRE: 102 %
FRCPLETH-PRE: 3.63 L
FRCPLETH-PRED: 3.55 L
FVC-%PRED-PRE: 108 %
FVC-PRE: 6.28 L
FVC-PRED: 5.78 L
IC-%PRED-PRE: 102 %
IC-PRE: 4.9 L
IC-PRED: 4.78 L
RVPLETH-%PRED-PRE: 115 %
RVPLETH-PRE: 2.31 L
RVPLETH-PRED: 2 L
TLCPLETH-%PRED-PRE: 111 %
TLCPLETH-PRE: 8.54 L
TLCPLETH-PRED: 7.63 L
VA-%PRED-PRE: 116 %
VA-PRE: 8.51 L
VC-%PRED-PRE: 104 %
VC-PRE: 6.23 L
VC-PRED: 5.96 L

## 2019-03-29 ENCOUNTER — HOSPITAL ENCOUNTER (OUTPATIENT)
Dept: MRI IMAGING | Facility: CLINIC | Age: 39
Discharge: HOME | End: 2019-03-29
Attending: INTERNAL MEDICINE | Admitting: INTERNAL MEDICINE
Payer: MEDICARE

## 2019-03-29 DIAGNOSIS — R93.1 ABNORMAL ECHOCARDIOGRAM: ICD-10-CM

## 2019-03-29 DIAGNOSIS — R06.09 EXERTIONAL DYSPNEA: ICD-10-CM

## 2019-03-29 PROCEDURE — A9585 GADOBUTROL INJECTION: HCPCS | Performed by: INTERNAL MEDICINE

## 2019-03-29 PROCEDURE — 75561 CARDIAC MRI FOR MORPH W/DYE: CPT

## 2019-03-29 PROCEDURE — 25500064 ZZH RX 255 OP 636: Performed by: INTERNAL MEDICINE

## 2019-03-29 PROCEDURE — 75561 CARDIAC MRI FOR MORPH W/DYE: CPT | Mod: 26 | Performed by: INTERNAL MEDICINE

## 2019-03-29 RX ORDER — GADOBUTROL 604.72 MG/ML
7.5 INJECTION INTRAVENOUS ONCE
Status: COMPLETED | OUTPATIENT
Start: 2019-03-29 | End: 2019-03-29

## 2019-03-29 RX ADMIN — GADOBUTROL 7.5 ML: 604.72 INJECTION INTRAVENOUS at 10:59

## 2019-04-05 ENCOUNTER — ANCILLARY PROCEDURE (OUTPATIENT)
Dept: ULTRASOUND IMAGING | Facility: CLINIC | Age: 39
End: 2019-04-05
Attending: NURSE PRACTITIONER
Payer: MEDICARE

## 2019-04-05 DIAGNOSIS — R10.84 ABDOMINAL PAIN, GENERALIZED: ICD-10-CM

## 2019-04-05 DIAGNOSIS — R10.9 FLANK PAIN: ICD-10-CM

## 2019-04-05 PROCEDURE — 76700 US EXAM ABDOM COMPLETE: CPT | Performed by: RADIOLOGY

## 2019-05-02 ENCOUNTER — MEDICAL CORRESPONDENCE (OUTPATIENT)
Dept: HEALTH INFORMATION MANAGEMENT | Facility: CLINIC | Age: 39
End: 2019-05-02

## 2019-06-07 ENCOUNTER — TELEPHONE (OUTPATIENT)
Dept: FAMILY MEDICINE | Facility: CLINIC | Age: 39
End: 2019-06-07

## 2019-06-07 NOTE — TELEPHONE ENCOUNTER
Panel Management Review   One phone call and send letter if unable to reach them or MyChart message and send letter if not read after 2 weeks (You will get a message to your inbasket)      BP Readings from Last 1 Encounters:   03/12/19 138/80    ,   Lab Results   Component Value Date    A1C ERROR PATIENT NOT AVAILABLE FOR RETEST 12/23/2014       Health Maintenance Due   Topic Date Due     DTAP/TDAP/TD IMMUNIZATION (1 - Tdap) 07/06/2013     PHQ-9  02/24/2019        Fail List measure:     Depression / Dysthymia review    Measure:  Needs PHQ-9 score of 4 or less during index window.  Administer PHQ-9 and if score is 5 or more, send encounter to provider for next steps.    5 - 7 month window range: 02/23/2018-06/23/2019    PHQ-9 SCORE 8/30/2017 4/24/2018 8/24/2018   PHQ-9 Total Score - - -   PHQ-9 Total Score 6 16 17       If PHQ-9 recheck is 5 or more, route to provider for next steps.    Patient is due for:  PHQ9        Patient is due/failing the following:   PHQ9    Action needed:   Patient needs to do PHQ9.    Type of outreach:    Sent [email or web address] message.    Questions for provider review:    None                                                                                       Chart routed to n/a Amy Price

## 2019-06-29 ENCOUNTER — NURSE TRIAGE (OUTPATIENT)
Dept: NURSING | Facility: CLINIC | Age: 39
End: 2019-06-29

## 2019-06-29 NOTE — TELEPHONE ENCOUNTER
Rafa went on a walk yesterday and was getting bit by something and now has red lumps on legs and sting and itch.  Denies fever and pus or drainage.  Denies red streak.  Rafa has not used anything on bites.      Additional Information    Negative: [1] Life-threatening reaction (anaphylaxis) in the past to same insect bite AND [2] < 2 hours since bite    Negative: Passed out (i.e., lost consciousness, collapsed and was not responding)    Negative: Difficulty breathing or wheezing    Negative: [1] Hoarseness or cough AND [2] sudden onset following bite    Negative: [1] Difficulty swallowing or slurred speech AND [2] sudden onset following bite    Negative: Sounds like a life-threatening emergency to the triager    Negative: Patient sounds very sick or weak to the triager    Negative: [1] SEVERE bite pain AND [2] not improved after 2 hours of pain medicine    Negative: [1] Fever AND [2] red area    Negative: [1] Fever AND [2] area is very tender to touch    Negative: [1] Red streak or red line AND [2] length > 2 inches (5 cm)    Negative: [1] Red or very tender (to touch) area AND [2] started over 24 hours after the bite    Negative: [1] Red or very tender (to touch) area AND [2] getting larger over 48 hours after the bite    Negative: [1] SEVERE local itching (i.e., interferes with work, school, sleep) AND [2] not improved after 24 hours of hydrocortisone cream    Negative: [1] After 14 days AND [2] insect bite isn't healed    Negative: [1] Scab is present AND [2] it drains pus or increases in size AND [3] not improved after applying antibiotic ointment for 2 days    Negative: Bite starts to look bad (e.g., blister, purplish skin, ulcer) (Exception:  just swelling or small red bump)    Negative: [1] Scab is present AND [2] it drains pus or increases in size    Itchy insect bite    Protocols used: [facility]

## 2019-07-12 ENCOUNTER — OFFICE VISIT (OUTPATIENT)
Dept: FAMILY MEDICINE | Facility: CLINIC | Age: 39
End: 2019-07-12
Payer: MEDICARE

## 2019-07-12 VITALS
HEIGHT: 73 IN | BODY MASS INDEX: 36.84 KG/M2 | SYSTOLIC BLOOD PRESSURE: 110 MMHG | RESPIRATION RATE: 22 BRPM | HEART RATE: 81 BPM | OXYGEN SATURATION: 98 % | WEIGHT: 278 LBS | DIASTOLIC BLOOD PRESSURE: 70 MMHG | TEMPERATURE: 98.7 F

## 2019-07-12 DIAGNOSIS — S80.869A INSECT BITE OF LOWER LEG, UNSPECIFIED LATERALITY, INITIAL ENCOUNTER: Primary | ICD-10-CM

## 2019-07-12 DIAGNOSIS — R21 RASH AND NONSPECIFIC SKIN ERUPTION: ICD-10-CM
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
## 2019-07-12 PROCEDURE — 99213 OFFICE O/P EST LOW 20 MIN: CPT | Performed by: NURSE PRACTITIONER

## 2019-07-12 RX ORDER — HYDROCORTISONE VALERATE 2 MG/G
OINTMENT TOPICAL 2 TIMES DAILY
Qty: 60 G | Refills: 1 | Status: SHIPPED | OUTPATIENT
Start: 2019-07-12 | End: 2019-07-16

## 2019-07-12 ASSESSMENT — PAIN SCALES - GENERAL: PAINLEVEL: NO PAIN (0)

## 2019-07-12 ASSESSMENT — MIFFLIN-ST. JEOR: SCORE: 2231.94

## 2019-07-12 NOTE — PROGRESS NOTES
Subjective     Rafa Cochran is a 39 year old male who presents to clinic today for the following health issues:    HPI   Rash  Onset: 2 weeks ago    Description:   Location: Bilateral legs  Character: red, Welts  Itching (Pruritis): no     Progression of Symptoms:  improving    Accompanying Signs & Symptoms:  Fever: no   Body aches or joint pain: no   Sore throat symptoms: no   Recent cold symptoms: no     History:   Previous similar rash: no     Precipitating factors:   Exposure to similar rash: no   New exposures: None but went on a long walk the day they came about  Recent travel: no     Alleviating factors:      Therapies Tried and outcome: benadryl which helped with the swelling and slight itching    It stung also. Rash went down quite a bit after 3 days so didn't come in there. Went on 10 mile walk, lots of nats, and bugs noted. Did not see ticks on him. No new joint pain. No crusting or skin breakage.     Patient also asking about random moles and skin areas.  Recommend a dermatology consult.    Patient Active Problem List   Diagnosis     Major depression in partial remission (H)     Bipolar affective disorder (H)     OCD (obsessive compulsive disorder)     Schizoaffective disorder (H)     Sinus tachycardia     Palpitations     Hyperlipidemia LDL goal <160     Hypertriglyceridemia     Prediabetes     Panic attack     BABATUNDE (obstructive sleep apnea)- moderate (AHI 17)     Pain in joint of right shoulder     Sprain and strain of shoulder and upper arm, left, initial encounter     Shoulder pain, left     Hyperglycemia     Morbid obesity due to GERD (H)     Gastroesophageal reflux disease, esophagitis presence not specified     Family history of cardiac arrhythmia     Past Surgical History:   Procedure Laterality Date     APPENDECTOMY         Social History     Tobacco Use     Smoking status: Former Smoker     Last attempt to quit: 2013     Years since quittin.5     Smokeless tobacco: Never Used      Tobacco comment: 5 cigs per day   Substance Use Topics     Alcohol use: Yes     Alcohol/week: 0.0 oz     Comment: rare use     Family History   Problem Relation Age of Onset     C.A.D. Maternal Grandfather      Diabetes Maternal Grandfather      Hypertension Maternal Grandfather      Heart Disease Maternal Grandfather      C.A.D. Paternal Grandmother      Diabetes Paternal Grandmother      Circulatory Paternal Grandmother      Gastrointestinal Disease Paternal Grandmother      Hypertension Paternal Grandfather      Circulatory Paternal Grandfather      Heart Disease Paternal Grandfather      Gastrointestinal Disease Paternal Grandfather      Heart Failure Paternal Grandfather      Hypertension Father      Alcohol/Drug Father      Allergies Father      Depression Father      Gastrointestinal Disease Father      Prostate Cancer Father      Cerebrovascular Disease Maternal Grandmother      Arthritis Maternal Grandmother      Heart Disease Maternal Grandmother      Alcohol/Drug Mother      Allergies Mother      Circulatory Mother      Depression Mother      Heart Disease Mother      Diabetes Mother      Alcohol/Drug Sister      Allergies Sister      Depression Sister      Genitourinary Problems Sister          Current Outpatient Medications   Medication Sig Dispense Refill     hydrocortisone valerate (WEST-STACEY) 0.2 % external ointment Apply topically 2 times daily for 7 days 60 g 1     econazole nitrate 1 % external cream Apply topically daily 85 g 5     hydrOXYzine (VISTARIL) 50 MG capsule Take 1 capsule (50 mg) by mouth 3 times daily as needed for itching 60 capsule 1     lorazepam (ATIVAN) 2 MG tablet Take  by mouth 4 times daily. 30 tablet 0     mirtazapine (REMERON) 30 MG tablet TK 1 T PO HS  3     ORDER FOR DME autoCPAP 8cm       ranitidine (ZANTAC) 150 MG tablet Take 1 tablet (150 mg) by mouth 2 times daily 180 tablet 1     terbinafine (LAMISIL) 1 % external cream Apply topically 2 times daily 30 g 11  "    Allergies   Allergen Reactions     Nicotine Rash     Nicotine Patches     Pertussis Toxoid      Bad reaction as a child       Reviewed and updated as needed this visit by Provider         Review of Systems   ROS COMP:skin as above      Objective    /70 (BP Location: Right arm, Patient Position: Sitting, Cuff Size: Adult Large)   Pulse 81   Temp 98.7  F (37.1  C) (Oral)   Resp 22   Ht 1.842 m (6' 0.5\")   Wt 126.1 kg (278 lb)   SpO2 98%   BMI 37.19 kg/m    Body mass index is 37.19 kg/m .  Physical Exam   GENERAL: healthy, alert and no distress  SKIN: Faint rash noted around ankles, scattered moles of varying color on arms/legs    Diagnostic Test Results:  Labs reviewed in Epic        Assessment & Plan     2. Insect bite of lower leg, unspecified laterality, initial encounter  Trial topical steroid daily for the next 7 days.  Rash should continue to improve  - hydrocortisone valerate (WEST-STACEY) 0.2 % external ointment; Apply topically 2 times daily for 7 days  Dispense: 60 g; Refill: 1    3. Rash and nonspecific skin eruption  Follow-up with dermatology for general body check.  Patient admits he does not use sunscreen, advised to use it.  None of his moles or skin areas appear like cancer  - DERMATOLOGY REFERRAL     BMI:   Estimated body mass index is 37.19 kg/m  as calculated from the following:    Height as of this encounter: 1.842 m (6' 0.5\").    Weight as of this encounter: 126.1 kg (278 lb).           Return in about 1 week (around 7/19/2019), or if symptoms worsen or fail to improve.    NAS Smith, NP-C  [facility]    This chart was documented by provider using a voice activated software called Dragon in addition to manual typing. There may be vocabulary errors or other grammatical errors due to this.       "

## 2019-07-12 NOTE — PATIENT INSTRUCTIONS
Thin layer where rash is, twice a day, then stop after 7 days  Send message next Tuesday if not improving      GI: [facility], [facility]: [phone]

## 2019-07-15 ENCOUNTER — TELEPHONE (OUTPATIENT)
Dept: FAMILY MEDICINE | Facility: CLINIC | Age: 39
End: 2019-07-15

## 2019-07-15 DIAGNOSIS — R21 RASH: Primary | ICD-10-CM

## 2019-07-15 NOTE — TELEPHONE ENCOUNTER
PA for hydrocortisone valerate (WEST-STACEY) 0.2 % external ointment    Plan does not cover    Phone# [phone]    ID# [id number]    PA or alternative    Linda Lloyd

## 2019-07-16 RX ORDER — TRIAMCINOLONE ACETONIDE 1 MG/G
CREAM TOPICAL 2 TIMES DAILY
Qty: 45 G | Refills: 0 | Status: SHIPPED | OUTPATIENT
Start: 2019-07-16 | End: 2019-10-22

## 2019-07-25 ENCOUNTER — TELEPHONE (OUTPATIENT)
Dept: FAMILY MEDICINE | Facility: CLINIC | Age: 39
End: 2019-07-25

## 2019-07-25 NOTE — TELEPHONE ENCOUNTER
1st attempt to reach the patient and schedule Dermatology Referral; osiris.    Mary Douglass  [facility]  Adult Med Spec/Surg Spec   [phone]

## 2019-07-31 DIAGNOSIS — K21.00 GASTROESOPHAGEAL REFLUX DISEASE WITH ESOPHAGITIS: ICD-10-CM

## 2019-07-31 NOTE — TELEPHONE ENCOUNTER
"Requested Prescriptions   Pending Prescriptions Disp Refills     ranitidine (ZANTAC) 150 MG tablet [Pharmacy Med Name: RANITIDINE 150MG TABLETS]  Last Written Prescription Date:  01/10/19  Last Fill Quantity: 180,  # refills: 1   Last Office Visit with G, [facility] or [facility] prescribing provider:  07/12/19-Juve   Future Office Visit:    180 tablet 0     Sig: TAKE 1 TABLET(150 MG) BY MOUTH TWICE DAILY       H2 Blockers Protocol Passed - 7/31/2019 11:14 AM        Passed - Patient is age 12 or older        Passed - Recent (12 mo) or future (30 days) visit within the authorizing provider's specialty     Patient had office visit in the last 12 months or has a visit in the next 30 days with authorizing provider or within the authorizing provider's specialty.  See \"Patient Info\" tab in inbasket, or \"Choose Columns\" in Meds & Orders section of the refill encounter.              Passed - Medication is active on med list          "

## 2019-08-01 NOTE — TELEPHONE ENCOUNTER
Prescription approved per [facility] Refill Protocol.  Lenora Sarkar RN

## 2019-08-10 ENCOUNTER — NURSE TRIAGE (OUTPATIENT)
Dept: NURSING | Facility: CLINIC | Age: 39
End: 2019-08-10

## 2019-08-10 NOTE — TELEPHONE ENCOUNTER
"  Reason for Disposition    Problems with anxiety or stress    Additional Information    Negative: Passed out (i.e., lost consciousness, collapsed and was not responding)    Negative: Shock suspected (e.g., cold/pale/clammy skin, too weak to stand, low BP, rapid pulse)    Negative: Difficult to awaken or acting confused (e.g., disoriented, slurred speech)    Negative: Visible sweat on face or sweat dripping down face    Negative: Unable to walk, or can only walk with assistance (e.g., requires support)    Negative: [1] Received SHOCK from implantable cardiac defibrillator AND [2] persisting symptoms (i.e., palpitations, lightheadedness)    Negative: Sounds like a life-threatening emergency to the triager    Negative: Chest pain    Negative: Difficulty breathing    Negative: Dizziness, lightheadedness, or weakness    Negative: [1] Heart beating very rapidly (e.g., > 140 / minute) AND [2] present now  (Exception: during exercise)    Negative: Heart beating very slowly (e.g., < 50 / minute)  (Exception: athlete)    Negative: New or worsened shortness of breath with activity (dyspnea on exertion)    Negative: Patient sounds very sick or weak to the triager    Negative: [1] Heart beating very rapidly (e.g., > 140 / minute) AND [2] not present now  (Exception: during exercise)    Negative: [1] Skipped or extra beat(s) AND [2] increases with exercise or exertion    Negative: [1] Skipped or extra beat(s) AND [2] occurs 4 or more times per minute    Negative: New or worsened ankle swelling    Negative: History of heart disease  (i.e., heart attack, bypass surgery, angina, angioplasty, CHF) (Exception: brief heart beat symptoms that went away and now feels well)    Negative: Age > 60 years (Exception: brief heart beat symptoms that went away and now feels well)    Negative: Taking water pill (i.e., diuretic) or heart medication (e.g., digoxin)    Negative: Wearing a \"holter monitor\" or \"cardiac event monitor\"    Negative: [1] " "Received SHOCK from implantable cardiac defibrillator AND [2] now feels well    Negative: History of hyperthyroidism or taking thyroid medication    Negative: Known or suspected substance abuse (e.g., cocaine, alcohol abuse)    Negative: [1] Palpitations AND [2] no improvement after using CARE ADVICE    Answer Assessment - Initial Assessment Questions  1. DESCRIPTION: \"Please describe your heart rate or heart beat that you are having\" (e.g., fast/slow, regular/irregular, skipped or extra beats, \"palpitations\")      Fitbit said lower than normal heart rate  2. ONSET: \"When did it start?\" (Minutes, hours or days)       When first getting up from a nap  3. DURATION: \"How long does it last\" (e.g., seconds, minutes, hours)      Seconds?  4. PATTERN \"Does it come and go, or has it been constant since it started?\"  \"Does it get worse with exertion?\"   \"Are you feeling it now?\"      ok  5. TAP: \"Using your hand, can you tap out what you are feeling on a chair or table in front of you, so that I can hear?\" (Note: not all patients can do this)        Unable   6. HEART RATE: \"Can you tell me your heart rate?\" \"How many beats in 15 seconds?\"  (Note: not all patients can do this)        unable  7. RECURRENT SYMPTOM: \"Have you ever had this before?\" If so, ask: \"When was the last time?\" and \"What happened that time?\"       no  8. CAUSE: \"What do you think is causing the palpitations?\"      ?  9. CARDIAC HISTORY: \"Do you have any history of heart disease?\" (e.g., heart attack, angina, bypass surgery, angioplasty, arrhythmia)       no  10. OTHER SYMPTOMS: \"Do you have any other symptoms?\" (e.g., dizziness, chest pain, sweating, difficulty breathing)        Cold hands and feet  11. PREGNANCY: \"Is there any chance you are pregnant?\" \"When was your last menstrual period?\"        no    Protocols used: HEART RATE AND HEARTBEAT [id number]    "

## 2019-08-10 NOTE — TELEPHONE ENCOUNTER
"Patient calling because his Fitbit told him that his heartrate was \"lower than normal\" when he first stood up after lying down for a nap today. He said his hands and feelt \"really cold\" at that time, but no other symptoms and he feels normal now.  Chiqui Cortez RN  [facility]    "

## 2019-10-22 ENCOUNTER — OFFICE VISIT (OUTPATIENT)
Dept: GASTROENTEROLOGY | Facility: CLINIC | Age: 39
End: 2019-10-22
Attending: NURSE PRACTITIONER
Payer: MEDICARE

## 2019-10-22 VITALS
OXYGEN SATURATION: 97 % | DIASTOLIC BLOOD PRESSURE: 78 MMHG | SYSTOLIC BLOOD PRESSURE: 141 MMHG | WEIGHT: 283.58 LBS | HEART RATE: 94 BPM | HEIGHT: 75 IN | BODY MASS INDEX: 35.26 KG/M2

## 2019-10-22 DIAGNOSIS — K21.9 GASTROESOPHAGEAL REFLUX DISEASE, ESOPHAGITIS PRESENCE NOT SPECIFIED: Primary | ICD-10-CM

## 2019-10-22 DIAGNOSIS — R13.19 ESOPHAGEAL DYSPHAGIA: ICD-10-CM

## 2019-10-22 PROCEDURE — 99204 OFFICE O/P NEW MOD 45 MIN: CPT | Performed by: INTERNAL MEDICINE

## 2019-10-22 ASSESSMENT — MIFFLIN-ST. JEOR: SCORE: 2296.94

## 2019-10-22 ASSESSMENT — PAIN SCALES - GENERAL: PAINLEVEL: NO PAIN (1)

## 2019-10-22 NOTE — NURSING NOTE
"Rafa Cochran's goals for this visit include:   Chief Complaint   Patient presents with     New Patient     Rafa is visiting to discuss stomach/ chest pain, reflux and \"spasaming\"     He requests these members of his care team be copied on today's visit information:     PCP: Marina Khanna    Referring Provider:  Francoise An NP  [address] [place] [address]    BP (!) 141/78 (BP Location: Left arm, Patient Position: Chair, Cuff Size: Adult Large)   Pulse 94   Ht 1.905 m (6' 3\")   Wt 128.6 kg (283 lb 9.3 oz)   SpO2 97%   BMI 35.45 kg/m      Do you need any medication refills at today's visit? No    Emy Baez LPN    "

## 2019-10-22 NOTE — PATIENT INSTRUCTIONS
Start omeprazole 20mg daily.  Take 30 minutes prior to first meal of the day.    Ok to take Pepcid (famotidine) at night as needed.     Schedule upper endoscopy with [facility]. Call [phone] to schedule.      Follow up with Aaliyah Shepard in 2 months.

## 2019-10-24 NOTE — PROGRESS NOTES
"      GASTROENTEROLOGY NEW PATIENT CLINIC VISIT    CC/REFERRING MD:    Marina Khanna    REASON FOR CONSULTATION:   Francoise An for   Chief Complaint   Patient presents with     New Patient     Rafa is visiting to discuss stomach/ chest pain, reflux and \"spasaming\"     HISTORY OF PRESENT ILLNESS:    Rafa Cochran is 39 year old male who presents for evaluation of chest pain.  He reports that he has had recent issues with chest pain.  He gets chest pain which lasts for seconds at a time and then resolve spontaneously.  Additionally he notes that he has had symptoms of globus, belching and dizziness.  The pain in the chest is worse when doing exercises he describes it as a spastic type pain in his chest.  He notes that he did undergo a cardiology evaluation for this and it was unremarkable he also reports that he has worse symptoms at night or in the morning when using his CPAP machine.  He feels that the air from his CPAP machine is going in his neck he has a lot of itching and flatus after he uses his CPAP.  He notes that symptoms are worse when he has nasal blockages is very frequent for him.  He does have irregular bowel habits constipation or diarrhea alternating.  He notes that he has been tried on omeprazole past.  He does not have any dysphagia but does have some regurgitation.  He notes that he has been diagnosed in the past with a jumbo throat.  His last EGD was 10 years ago.  He is currently taking Zantac twice daily for the past year with some relief of symptoms.  He did have an ultrasound in the past and which noted a fatty liver but no stones in the gallbladder or bile duct.    PREVIOUS ENDOSCOPY:      PROBLEM LIST  Patient Active Problem List    Diagnosis Date Noted     Family history of cardiac arrhythmia 04/24/2018     Priority: Medium     Hyperglycemia 01/08/2018     Priority: Medium     Morbid obesity due to GERD (H) 01/08/2018     Priority: Medium     Gastroesophageal " reflux disease, esophagitis presence not specified 01/08/2018     Priority: Medium     Sprain and strain of shoulder and upper arm, left, initial encounter 10/19/2015     Priority: Medium     Shoulder pain, left 10/19/2015     Priority: Medium     Pain in joint of right shoulder 08/12/2015     Priority: Medium     Diagnosis updated by automated process. Provider to review and confirm.       BABATUNDE (obstructive sleep apnea)- moderate (AHI 17) 12/30/2014     Priority: Medium     Study Date: 12/29/2014- (270.3 lbs)  The lowest oxygen saturation was 86.0%. Apnea/Hypopnea Index was 16.9 events per hour.  The REM AHI was 51.4.  The RERA index was 17.3 per hour.   The RDI was 34.2.  CPAP optimal pressure was 8 with an AHI of 1.4. Supine REM was seen at this pressure.        Hyperlipidemia LDL goal <160 07/05/2013     Priority: Medium     Hypertriglyceridemia 07/05/2013     Priority: Medium     Prediabetes 07/05/2013     Priority: Medium     Panic attack 07/05/2013     Priority: Medium     Sinus tachycardia 06/02/2011     Priority: Medium     Palpitations 06/02/2011     Priority: Medium     OCD (obsessive compulsive disorder) 01/07/2011     Priority: Medium     Schizoaffective disorder (H) 01/07/2011     Priority: Medium     Dx at age 10. Disabled from mental health since age 17. Dr. Jethro Wakefield at [facility]   Now seeing Dr. Adhikari at [facility]       Major depression in partial remission (H) 09/20/2010     Priority: Medium     Bipolar affective disorder (H) 09/20/2010     Priority: Medium     Dx at age 10. Disabled from mental health since age 17. Dr. Jethro Wakefield at [facility].   Now seeing Dr. 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     History of gastric ulcer 5/6/2013     Ulcer          PREVIOUS SURGERIES: (I personally reviewed this history with  the patient at today's visit)   Past Surgical History:   Procedure Laterality Date     APPENDECTOMY           ALLERGIES:     Allergies   Allergen Reactions     Nicotine Rash     Nicotine Patches     Pertussis Toxoid      Bad reaction as a child       PERTINENT MEDICATIONS:    Current Outpatient Medications:      lorazepam (ATIVAN) 2 MG tablet, Take  by mouth 4 times daily., Disp: 30 tablet, Rfl: 0     mirtazapine (REMERON) 30 MG tablet, TK 1 T PO HS, Disp: , Rfl: 3     omeprazole (PRILOSEC) 20 MG DR capsule, Take 1 capsule (20 mg) by mouth daily, Disp: 30 capsule, Rfl: 5     ORDER FOR DME, autoCPAP 8cm, Disp: , Rfl:      ranitidine (ZANTAC) 150 MG tablet, TAKE 1 TABLET(150 MG) BY MOUTH TWICE DAILY, Disp: 180 tablet, Rfl: 0     terbinafine (LAMISIL) 1 % external cream, Apply topically 2 times daily (Patient not taking: Reported on 10/22/2019), Disp: 30 g, Rfl: 11    SOCIAL HISTORY:  Social History     Socioeconomic History     Marital status: Single     Spouse name: Not on file     Number of children: Not on file     Years of education: Not on file     Highest education level: Not on file   Occupational History     Not on file   Social Needs     Financial resource strain: Not on file     Food insecurity:     Worry: Not on file     Inability: Not on file     Transportation needs:     Medical: Not on file     Non-medical: Not on file   Tobacco Use     Smoking status: Former Smoker     Last attempt to quit: 2013     Years since quittin.8     Smokeless tobacco: Never Used     Tobacco comment: 5 cigs per day   Substance and Sexual Activity     Alcohol use: Yes     Alcohol/week: 0.0 standard drinks     Comment: rare use     Drug use: No     Sexual activity: Not Currently     Partners: Female   Lifestyle     Physical activity:     Days per week: Not on file     Minutes per session: Not on file     Stress: Not on file   Relationships     Social connections:     Talks on phone: Not on file     Gets together: Not on  file     Attends [employer] service: Not on file     Active member of club or organization: Not on file     Attends meetings of clubs or organizations: Not on file     Relationship status: Not on file     Intimate partner violence:     Fear of current or ex partner: Not on file     Emotionally abused: Not on file     Physically abused: Not on file     Forced sexual activity: Not on file   Other Topics Concern     Parent/sibling w/ CABG, MI or angioplasty before 65F 55M? Not Asked   Social History Narrative     Not on file       FAMILY HISTORY: (I personally reviewed this history with the patient at today's visit)  Family History   Problem Relation Age of Onset     C.A.D. Maternal Grandfather      Diabetes Maternal Grandfather      Hypertension Maternal Grandfather      Heart Disease Maternal Grandfather      C.A.D. Paternal Grandmother      Diabetes Paternal Grandmother      Circulatory Paternal Grandmother      Gastrointestinal Disease Paternal Grandmother      Hypertension Paternal Grandfather      Circulatory Paternal Grandfather      Heart Disease Paternal Grandfather      Gastrointestinal Disease Paternal Grandfather      Heart Failure Paternal Grandfather      Hypertension Father      Alcohol/Drug Father      Allergies Father      Depression Father      Gastrointestinal Disease Father      Prostate Cancer Father      Cerebrovascular Disease Maternal Grandmother      Arthritis Maternal Grandmother      Heart Disease Maternal Grandmother      Alcohol/Drug Mother      Allergies Mother      Circulatory Mother      Depression Mother      Heart Disease Mother      Diabetes Mother      Alcohol/Drug Sister      Allergies Sister      Depression Sister      Genitourinary Problems Sister          ROS:    No fevers or chills  No weight loss  No blurry vision, double vision or change in vision  No sore throat  No lymphadenopathy  No headache, paraesthesias, or weakness in a limb  No shortness of breath or wheezing  No chest  "pain or pressure  No arthralgias or myalgias  No rashes or skin changes  No odynophagia or dysphagia  No BRBPR, hematochezia, melena  No dysuria, frequency or urgency  No hot/cold intolerance or polyria  No anxiety or depression  PHYSICAL EXAMINATION:  Constitutional: aaox3, cooperative, pleasant, not dyspneic/diaphoretic, no acute distress  Vitals reviewed: BP (!) 141/78 (BP Location: Left arm, Patient Position: Chair, Cuff Size: Adult Large)   Pulse 94   Ht 1.905 m (6' 3\")   Wt 128.6 kg (283 lb 9.3 oz)   SpO2 97%   BMI 35.45 kg/m    Wt:   Wt Readings from Last 2 Encounters:   10/22/19 128.6 kg (283 lb 9.3 oz)   07/12/19 126.1 kg (278 lb)      Eyes: Sclera anicteric/injected  Ears/nose/mouth/throat: Normal oropharynx without ulcers or exudate, mucus membranes moist, hearing intact  Neck: supple, thyroid normal size  CV: No edema, RRR  Respiratory: Unlabored breathing, CTAB  Lymph: No submandibular, supraclavicular or inguinal lymphadenopathy  Abd: Nondistended, no masses, +bs, no hepatosplenomegaly, nontender, no peritoneal signs  Skin: warm, perfused, no jaundice  Psych: Normal affect  MSK: Normal gait      PERTINENT STUDIES: (I personally reviewed these laboratory studies today)  Most recent CBC:  Recent Labs   Lab Test 03/12/19  1742 01/04/18  1553   WBC 7.7 7.3   HGB 15.2 14.7   HCT 43.3 42.6    293     Most recent hepatic panel:  Recent Labs   Lab Test 01/04/18  1553 12/26/12  1825   ALT 41 42   AST 23 29     Most recent creatinine:  Recent Labs   Lab Test 03/12/19  1742 01/04/18  1553   CR 0.82 1.01       RADIOLOGY:   US ABDOMEN COMPLETE   4/5/2019 9:33 AM       HISTORY: abdominal pain; Abdominal pain, generalized; Flank pain     COMPARISON: None     FINDINGS: The liver has a diffusely increased echotexture with no  focal abnormality. Liver span is 20.4 cm. Visualized portions of the  pancreas are normal. The spleen is normal in size at 11.9 cm. The  gallbladder is normal. Sonographic Brown's " sign is negative. There  are no gallstones. Common duct measures 3 mm. The aorta and IVC are  normal. The right kidney measures 13.3 cm. The left kidney measures  13.3 cm. There is no hydronephrosis.                                                                      IMPRESSION: Enlarged, fatty liver.     MICHAEL AREVALO MD  (I personally reviewed the radiology images listed above)    ASSESSMENT/PLAN:    Rafa Cochran is a 39 year old male who presents for evaluation of atypical chest pain, changes in bowel habits.  The atypical chest pain could potentially be due to reflux symptoms, esophagitis or eosinophilic esophagitis.  I have recommended that we proceed with an upper endoscopy at this point.  If this is unremarkable, it may be worth him having evaluation with your nose and throat given his report of nasal blockages and worsening symptoms when he is using his CPAP.  I recommended that he initiate omeprazole 20 mg daily be taken 30 minutes prior to the first meal of the day.  I have recommended that he discontinue ranitidine given the recent concerns of carcinogens and some Zantac formulations.  I did advise him that it is fine for him to take Pepcid at night as needed.  We will plan to do the procedure and then he can follow-up with Wale Shepard in 2 months.  Should he continue to have atypical chest pain symptoms, I would suggest that we set him up for pH testing as well as esophageal manometry.         Gastroesophageal reflux disease, esophagitis presence not specified  Esophageal dysphagia  No orders of the defined types were placed in this encounter.        RTC 2 months with Aaliyah Shepard    Thank you for this consultation.  It was a pleasure to participate in the care of this patient; please contact us with any further questions.     This note was created with voice recognition software, and while reviewed for accuracy, typos may remain.     Sabas Grajeda MD  Adjunct  of  Medicine  Division of Gastroenterology, Hepatology and Nutrition  [facility]  [phone]

## 2019-11-11 ENCOUNTER — OFFICE VISIT (OUTPATIENT)
Dept: FAMILY MEDICINE | Facility: CLINIC | Age: 39
End: 2019-11-11
Payer: MEDICARE

## 2019-11-11 VITALS
TEMPERATURE: 97.8 F | HEIGHT: 75 IN | DIASTOLIC BLOOD PRESSURE: 84 MMHG | WEIGHT: 275 LBS | BODY MASS INDEX: 34.19 KG/M2 | HEART RATE: 98 BPM | SYSTOLIC BLOOD PRESSURE: 122 MMHG

## 2019-11-11 DIAGNOSIS — B35.4 TINEA CORPORIS: Primary | ICD-10-CM

## 2019-11-11 DIAGNOSIS — Z28.21 INFLUENZA VACCINATION DECLINED BY PATIENT: ICD-10-CM

## 2019-11-11 PROCEDURE — 99213 OFFICE O/P EST LOW 20 MIN: CPT | Performed by: NURSE PRACTITIONER

## 2019-11-11 ASSESSMENT — MIFFLIN-ST. JEOR: SCORE: 2258.02

## 2019-11-12 NOTE — PATIENT INSTRUCTIONS
At [facility], we strive to deliver an exceptional experience to you, every time we see you. If you receive a survey, please complete it as we do value your feedback.  If you have MyChart, you can expect to receive results automatically within 24 hours of their completion.  Your provider will send a note interpreting your results as well.   If you do not have MyChart, you should receive your results in about a week by mail.    Your care team:                            Family Medicine Internal Medicine   MD Bismark Landon MD Shantel Branch-Fleming, MD Katya Georgiev PA-C Megan Hill, APRRUPINDER Floyd, MD Pediatrics   Robert Gonzalez, PAKimC  Nataliia Diamond, MD Makayla Jama APRN CNP   MD Jen Mabry MD Deborah Mielke, MD Kim Thein, APRN CNP      Clinic hours: Monday - Thursday 7 am-7 pm; Fridays 7 am-5 pm.   Urgent care: Monday - Friday 11 am-9 pm; Saturday and Sunday 9 am-5 pm.  Pharmacy : Monday -Thursday 8 am-8 pm; Friday 8 am-6 pm; Saturday and Sunday 9 am-5 pm.     Clinic: [phone]   Pharmacy: [phone]

## 2019-11-12 NOTE — PROGRESS NOTES
Subjective     Rafa Cochran is a 39 year old male who presents to clinic today for the following health issues:    HPI   Rash  Onset: 1 1/2 week    Description:   Location: right wrist  Character: flakey, painful, draining  Itching (Pruritis): YES    Progression of Symptoms:  worsening    Accompanying Signs & Symptoms:  Fever: no   Body aches or joint pain: no   Sore throat symptoms: no   Recent cold symptoms: YES    History:   Previous similar rash: no     Precipitating factors:   Exposure to similar rash: no   New exposures: None   Recent travel: no     Alleviating factors:      Therapies Tried and outcome: cream and lotion      Patient Active Problem List   Diagnosis     Major depression in partial remission (H)     Bipolar affective disorder (H)     OCD (obsessive compulsive disorder)     Schizoaffective disorder (H)     Sinus tachycardia     Palpitations     Hyperlipidemia LDL goal <160     Hypertriglyceridemia     Prediabetes     Panic attack     BABATUNDE (obstructive sleep apnea)- moderate (AHI 17)     Pain in joint of right shoulder     Sprain and strain of shoulder and upper arm, left, initial encounter     Shoulder pain, left     Hyperglycemia     Morbid obesity due to GERD (H)     Gastroesophageal reflux disease, esophagitis presence not specified     Family history of cardiac arrhythmia     Past Surgical History:   Procedure Laterality Date     APPENDECTOMY         Social History     Tobacco Use     Smoking status: Former Smoker     Last attempt to quit: 2013     Years since quittin.8     Smokeless tobacco: Never Used     Tobacco comment: 5 cigs per day   Substance Use Topics     Alcohol use: Yes     Alcohol/week: 0.0 standard drinks     Comment: rare use     Family History   Problem Relation Age of Onset     C.A.D. Maternal Grandfather      Diabetes Maternal Grandfather      Hypertension Maternal Grandfather      Heart Disease Maternal Grandfather      C.A.D. Paternal Grandmother      Diabetes  "Paternal Grandmother      Circulatory Paternal Grandmother      Gastrointestinal Disease Paternal Grandmother      Hypertension Paternal Grandfather      Circulatory Paternal Grandfather      Heart Disease Paternal Grandfather      Gastrointestinal Disease Paternal Grandfather      Heart Failure Paternal Grandfather      Hypertension Father      Alcohol/Drug Father      Allergies Father      Depression Father      Gastrointestinal Disease Father      Prostate Cancer Father      Cerebrovascular Disease Maternal Grandmother      Arthritis Maternal Grandmother      Heart Disease Maternal Grandmother      Alcohol/Drug Mother      Allergies Mother      Circulatory Mother      Depression Mother      Heart Disease Mother      Diabetes Mother      Alcohol/Drug Sister      Allergies Sister      Depression Sister      Genitourinary Problems Sister          Current Outpatient Medications   Medication Sig Dispense Refill     lorazepam (ATIVAN) 2 MG tablet Take  by mouth 4 times daily. 30 tablet 0     mirtazapine (REMERON) 30 MG tablet TK 1 T PO HS  3     omeprazole (PRILOSEC) 20 MG DR capsule Take 1 capsule (20 mg) by mouth daily 30 capsule 5     ORDER FOR DME autoCPAP 8cm       BP Readings from Last 3 Encounters:   11/11/19 122/84   10/22/19 (!) 141/78   07/12/19 110/70    Wt Readings from Last 3 Encounters:   11/11/19 124.7 kg (275 lb)   10/22/19 128.6 kg (283 lb 9.3 oz)   07/12/19 126.1 kg (278 lb)            Reviewed and updated as needed this visit by Provider         Review of Systems   ROS COMP: Constitutional, HEENT, cardiovascular, pulmonary, gi and gu systems are negative, except as otherwise noted.      Objective    /84   Pulse 98   Temp 97.8  F (36.6  C)   Ht 1.905 m (6' 3\")   Wt 124.7 kg (275 lb)   BMI 34.37 kg/m    Body mass index is 34.37 kg/m .  Physical Exam   GENERAL: healthy, alert and no distress  EYES: Eyes grossly normal to inspection, PERRL and conjunctivae and sclerae normal  HENT: ear canals " "and TM's normal, nose and mouth without ulcers or lesions  NECK: no adenopathy, no asymmetry, masses, or scars and thyroid normal to palpation  RESP: lungs clear to auscultation - no rales, rhonchi or wheezes  CV: regular rate and rhythm, normal S1 S2, no S3 or S4, no murmur, click or rub, no peripheral edema and peripheral pulses strong  ABDOMEN: soft, nontender, no hepatosplenomegaly, no masses and bowel sounds normal  MS: no gross musculoskeletal defects noted, no edema  SKIN: erythematous maculopapular lesion on ulnar aspect right wrist with central clearing consistent with tinea corporis, otherwise, no suspicious lesions or rashes  NEURO: Normal strength and tone, mentation intact and speech normal  PSYCH: mentation appears normal, affect normal/bright  LYMPH: no cervical adenopathy    Diagnostic Test Results:  Labs reviewed in Epic  none         Assessment & Plan     1. Tinea corporis  Ok to use OTC 1% clotrimazole BID until lesioin has healed, do not use bandaid and this will make symptoms worse.   Return to clinic if not improved, new, or worsening symptoms.     2. Influenza vaccination declined by patient  Conscientious objectgr       BMI:   Estimated body mass index is 34.37 kg/m  as calculated from the following:    Height as of this encounter: 1.905 m (6' 3\").    Weight as of this encounter: 124.7 kg (275 lb).   Weight management plan: Discussed healthy diet and exercise guidelines        Regular exercise  See Patient Instructions    Return in about 4 weeks (around 12/9/2019), or if symptoms worsen or fail to improve, for Routine Visit.    NAS Rodriguez [facility]      "

## 2019-11-19 ENCOUNTER — OFFICE VISIT (OUTPATIENT)
Dept: PEDIATRICS | Facility: CLINIC | Age: 39
End: 2019-11-19
Payer: MEDICARE

## 2019-11-19 VITALS
OXYGEN SATURATION: 97 % | WEIGHT: 272.1 LBS | BODY MASS INDEX: 33.83 KG/M2 | DIASTOLIC BLOOD PRESSURE: 76 MMHG | HEART RATE: 74 BPM | HEIGHT: 75 IN | TEMPERATURE: 98.3 F | SYSTOLIC BLOOD PRESSURE: 136 MMHG

## 2019-11-19 DIAGNOSIS — K21.9 GASTROESOPHAGEAL REFLUX DISEASE, ESOPHAGITIS PRESENCE NOT SPECIFIED: ICD-10-CM

## 2019-11-19 DIAGNOSIS — F31.77 BIPOLAR DISORDER, IN PARTIAL REMISSION, MOST RECENT EPISODE MIXED (H): Chronic | ICD-10-CM

## 2019-11-19 DIAGNOSIS — Z87.19 H/O ESOPHAGEAL ULCER: ICD-10-CM

## 2019-11-19 DIAGNOSIS — Z01.818 PREOP GENERAL PHYSICAL EXAM: Primary | ICD-10-CM

## 2019-11-19 DIAGNOSIS — G47.33 OSA (OBSTRUCTIVE SLEEP APNEA): Chronic | ICD-10-CM

## 2019-11-19 DIAGNOSIS — R73.03 PREDIABETES: ICD-10-CM

## 2019-11-19 DIAGNOSIS — E78.2 MIXED DYSLIPIDEMIA: ICD-10-CM

## 2019-11-19 DIAGNOSIS — F41.0 PANIC ATTACK: ICD-10-CM

## 2019-11-19 DIAGNOSIS — N20.0 NEPHROLITHIASIS: ICD-10-CM

## 2019-11-19 DIAGNOSIS — E66.811 CLASS 1 OBESITY: ICD-10-CM

## 2019-11-19 PROCEDURE — 99214 OFFICE O/P EST MOD 30 MIN: CPT | Performed by: INTERNAL MEDICINE

## 2019-11-19 RX ORDER — MIRTAZAPINE 15 MG/1
15 TABLET, FILM COATED ORAL
Refills: 2 | COMMUNITY
Start: 2019-11-09 | End: 2022-04-19

## 2019-11-19 ASSESSMENT — MIFFLIN-ST. JEOR: SCORE: 2244.87

## 2019-11-19 NOTE — PROGRESS NOTES
[address] [address]  [phone]  Dept: [phone]    PRE-OP EVALUATION:  Today's date: 2019    Rafa Cochran (: 1980) presents for pre-operative evaluation assessment as requested by Dr. Sabas Grajeda.  He requires evaluation and anesthesia risk assessment prior to undergoing surgery/procedure for treatment of ESOPHAGOGASTRODUODENOSCOPY, WITH CO2 INSUFFLATION .    Proposed Surgery/ Procedure: ESOPHAGOGASTRODUODENOSCOPY, WITH CO2 INSUFFLATION  Date of Surgery/ Procedure: 19  Time of Surgery/ Procedure: 01:45 pm  Hospital/Surgical Facility: [facility]  Available electronically  Primary Physician: Marina Khanna  Type of Anesthesia Anticipated: General    Patient has a Health Care Directive or Living Will:  NO    1. NO - Do you have a history of heart attack, stroke, stent, bypass or surgery on an artery in the head, neck, heart or legs?  2. YES- Do you ever have any pain or discomfort in your chest? 19 MR Cardiac with Contrast  3. NO - Do you have a history of  Heart Failure?  4. YES - Are you troubled by shortness of breath when: walking on the level, up a slight hill or at night? Walking up hills and stairs, thinks it could be linked to his anxiety.  5. NO - Do you currently have a cold, bronchitis or other respiratory infection?  6. YES - Do you have a cough, shortness of breath or wheezing? SOB in the winter/cold weather  7. YES - Do you sometimes get pains in the calves of your legs when you walk?   8. YES- Do you or anyone in your family have previous history of blood clots? Mom has an artificial heart and is on blood thinners.  9. NO - Do you or does anyone in your family have a serious bleeding problem such as prolonged bleeding following surgeries or cuts?  10. YES - Have you ever had problems with anemia or been told to take iron pills? Patient, no iron pills, family hx of anemia  11. NO -  Have you had any abnormal blood loss such as black, tarry or bloody stools, or abnormal vaginal bleeding?  12. NO - Have you ever had a blood transfusion?  13. NO - Have you or any of your relatives ever had problems with anesthesia?  14. YES - Do you have sleep apnea, excessive snoring or daytime drowsiness? Sleep apnea  15. NO - Do you have any prosthetic heart valves?  16. NO - Do you have prosthetic joints?  17. NO - Is there any chance that you may be pregnant?      HPI:     HPI related to upcoming procedure:     72-year-old gentleman with bipolar 2 disorder, severe anxiety disorder, benzodiazepine dependent, obesity class I, obstructive sleep apnea on CPAP, mixed dyslipidemia and prior history of esophageal ulcers is to undergo upper endoscopy evaluation under anesthesia.  Patient lately has been having problems of increased heartburn in pain involving the lower chest and epigastric region.  He has been on PPI.  He had a cardiac work-up including MRI of the heart.  The cardiac work-up was negative.  Patient is a non-smoker denies alcohol use.        MEDICAL HISTORY:     Patient Active Problem List    Diagnosis Date Noted     Mixed dyslipidemia 11/19/2019     Priority: Medium     Family history of cardiac arrhythmia 04/24/2018     Priority: Medium     Hyperglycemia 01/08/2018     Priority: Medium     Morbid obesity due to GERD (H) 01/08/2018     Priority: Medium     Gastroesophageal reflux disease, esophagitis presence not specified 01/08/2018     Priority: Medium     Sprain and strain of shoulder and upper arm, left, initial encounter 10/19/2015     Priority: Medium     Shoulder pain, left 10/19/2015     Priority: Medium     Pain in joint of right shoulder 08/12/2015     Priority: Medium     Diagnosis updated by automated process. Provider to review and confirm.       BABATUNDE (obstructive sleep apnea)- moderate (AHI 17) 12/30/2014     Priority: Medium     Study Date: 12/29/2014- (270.3 lbs)  The lowest oxygen  saturation was 86.0%. Apnea/Hypopnea Index was 16.9 events per hour.  The REM AHI was 51.4.  The RERA index was 17.3 per hour.   The RDI was 34.2.  CPAP optimal pressure was 8 with an AHI of 1.4. Supine REM was seen at this pressure.        Prediabetes 07/05/2013     Priority: Medium     Panic attack 07/05/2013     Priority: Medium     H/O esophageal ulcer 01/01/2013     Priority: Medium     Sinus tachycardia 06/02/2011     Priority: Medium     Palpitations 06/02/2011     Priority: Medium     OCD (obsessive compulsive disorder) 01/07/2011     Priority: Medium     Schizoaffective disorder (H) 01/07/2011     Priority: Medium     Dx at age 10. Disabled from mental health since age 17. Dr. Jethro Wakefield at [facility]   Now seeing Dr. Adhikari at [facility]       Major depression in partial remission (H) 09/20/2010     Priority: Medium     Bipolar affective disorder (H) 09/20/2010     Priority: Medium     Dx at age 10. Disabled from mental health since age 17. Dr. Jethro Wakefield at [facility].   Now seeing Dr. Adhikari at [facility]       Nephrolithiasis 01/01/2009     Priority: Medium      Past Medical History:   Diagnosis Date     Diabetes mellitus (H)      H/O esophageal ulcer 2013     Nephrolithiasis 2009     Ulcer      Past Surgical History:   Procedure Laterality Date     APPENDECTOMY       Current Outpatient Medications   Medication Sig Dispense Refill     lorazepam (ATIVAN) 2 MG tablet Take 2 mg by mouth 3 times daily as needed Patient taking 3 mg in the morning, 2 mg in the afternoon and 2 mg in the evening. 30 tablet 0     mirtazapine (REMERON) 15 MG tablet 15 mg Patient taking 1.5 tablets at bedtime  2     omeprazole (PRILOSEC) 20 MG DR capsule Take 1 capsule (20 mg) by mouth daily 30 capsule 5     ORDER FOR DME autoCPAP 8cm       OTC products: None, except as noted above    Allergies   Allergen Reactions     Nicotine Rash     Nicotine Patches     Pertussis Toxoid      Bad reaction as a child      Latex  "Allergy: NO    Social History     Tobacco Use     Smoking status: Former Smoker     Last attempt to quit: 2013     Years since quittin.9     Smokeless tobacco: Never Used     Tobacco comment: 5 cigs per day   Substance Use Topics     Alcohol use: Yes     Alcohol/week: 0.0 standard drinks     Comment: rare use     History   Drug Use No       REVIEW OF SYSTEMS:   Constitutional, neuro, ENT, endocrine, pulmonary, cardiac, gastrointestinal, genitourinary, musculoskeletal, integument and psychiatric systems are negative, except as otherwise noted.    EXAM:   /76 (BP Location: Right arm, Patient Position: Sitting, Cuff Size: Adult Large)   Pulse 74   Temp 98.3  F (36.8  C) (Temporal)   Ht 1.905 m (6' 3\")   Wt 123.4 kg (272 lb 1.6 oz)   SpO2 97%   BMI 34.01 kg/m      GENERAL APPEARANCE: healthy, alert and no distress     EYES: EOMI,  PERRL     HENT: ear canals and TM's normal and nose and mouth without ulcers or lesions     NECK: no adenopathy, no asymmetry, masses, or scars and thyroid normal to palpation     RESP: lungs clear to auscultation - no rales, rhonchi or wheezes     CV: regular rates and rhythm, normal S1 S2, no S3 or S4 and no murmur, click or rub     ABDOMEN:  soft, nontender, no HSM or masses and bowel sounds normal     MS: extremities normal- no gross deformities noted, no evidence of inflammation in joints, FROM in all extremities.     SKIN: no suspicious lesions or rashes     NEURO: Normal strength and tone, sensory exam grossly normal, mentation intact and speech normal     PSYCH: mentation appears normal. and affect normal/bright     LYMPHATICS: No cervical adenopathy    DIAGNOSTICS:   No labs or EKG required for low risk surgery (cataract, skin procedure, breast biopsy, etc)    Recent Labs   Lab Test 19  1742 18  1553  14  1517   HGB 15.2 14.7   < >  --     293   < >  --     140  --   --    POTASSIUM 4.3 4.9  --   --    CR 0.82 1.01  --   --    A1C  " --   --   --  ERROR PATIENT NOT AVAILABLE FOR RETEST    < > = values in this interval not displayed.        IMPRESSION:   Diagnosis/reason for consult:    1.  Epigastric and mid chest pain with heartburn in spite of treatment with PPI.  Patient will undergo diagnostic upper endoscopy.  Has previous history of esophageal ulcers.  2.  Bipolar disorder type II under partial remission.  3.  Generalized anxiety disorder with panic attacks.  4.  Benzodiazepine dependency.  5.  Obstructive sleep apnea on CPAP.  6.  Class I obesity.  7.  Mixed dyslipidemia        The proposed surgical procedure is considered LOW risk.    REVISED CARDIAC RISK INDEX  The patient has the following serious cardiovascular risks for perioperative complications such as (MI, PE, VFib and 3  AV Block):  No serious cardiac risks  INTERPRETATION: 0 risks: Class I (very low risk - 0.4% complication rate)    The patient has the following additional risks for perioperative complications:  No identified additional risks      ICD-10-CM    1. Preop general physical exam Z01.818    2. Bipolar disorder, in partial remission, most recent episode mixed (H) F31.77    3. Gastroesophageal reflux disease, esophagitis presence not specified K21.9    4. Panic attack F41.0    5. Mixed dyslipidemia E78.2    6. BABATUNDE (obstructive sleep apnea)- moderate (AHI 17) G47.33    7. Prediabetes R73.03    8. Class 1 obesity E66.9    9. Nephrolithiasis N20.0    10. H/O esophageal ulcer Z87.19        RECOMMENDATIONS:     Patient is medically optimized to undergo the planned.  Endoscopic procedure    --Patient is to take all scheduled medications on the day of surgery EXCEPT for modifications listed below.    APPROVAL GIVEN to proceed with proposed procedure, without further diagnostic evaluation       Signed Electronically by: Andre Rod MD    Copy of this evaluation report is provided to requesting physician.    [place] Preop Guidelines    Revised Cardiac Risk Index

## 2019-11-22 ENCOUNTER — ANESTHESIA EVENT (OUTPATIENT)
Dept: SURGERY | Facility: AMBULATORY SURGERY CENTER | Age: 39
End: 2019-11-22

## 2019-11-22 RX ORDER — FENTANYL CITRATE 50 UG/ML
25-50 INJECTION, SOLUTION INTRAMUSCULAR; INTRAVENOUS
Status: CANCELLED | OUTPATIENT
Start: 2019-11-22

## 2019-11-25 ENCOUNTER — ANESTHESIA (OUTPATIENT)
Dept: SURGERY | Facility: AMBULATORY SURGERY CENTER | Age: 39
End: 2019-11-25
Payer: MEDICARE

## 2019-11-25 ENCOUNTER — HOSPITAL ENCOUNTER (OUTPATIENT)
Facility: AMBULATORY SURGERY CENTER | Age: 39
Discharge: HOME | End: 2019-11-25
Attending: INTERNAL MEDICINE | Admitting: INTERNAL MEDICINE
Payer: MEDICARE

## 2019-11-25 VITALS
TEMPERATURE: 98 F | OXYGEN SATURATION: 98 % | RESPIRATION RATE: 16 BRPM | DIASTOLIC BLOOD PRESSURE: 77 MMHG | SYSTOLIC BLOOD PRESSURE: 119 MMHG

## 2019-11-25 VITALS — HEART RATE: 92 BPM

## 2019-11-25 LAB — UPPER GI ENDOSCOPY: NORMAL

## 2019-11-25 PROCEDURE — 43239 EGD BIOPSY SINGLE/MULTIPLE: CPT

## 2019-11-25 PROCEDURE — G8918 PT W/O PREOP ORDER IV AB PRO: HCPCS

## 2019-11-25 PROCEDURE — G8907 PT DOC NO EVENTS ON DISCHARG: HCPCS

## 2019-11-25 RX ORDER — SODIUM CHLORIDE, SODIUM LACTATE, POTASSIUM CHLORIDE, CALCIUM CHLORIDE 600; 310; 30; 20 MG/100ML; MG/100ML; MG/100ML; MG/100ML
INJECTION, SOLUTION INTRAVENOUS CONTINUOUS
Status: DISCONTINUED | OUTPATIENT
Start: 2019-11-25 | End: 2019-11-26 | Stop reason: HOSPADM

## 2019-11-25 RX ORDER — NALOXONE HYDROCHLORIDE 0.4 MG/ML
.1-.4 INJECTION, SOLUTION INTRAMUSCULAR; INTRAVENOUS; SUBCUTANEOUS
Status: DISCONTINUED | OUTPATIENT
Start: 2019-11-25 | End: 2019-11-26 | Stop reason: HOSPADM

## 2019-11-25 RX ORDER — FLUMAZENIL 0.1 MG/ML
0.2 INJECTION, SOLUTION INTRAVENOUS
Status: DISCONTINUED | OUTPATIENT
Start: 2019-11-25 | End: 2019-11-26 | Stop reason: HOSPADM

## 2019-11-25 RX ORDER — ONDANSETRON 2 MG/ML
4 INJECTION INTRAMUSCULAR; INTRAVENOUS
Status: DISCONTINUED | OUTPATIENT
Start: 2019-11-25 | End: 2019-11-26 | Stop reason: HOSPADM

## 2019-11-25 RX ORDER — MEPERIDINE HYDROCHLORIDE 25 MG/ML
12.5 INJECTION INTRAMUSCULAR; INTRAVENOUS; SUBCUTANEOUS
Status: DISCONTINUED | OUTPATIENT
Start: 2019-11-25 | End: 2019-11-26 | Stop reason: HOSPADM

## 2019-11-25 RX ORDER — SODIUM CHLORIDE, SODIUM LACTATE, POTASSIUM CHLORIDE, CALCIUM CHLORIDE 600; 310; 30; 20 MG/100ML; MG/100ML; MG/100ML; MG/100ML
INJECTION, SOLUTION INTRAVENOUS CONTINUOUS PRN
Status: DISCONTINUED | OUTPATIENT
Start: 2019-11-25 | End: 2019-11-25

## 2019-11-25 RX ORDER — ONDANSETRON 4 MG/1
4 TABLET, ORALLY DISINTEGRATING ORAL EVERY 30 MIN PRN
Status: DISCONTINUED | OUTPATIENT
Start: 2019-11-25 | End: 2019-11-26 | Stop reason: HOSPADM

## 2019-11-25 RX ORDER — ONDANSETRON 4 MG/1
4 TABLET, ORALLY DISINTEGRATING ORAL EVERY 6 HOURS PRN
Status: DISCONTINUED | OUTPATIENT
Start: 2019-11-25 | End: 2019-11-26 | Stop reason: HOSPADM

## 2019-11-25 RX ORDER — PROPOFOL 10 MG/ML
INJECTION, EMULSION INTRAVENOUS CONTINUOUS PRN
Status: DISCONTINUED | OUTPATIENT
Start: 2019-11-25 | End: 2019-11-25

## 2019-11-25 RX ORDER — PROPOFOL 10 MG/ML
INJECTION, EMULSION INTRAVENOUS PRN
Status: DISCONTINUED | OUTPATIENT
Start: 2019-11-25 | End: 2019-11-25

## 2019-11-25 RX ORDER — KETAMINE HYDROCHLORIDE 10 MG/ML
INJECTION, SOLUTION INTRAMUSCULAR; INTRAVENOUS PRN
Status: DISCONTINUED | OUTPATIENT
Start: 2019-11-25 | End: 2019-11-25

## 2019-11-25 RX ORDER — LIDOCAINE 40 MG/G
CREAM TOPICAL
Status: DISCONTINUED | OUTPATIENT
Start: 2019-11-25 | End: 2019-11-26 | Stop reason: HOSPADM

## 2019-11-25 RX ORDER — ONDANSETRON 2 MG/ML
4 INJECTION INTRAMUSCULAR; INTRAVENOUS EVERY 30 MIN PRN
Status: DISCONTINUED | OUTPATIENT
Start: 2019-11-25 | End: 2019-11-26 | Stop reason: HOSPADM

## 2019-11-25 RX ORDER — HYDROMORPHONE HYDROCHLORIDE 1 MG/ML
.3-.5 INJECTION, SOLUTION INTRAMUSCULAR; INTRAVENOUS; SUBCUTANEOUS EVERY 10 MIN PRN
Status: DISCONTINUED | OUTPATIENT
Start: 2019-11-25 | End: 2019-11-26 | Stop reason: HOSPADM

## 2019-11-25 RX ORDER — SODIUM CHLORIDE, SODIUM LACTATE, POTASSIUM CHLORIDE, CALCIUM CHLORIDE 600; 310; 30; 20 MG/100ML; MG/100ML; MG/100ML; MG/100ML
500 INJECTION, SOLUTION INTRAVENOUS CONTINUOUS
Status: CANCELLED | OUTPATIENT
Start: 2019-11-25

## 2019-11-25 RX ORDER — ONDANSETRON 2 MG/ML
4 INJECTION INTRAMUSCULAR; INTRAVENOUS EVERY 6 HOURS PRN
Status: DISCONTINUED | OUTPATIENT
Start: 2019-11-25 | End: 2019-11-26 | Stop reason: HOSPADM

## 2019-11-25 RX ORDER — OXYCODONE HYDROCHLORIDE 5 MG/1
5 TABLET ORAL EVERY 4 HOURS PRN
Status: DISCONTINUED | OUTPATIENT
Start: 2019-11-25 | End: 2019-11-26 | Stop reason: HOSPADM

## 2019-11-25 RX ADMIN — PROPOFOL 50 MG: 10 INJECTION, EMULSION INTRAVENOUS at 13:52

## 2019-11-25 RX ADMIN — KETAMINE HYDROCHLORIDE 15 MG: 10 INJECTION, SOLUTION INTRAMUSCULAR; INTRAVENOUS at 13:48

## 2019-11-25 RX ADMIN — PROPOFOL 150 MCG/KG/MIN: 10 INJECTION, EMULSION INTRAVENOUS at 13:49

## 2019-11-25 RX ADMIN — PROPOFOL 50 MG: 10 INJECTION, EMULSION INTRAVENOUS at 13:50

## 2019-11-25 RX ADMIN — PROPOFOL 50 MG: 10 INJECTION, EMULSION INTRAVENOUS at 13:48

## 2019-11-25 RX ADMIN — PROPOFOL 100 MG: 10 INJECTION, EMULSION INTRAVENOUS at 13:49

## 2019-11-25 RX ADMIN — SODIUM CHLORIDE, SODIUM LACTATE, POTASSIUM CHLORIDE, CALCIUM CHLORIDE: 600; 310; 30; 20 INJECTION, SOLUTION INTRAVENOUS at 13:47

## 2019-11-25 NOTE — ANESTHESIA PREPROCEDURE EVALUATION
Anesthesia Pre-Procedure Evaluation    Patient: Rafa Cochran   MRN:     [id number] Gender:   male   Age:    39 year old :      1980        Preoperative Diagnosis: Esophageal reflux [K21.9]   Procedure(s):  ESOPHAGOGASTRODUODENOSCOPY, WITH CO2 INSUFFLATION     Past Medical History:   Diagnosis Date     Diabetes mellitus (H)      H/O esophageal ulcer 2013     Nephrolithiasis 2009     Ulcer       Past Surgical History:   Procedure Laterality Date     APPENDECTOMY            Anesthesia Evaluation     .             ROS/MED HX    ENT/Pulmonary:  - neg pulmonary ROS   (+)sleep apnea, , . .    Neurologic:  - neg neurologic ROS     Cardiovascular:  - neg cardiovascular ROS       METS/Exercise Tolerance:     Hematologic:  - neg hematologic  ROS       Musculoskeletal:  - neg musculoskeletal ROS       GI/Hepatic:  - neg GI/hepatic ROS   (+) GERD       Renal/Genitourinary:  - ROS Renal section negative   (+) Nephrolithiasis ,       Endo:  - neg endo ROS   (+) type II DM Obesity, .      Psychiatric:  - neg psychiatric ROS   (+) psychiatric history schizophrenia and bipolar      Infectious Disease:  - neg infectious disease ROS       Malignancy:      - no malignancy   Other:    - neg other ROS                     PHYSICAL EXAM:   Mental Status/Neuro: A/A/O   Airway: Facies: Feasible  Mallampati: I  Mouth/Opening: Full  TM distance: > 6 cm  Neck ROM: Full   Respiratory: Auscultation: CTAB     Resp. Rate: Normal     Resp. Effort: Normal      CV: Rhythm: Regular  Rate: Age appropriate  Heart: Normal Sounds  Edema: None   Comments:      Dental: Normal Dentition                LABS:  CBC:   Lab Results   Component Value Date    WBC 7.7 2019    WBC 7.3 2018    HGB 15.2 2019    HGB 14.7 2018    HCT 43.3 2019    HCT 42.6 2018     2019     2018     BMP:   Lab Results   Component Value Date     2019     2018    POTASSIUM 4.3 2019    POTASSIUM  "4.9 01/04/2018    CHLORIDE 108 03/12/2019    CHLORIDE 106 01/04/2018    CO2 23 03/12/2019    CO2 28 01/04/2018    BUN 14 03/12/2019    BUN 17 01/04/2018    CR 0.82 03/12/2019    CR 1.01 01/04/2018     (H) 03/12/2019     (H) 01/04/2018     COAGS: No results found for: PTT, INR, FIBR  POC: No results found for: BGM, HCG, HCGS  OTHER:   Lab Results   Component Value Date    A1C ERROR PATIENT NOT AVAILABLE FOR RETEST 12/23/2014    SOLEDAD 9.6 03/12/2019    ALBUMIN 4.2 01/04/2018    PROTTOTAL 7.9 01/04/2018    ALT 41 01/04/2018    AST 23 01/04/2018    ALKPHOS 78 01/04/2018    BILITOTAL 0.2 01/04/2018    TSH 0.94 01/04/2018    CRP <5.0 08/07/2013        Preop Vitals    BP Readings from Last 3 Encounters:   11/25/19 125/73   11/19/19 136/76   11/11/19 122/84    Pulse Readings from Last 3 Encounters:   11/19/19 74   11/11/19 98   10/22/19 94      Resp Readings from Last 3 Encounters:   11/25/19 16   07/12/19 22   03/12/19 20    SpO2 Readings from Last 3 Encounters:   11/25/19 98%   11/19/19 97%   10/22/19 97%      Temp Readings from Last 1 Encounters:   11/25/19 36.7  C (98  F) (Temporal)    Ht Readings from Last 1 Encounters:   11/19/19 1.905 m (6' 3\")      Wt Readings from Last 1 Encounters:   11/19/19 123.4 kg (272 lb 1.6 oz)    Estimated body mass index is 34.01 kg/m  as calculated from the following:    Height as of 11/19/19: 1.905 m (6' 3\").    Weight as of 11/19/19: 123.4 kg (272 lb 1.6 oz).     LDA:  Peripheral IV 11/25/19 Right Upper arm (Active)   Site Assessment WDL 11/25/2019  1:33 PM   Line Status Saline locked 11/25/2019  1:33 PM   Phlebitis Scale 0-->no symptoms 11/25/2019  1:33 PM   Infiltration Scale 0 11/25/2019  1:33 PM   Dressing Intervention New dressing  11/25/2019  1:33 PM   Number of days: 0        Assessment:   ASA SCORE: 3    H&P: History and physical reviewed and following examination; no interval change.   Smoking Status:  Non-Smoker/Unknown   NPO Status: NPO Appropriate     Plan: "   Anes. Type:  MAC   Pre-Medication: None   Induction:  N/a   Airway: Native Airway   Access/Monitoring: PIV   Maintenance: Propofol Sedation     Postop Plan:   Postop Pain: None  Postop Sedation/Airway: Not planned  Disposition: Outpatient     PONV Management:   Adult Risk Factors:, Non-Smoker   Prevention: Ondansetron, Propofol     CONSENT: Direct conversation   Plan and risks discussed with: Patient   Blood Products: Consent Deferred (Minimal Blood Loss)                   Kyle Persaud MD

## 2019-11-25 NOTE — ANESTHESIA CARE TRANSFER NOTE
Patient: Rafa Cochran    Procedure(s):  ESOPHAGOGASTRODUODENOSCOPY, WITH CO2 INSUFFLATION  Esophagogastroduodenoscopy, With Biopsy    Diagnosis: Esophageal reflux [K21.9]  Diagnosis Additional Information: No value filed.    Anesthesia Type:   No value filed.     Note:  Airway :Room Air  Patient transferred to:Phase II  Comments: To Phase II. Report to RN.  VSS Resp status stable.Handoff Report: Identifed the Patient, Identified the Reponsible Provider, Reviewed the pertinent medical history, Discussed the surgical course, Reviewed Intra-OP anesthesia mangement and issues during anesthesia, Set expectations for post-procedure period and Allowed opportunity for questions and acknowledgement of understanding      Vitals: (Last set prior to Anesthesia Care Transfer)    CRNA VITALS  11/25/2019 1337 - 11/25/2019 1412      11/25/2019             Pulse:  (!) 0                Electronically Signed By: NAS Waite CRNA  November 25, 2019  2:12 PM

## 2019-11-25 NOTE — ANESTHESIA POSTPROCEDURE EVALUATION
Anesthesia POST Procedure Evaluation    Patient: Rafa Cochran   MRN:     [id number] Gender:   male   Age:    39 year old :      1980        Preoperative Diagnosis: Esophageal reflux [K21.9]   Procedure(s):  ESOPHAGOGASTRODUODENOSCOPY, WITH CO2 INSUFFLATION  Esophagogastroduodenoscopy, With Biopsy   Postop Comments: No value filed.       Anesthesia Type:  Not documented  No value filed.    Reportable Event: NO     PAIN: Uncomplicated   Sign Out status: Comfortable, Well controlled pain     PONV: No PONV   Sign Out status:  No Nausea or Vomiting     Neuro/Psych: Uneventful perioperative course   Sign Out Status: Preoperative baseline; Age appropriate mentation     Airway/Resp.: Uneventful perioperative course   Sign Out Status: Non labored breathing, age appropriate RR; Resp. Status within EXPECTED Parameters     CV: Uneventful perioperative course   Sign Out status: Appropriate BP and perfusion indices; Appropriate HR/Rhythm     Disposition:   Sign Out in:  PACU  Disposition:  Phase II; Home  Recovery Course: Uneventful  Follow-Up: Not required           Last Anesthesia Record Vitals:  CRNA VITALS  2019 1337 - 2019 1437      2019             Pulse:  (!) 0          Last PACU Vitals:  Vitals Value Taken Time   BP     Temp     Pulse 92 2019  2:00 PM   Resp     SpO2     Temp src     NIBP 128/66 2019  2:00 PM   Pulse 0 2019  2:06 PM   SpO2 96 % 2019  2:05 PM   Resp     Temp 36  C (96.8  F) 2019  2:00 PM   Ht Rate 95 2019  2:05 PM   Temp 2           Electronically Signed By: Kyle Persaud MD, 2019, 3:25 PM

## 2019-11-25 NOTE — DISCHARGE INSTRUCTIONS
[facility]  Same-Day Surgery   Adult Discharge Orders & Instructions   For 24 hours after surgery  1. Get plenty of rest.  A responsible adult must stay with you for at least 24 hours after you leave the hospital.   2. Do not drive or use heavy equipment.  If you have weakness or tingling, don't drive or use heavy equipment until this feeling goes away.  3. Do not drink alcohol.  4. Avoid strenuous or risky activities.  Ask for help when climbing stairs.   5. You may feel lightheaded.  IF so, sit for a few minutes before standing.  Have someone help you get up.   6. If you have nausea (feel sick to your stomach): Drink only clear liquids such as apple juice, ginger ale, broth or 7-Up.  Rest may also help.  Be sure to drink enough fluids.  Move to a regular diet as you feel able.  7. You may have a slight fever. Call the doctor if your fever is over 100 F (37.7 C) (taken under the tongue) or lasts longer than 24 hours.  8. You may have a dry mouth, a sore throat, muscle aches or trouble sleeping.  These should go away after 24 hours.  9. Do not make important or legal decisions.   Call your doctor for any of the followin.  Signs of infection (fever, growing tenderness at the surgery site, a large amount of drainage or bleeding, severe pain, foul-smelling drainage, redness, swelling).    2. It has been over 8 to 10 hours since surgery and you are still not able to urinate (pass water).    3.  Headache for over 24 hours.

## 2019-11-27 LAB — COPATH REPORT: NORMAL

## 2020-01-16 ENCOUNTER — OFFICE VISIT (OUTPATIENT)
Dept: FAMILY MEDICINE | Facility: CLINIC | Age: 40
End: 2020-01-16
Payer: MEDICARE

## 2020-01-16 VITALS
BODY MASS INDEX: 35.56 KG/M2 | HEIGHT: 75 IN | WEIGHT: 286 LBS | TEMPERATURE: 98.7 F | HEART RATE: 101 BPM | RESPIRATION RATE: 21 BRPM | SYSTOLIC BLOOD PRESSURE: 136 MMHG | DIASTOLIC BLOOD PRESSURE: 75 MMHG | OXYGEN SATURATION: 97 %

## 2020-01-16 DIAGNOSIS — J01.90 ACUTE NON-RECURRENT SINUSITIS, UNSPECIFIED LOCATION: Primary | ICD-10-CM

## 2020-01-16 PROCEDURE — 99213 OFFICE O/P EST LOW 20 MIN: CPT | Performed by: INTERNAL MEDICINE

## 2020-01-16 RX ORDER — CETIRIZINE HYDROCHLORIDE 10 MG/1
10 TABLET ORAL DAILY
Qty: 7 TABLET | Refills: 0 | Status: SHIPPED | OUTPATIENT
Start: 2020-01-16 | End: 2020-05-22

## 2020-01-16 RX ORDER — AMOXICILLIN 500 MG/1
500 CAPSULE ORAL 3 TIMES DAILY
Qty: 7 CAPSULE | Refills: 0 | Status: SHIPPED | OUTPATIENT
Start: 2020-01-16 | End: 2020-02-27

## 2020-01-16 ASSESSMENT — ENCOUNTER SYMPTOMS
ARTHRALGIAS: 0
DIARRHEA: 0
HEADACHES: 0
NAUSEA: 0
PALPITATIONS: 0
WEAKNESS: 0
DYSURIA: 0
SORE THROAT: 0
HEMATOCHEZIA: 0
FREQUENCY: 0
DIZZINESS: 0
PARESTHESIAS: 0
COUGH: 0
HEMATURIA: 0
CHILLS: 0
MYALGIAS: 0
HEARTBURN: 0
EYE PAIN: 0
SHORTNESS OF BREATH: 0
JOINT SWELLING: 0
FEVER: 0
NERVOUS/ANXIOUS: 0
CONSTIPATION: 0
ABDOMINAL PAIN: 0

## 2020-01-16 ASSESSMENT — ANXIETY QUESTIONNAIRES
2. NOT BEING ABLE TO STOP OR CONTROL WORRYING: NEARLY EVERY DAY
GAD7 TOTAL SCORE: 21
1. FEELING NERVOUS, ANXIOUS, OR ON EDGE: NEARLY EVERY DAY
5. BEING SO RESTLESS THAT IT IS HARD TO SIT STILL: NEARLY EVERY DAY
6. BECOMING EASILY ANNOYED OR IRRITABLE: NEARLY EVERY DAY
7. FEELING AFRAID AS IF SOMETHING AWFUL MIGHT HAPPEN: NEARLY EVERY DAY
IF YOU CHECKED OFF ANY PROBLEMS ON THIS QUESTIONNAIRE, HOW DIFFICULT HAVE THESE PROBLEMS MADE IT FOR YOU TO DO YOUR WORK, TAKE CARE OF THINGS AT HOME, OR GET ALONG WITH OTHER PEOPLE: EXTREMELY DIFFICULT
3. WORRYING TOO MUCH ABOUT DIFFERENT THINGS: NEARLY EVERY DAY

## 2020-01-16 ASSESSMENT — PAIN SCALES - GENERAL: PAINLEVEL: NO PAIN (0)

## 2020-01-16 ASSESSMENT — MIFFLIN-ST. JEOR: SCORE: 2307.92

## 2020-01-16 ASSESSMENT — PATIENT HEALTH QUESTIONNAIRE - PHQ9
5. POOR APPETITE OR OVEREATING: NEARLY EVERY DAY
SUM OF ALL RESPONSES TO PHQ QUESTIONS 1-9: 8

## 2020-01-16 NOTE — PATIENT INSTRUCTIONS
Patient Education     Acute Bacterial Rhinosinusitis (ABRS)    Acute bacterial rhinosinusitis (ABRS) is an infection of your nasal cavity and sinuses. It s caused by bacteria. Acute means that you ve had symptoms for less than 4 weeks, but possibly up to 12 weeks.  Understanding your sinuses  The nasal cavity is the large air-filled space behind your nose. The sinuses are a group of spaces formed by the bones of your face. They connect with your nasal cavity. ABRS causes the tissue lining these spaces to become inflamed. Mucus may not drain normally. This leads to facial pain and other symptoms.  What causes ABRS?  ABRS most often follows an upper respiratory infection caused by a virus. Bacteria then infect the lining of your nasal cavity and sinuses. But you can also get ABRS if you have:    Nasal allergies    Long-term nasal swelling and congestion not caused by allergies    Blockage in the nose  Symptoms of ABRS  The symptoms of ABRS may be different for each person and include:    Nasal congestion or blockage    Pain or pressure in the face    Thick, colored drainage from the nose  Other symptoms may include:    Runny nose    Fluid draining from the nose down the throat (postnasal drip)    Headache    Cough    Pain    Fever  Diagnosing ABRS  ABRS may be diagnosed if you ve had an upper respiratory infection like a cold and cough for 10 or more days without improvement or with worsening symptoms. Your healthcare provider will ask about your symptoms and your medical history. The provider will check your vital signs, including your temperature. You ll have a physical exam. The healthcare provider will check your ears, nose, and throat. You likely won t need any tests. If ABRS comes back, you may have a culture or other tests.  Treatment for ABRS  Treatment may include:    Antibiotic medicine. This is for symptoms that last for at least 10 to 14 days.    Nasal corticosteroid medicine. Drops or spray used in the  nose can lessen swelling and congestion.    Over-the-counter pain medicine. This is to lessen sinus pain and pressure.    Nasal decongestant medicine. Spray or drops may help to lessen congestion. Do not use them for more than a few days.    Salt wash (saline irrigation). This can help to loosen mucus.  Possible complications of ABRS  ABRS may come back or become long-term (chronic). In rare cases, ABRS may cause complications such as:     Inflamed tissue around the brain and spinal cord (meningitis)    Inflamed tissue around the eyes (orbital cellulitis)    Inflamed bones around the sinuses (osteitis)  These problems may need to be treated in a hospital with intravenous (IV) antibiotic medicine or surgery.  When to call the healthcare provider  Call your healthcare provider if you have any of the following:    Symptoms that don t get better, or get worse    Symptoms that don t get better after 3 to 5 days on antibiotics    Trouble seeing    Swelling around your eyes    Confusion or trouble staying awake   Date Last Reviewed: 5/1/2017 2000-2019 [employer]. [address] [place] [address], [place] [address]. All rights reserved. This information is not intended as a substitute for professional medical care. Always follow your healthcare professional's instructions.

## 2020-01-16 NOTE — PROGRESS NOTES
Subjective   Here today with complaints of nasal congestion/sinus pain/ears popping  Scratchy throat  No odynophagia  No fever  Also saw that there is some pink discharge from the nose  Initially had some yellowish discharge from the nose  Rafa Cochran is a 39 year old male who presents to clinic today for the following health issues:    HPI   Acute Illness   Acute illness concerns: SINUS  Onset: 4-5 DAYS    Fever: no    Chills/Sweats: YES    Headache (location?): no     Sinus Pressure:YES    Conjunctivitis:  no    Ear Pain: YES: bilateral    Rhinorrhea: YES    Congestion: YES    Sore Throat: mild     Cough: YES-non-productive    Wheeze: YES    Decreased Appetite: no    Nausea: no    Vomiting: no    Diarrhea:  no    Dysuria/Freq.: no    Fatigue/Achiness: YES- fatigue    Sick/Strep Exposure: YES     Therapies Tried and outcome: none      Patient Active Problem List   Diagnosis     Major depression in partial remission (H)     Bipolar affective disorder (H)     OCD (obsessive compulsive disorder)     Schizoaffective disorder (H)     Sinus tachycardia     Palpitations     Prediabetes     Panic attack     BABATUNDE (obstructive sleep apnea)- moderate (AHI 17)     Pain in joint of right shoulder     Sprain and strain of shoulder and upper arm, left, initial encounter     Shoulder pain, left     Hyperglycemia     Morbid obesity due to GERD (H)     Gastroesophageal reflux disease, esophagitis presence not specified     Family history of cardiac arrhythmia     Mixed dyslipidemia     Nephrolithiasis     H/O esophageal ulcer     Past Surgical History:   Procedure Laterality Date     APPENDECTOMY       COMBINED ESOPHAGOSCOPY, GASTROSCOPY, DUODENOSCOPY (EGD) WITH CO2 INSUFFLATION N/A 11/25/2019    Procedure: ESOPHAGOGASTRODUODENOSCOPY, WITH CO2 INSUFFLATION;  Surgeon: Sabas Grajeda MD;  Location:  OR     ESOPHAGOSCOPY, GASTROSCOPY, DUODENOSCOPY (EGD), COMBINED N/A 11/25/2019    Procedure: Esophagogastroduodenoscopy,  With Biopsy;  Surgeon: Sabas Grajeda MD;  Location: [facility] OR       Social History     Tobacco Use     Smoking status: Former Smoker     Last attempt to quit: 2013     Years since quittin.0     Smokeless tobacco: Never Used     Tobacco comment: 5 cigs per day   Substance Use Topics     Alcohol use: Yes     Alcohol/week: 0.0 standard drinks     Comment: rare use     Family History   Problem Relation Age of Onset     C.A.D. Maternal Grandfather      Diabetes Maternal Grandfather      Hypertension Maternal Grandfather      Heart Disease Maternal Grandfather      C.A.D. Paternal Grandmother      Diabetes Paternal Grandmother      Circulatory Paternal Grandmother      Gastrointestinal Disease Paternal Grandmother      Hypertension Paternal Grandfather      Circulatory Paternal Grandfather      Heart Disease Paternal Grandfather      Gastrointestinal Disease Paternal Grandfather      Heart Failure Paternal Grandfather      Hypertension Father      Alcohol/Drug Father      Allergies Father      Depression Father      Gastrointestinal Disease Father      Prostate Cancer Father      Cerebrovascular Disease Maternal Grandmother      Arthritis Maternal Grandmother      Heart Disease Maternal Grandmother      Alcohol/Drug Mother      Allergies Mother      Circulatory Mother      Depression Mother      Heart Disease Mother      Diabetes Mother      Alcohol/Drug Sister      Allergies Sister      Depression Sister      Genitourinary Problems Sister              Reviewed and updated as needed this visit by Provider         Review of Systems   Constitutional: Negative for chills and fever.   HENT: Positive for congestion and ear pain. Negative for hearing loss and sore throat.         Tender on palpation of the maxillary sinuses   Eyes: Negative for pain and visual disturbance.   Respiratory: Negative for cough and shortness of breath.    Cardiovascular: Negative for chest pain, palpitations and peripheral edema.    Gastrointestinal: Negative for abdominal pain, constipation, diarrhea, heartburn, hematochezia and nausea.   Genitourinary: Negative for discharge, dysuria, frequency, genital sores, hematuria, impotence and urgency.   Musculoskeletal: Negative for arthralgias, joint swelling and myalgias.   Skin: Negative for rash.   Neurological: Negative for dizziness, weakness, headaches and paresthesias.   Psychiatric/Behavioral: Negative for mood changes. The patient is not nervous/anxious.             Objective    There were no vitals taken for this visit.  There is no height or weight on file to calculate BMI.  Physical Exam  Vitals signs reviewed.   HENT:      Head: Normocephalic and atraumatic.      Comments: Tender on palpation of maxillary sinuses  Eyes:      Conjunctiva/sclera: Conjunctivae normal.      Pupils: Pupils are equal, round, and reactive to light.   Neck:      Musculoskeletal: Normal range of motion and neck supple.   Cardiovascular:      Rate and Rhythm: Normal rate and regular rhythm.   Pulmonary:      Effort: Pulmonary effort is normal.   Skin:     General: Skin is warm.   Neurological:      General: No focal deficit present.      Mental Status: He is alert and oriented to person, place, and time.   Psychiatric:         Mood and Affect: Mood normal.         Behavior: Behavior normal.            No results found for this or any previous visit (from the past 24 hour(s)).        Assessment & Plan     1. Acute non-recurrent sinusitis, unspecified location  He has nasal congestion/headache  He also has blocked ears  There is some tenderness over the maxillary sinuses  Consistent with sinusitis  - amoxicillin (AMOXIL) 500 MG capsule; Take 1 capsule (500 mg) by mouth 3 times daily  Dispense: 7 capsule; Refill: 0  - cetirizine (ZYRTEC) 10 MG tablet; Take 1 tablet (10 mg) by mouth daily for 7 days  Dispense: 7 tablet; Refill: 0           Return in about 4 weeks (around 2/13/2020).    Harrison Salazar MD  [place]  [facility]

## 2020-01-17 ASSESSMENT — ANXIETY QUESTIONNAIRES: GAD7 TOTAL SCORE: 21

## 2020-02-17 ENCOUNTER — NURSE TRIAGE (OUTPATIENT)
Dept: NURSING | Facility: CLINIC | Age: 40
End: 2020-02-17

## 2020-02-17 NOTE — TELEPHONE ENCOUNTER
"  Call from pt       CC:  Chest pain     Was just on treadmill walking and something caught his interest and he started laughing  - then started to develop pain / discomfort / aching on the R arm / R shoulder and into the chest area         \"Elbow is throbbing\"    \"Hand is numb and tingling\"     Sounds clear on the phone - \"flat\" tone of voice noted       A/P:   > Would suggest ED eval with these sx - did note recent ED eval about 4 wk ago        Nick hung rn                 Reason for Disposition    Pain also present in shoulder(s) or arm(s) or jaw    Additional Information    Negative: Severe difficulty breathing (e.g., struggling for each breath, speaks in single words)    Negative: Passed out (i.e., fainted, collapsed and was not responding)    Negative: Chest pain lasting longer than 5 minutes and ANY of the following:* Over 50 years old* Over 30 years old and at least one cardiac risk factor (i.e., high blood pressure, diabetes, high cholesterol, obesity, smoker or strong family history of heart disease)* Pain is crushing, pressure-like, or heavy * Took nitroglycerin and chest pain was not relieved* History of heart disease (i.e., angina, heart attack, bypass surgery, angioplasty, CHF)    Negative: Visible sweat on face or sweat dripping down face    Negative: Sounds like a life-threatening emergency to the triager    Negative: Followed an injury to chest    Negative: SEVERE chest pain    Protocols used: CHEST PAIN-A-OH      "

## 2020-02-27 ENCOUNTER — OFFICE VISIT (OUTPATIENT)
Dept: FAMILY MEDICINE | Facility: CLINIC | Age: 40
End: 2020-02-27
Payer: MEDICARE

## 2020-02-27 VITALS
DIASTOLIC BLOOD PRESSURE: 88 MMHG | HEIGHT: 73 IN | BODY MASS INDEX: 37.14 KG/M2 | WEIGHT: 280.2 LBS | TEMPERATURE: 98 F | HEART RATE: 98 BPM | SYSTOLIC BLOOD PRESSURE: 132 MMHG | OXYGEN SATURATION: 99 %

## 2020-02-27 DIAGNOSIS — R20.2 PARESTHESIA: Primary | ICD-10-CM

## 2020-02-27 DIAGNOSIS — E78.2 MIXED HYPERLIPIDEMIA: ICD-10-CM

## 2020-02-27 DIAGNOSIS — R29.898 WEAKNESS OF BOTH UPPER EXTREMITIES: ICD-10-CM

## 2020-02-27 LAB
ALBUMIN SERPL-MCNC: 4.3 G/DL (ref 3.4–5)
ALP SERPL-CCNC: 106 U/L (ref 40–150)
ALT SERPL W P-5'-P-CCNC: 29 U/L (ref 0–70)
ANION GAP SERPL CALCULATED.3IONS-SCNC: 8 MMOL/L (ref 3–14)
AST SERPL W P-5'-P-CCNC: 17 U/L (ref 0–45)
BASOPHILS # BLD AUTO: 0.1 10E9/L (ref 0–0.2)
BASOPHILS NFR BLD AUTO: 0.6 %
BILIRUB SERPL-MCNC: 0.3 MG/DL (ref 0.2–1.3)
BUN SERPL-MCNC: 9 MG/DL (ref 7–30)
CALCIUM SERPL-MCNC: 9.7 MG/DL (ref 8.5–10.1)
CHLORIDE SERPL-SCNC: 107 MMOL/L (ref 94–109)
CHOLEST SERPL-MCNC: 260 MG/DL
CO2 SERPL-SCNC: 25 MMOL/L (ref 20–32)
CREAT SERPL-MCNC: 0.81 MG/DL (ref 0.66–1.25)
CRP SERPL-MCNC: 3.8 MG/L (ref 0–8)
DIFFERENTIAL METHOD BLD: NORMAL
EOSINOPHIL # BLD AUTO: 0.3 10E9/L (ref 0–0.7)
EOSINOPHIL NFR BLD AUTO: 3.7 %
ERYTHROCYTE [DISTWIDTH] IN BLOOD BY AUTOMATED COUNT: 12.3 % (ref 10–15)
ERYTHROCYTE [SEDIMENTATION RATE] IN BLOOD BY WESTERGREN METHOD: 10 MM/H (ref 0–15)
GFR SERPL CREATININE-BSD FRML MDRD: >90 ML/MIN/{1.73_M2}
GLUCOSE SERPL-MCNC: 105 MG/DL (ref 70–99)
HCT VFR BLD AUTO: 44.3 % (ref 40–53)
HDLC SERPL-MCNC: 48 MG/DL
HGB BLD-MCNC: 14.9 G/DL (ref 13.3–17.7)
LDLC SERPL CALC-MCNC: ABNORMAL MG/DL
LDLC SERPL DIRECT ASSAY-MCNC: 186 MG/DL
LYMPHOCYTES # BLD AUTO: 3.5 10E9/L (ref 0.8–5.3)
LYMPHOCYTES NFR BLD AUTO: 41.6 %
MCH RBC QN AUTO: 28.6 PG (ref 26.5–33)
MCHC RBC AUTO-ENTMCNC: 33.6 G/DL (ref 31.5–36.5)
MCV RBC AUTO: 85 FL (ref 78–100)
MONOCYTES # BLD AUTO: 0.7 10E9/L (ref 0–1.3)
MONOCYTES NFR BLD AUTO: 7.9 %
NEUTROPHILS # BLD AUTO: 3.9 10E9/L (ref 1.6–8.3)
NEUTROPHILS NFR BLD AUTO: 46.2 %
NONHDLC SERPL-MCNC: 212 MG/DL
PLATELET # BLD AUTO: 348 10E9/L (ref 150–450)
POTASSIUM SERPL-SCNC: 4 MMOL/L (ref 3.4–5.3)
PROT SERPL-MCNC: 8.2 G/DL (ref 6.8–8.8)
RBC # BLD AUTO: 5.21 10E12/L (ref 4.4–5.9)
SODIUM SERPL-SCNC: 140 MMOL/L (ref 133–144)
TRIGL SERPL-MCNC: 434 MG/DL
TSH SERPL DL<=0.005 MIU/L-ACNC: 1 MU/L (ref 0.4–4)
WBC # BLD AUTO: 8.4 10E9/L (ref 4–11)

## 2020-02-27 PROCEDURE — 85652 RBC SED RATE AUTOMATED: CPT | Performed by: NURSE PRACTITIONER

## 2020-02-27 PROCEDURE — 86038 ANTINUCLEAR ANTIBODIES: CPT | Performed by: NURSE PRACTITIONER

## 2020-02-27 PROCEDURE — 86140 C-REACTIVE PROTEIN: CPT | Performed by: NURSE PRACTITIONER

## 2020-02-27 PROCEDURE — 83721 ASSAY OF BLOOD LIPOPROTEIN: CPT | Mod: 59 | Performed by: NURSE PRACTITIONER

## 2020-02-27 PROCEDURE — 99214 OFFICE O/P EST MOD 30 MIN: CPT | Performed by: NURSE PRACTITIONER

## 2020-02-27 PROCEDURE — 80061 LIPID PANEL: CPT | Performed by: NURSE PRACTITIONER

## 2020-02-27 PROCEDURE — 80050 GENERAL HEALTH PANEL: CPT | Performed by: NURSE PRACTITIONER

## 2020-02-27 PROCEDURE — 36415 COLL VENOUS BLD VENIPUNCTURE: CPT | Performed by: NURSE PRACTITIONER

## 2020-02-27 PROCEDURE — 86431 RHEUMATOID FACTOR QUANT: CPT | Performed by: NURSE PRACTITIONER

## 2020-02-27 ASSESSMENT — MIFFLIN-ST. JEOR: SCORE: 2244.86

## 2020-02-27 ASSESSMENT — PAIN SCALES - GENERAL: PAINLEVEL: NO PAIN (0)

## 2020-02-27 NOTE — PATIENT INSTRUCTIONS
Labwork as we discussed to look for inflammatory disorder.      See attached for neurology referral as well.  Do call for that appointment and in meantime, we will follow up on lab results.

## 2020-02-27 NOTE — PROGRESS NOTES
"Subjective     Rafa Cochran is a 40 year old male who presents to clinic today for the following health issues:    HPI   ED/UC Followup:    Facility:  [facility]  Date of visit: 2/17/20  Reason for visit: chest pain that radiated down left arm to hand, tingling/pins and needles  Current Status: gotten better last couple of days   Of note, normal MRA on 3/29/19    In emergency room on 2/17/20- EKG, chest xray, trops normal    Some elbow pain + pins and needles in lower left arm  Wakes with pins and needles in hands and feet, can intensify throughout day with clenching feeling in upper torso which leads to dyspnea  Also has panic disorder but states this is different than panic attacks    Also has weakness during the \"attacks\" lays on the floor with dizziness/lightheadedness  Attacks last 1-1.5 hours and happen about 8 times a month  Does have flashes in sides of eyes and some darkness in vision during these \"attacks\"  They are preceeded by poor sleep but nothing else associated with it  Exercises- does weights and pushups but more so walks on treadmill 5 times a day. Hasn't felt up to the weights recently due to fatigue. Fatigue started March 2018 with the start of all these symptoms.     Graduated May 2019 from grad school.  Starting job soon but has not worked since graduating as he has been trying to figure out his health problems  Sleeps 9 hours a night  Does not use alcohol or drugs  He is decreasing ativan with psychiatry x 4 months but symptoms started prior to weaning process    Patient Active Problem List   Diagnosis     Major depression in partial remission (H)     Bipolar affective disorder (H)     OCD (obsessive compulsive disorder)     Schizoaffective disorder (H)     Sinus tachycardia     Palpitations     Prediabetes     Panic attack     BABATUNDE (obstructive sleep apnea)- moderate (AHI 17)     Pain in joint of right shoulder     Sprain and strain of shoulder and upper arm, left, initial " encounter     Shoulder pain, left     Hyperglycemia     Morbid obesity due to GERD (H)     Gastroesophageal reflux disease, esophagitis presence not specified     Family history of cardiac arrhythmia     Mixed dyslipidemia     Nephrolithiasis     H/O esophageal ulcer     Past Surgical History:   Procedure Laterality Date     APPENDECTOMY       COMBINED ESOPHAGOSCOPY, GASTROSCOPY, DUODENOSCOPY (EGD) WITH CO2 INSUFFLATION N/A 2019    Procedure: ESOPHAGOGASTRODUODENOSCOPY, WITH CO2 INSUFFLATION;  Surgeon: Sabas Grajeda MD;  Location: [facility] OR     ESOPHAGOSCOPY, GASTROSCOPY, DUODENOSCOPY (EGD), COMBINED N/A 2019    Procedure: Esophagogastroduodenoscopy, With Biopsy;  Surgeon: Sabas Grajeda MD;  Location: [facility] OR       Social History     Tobacco Use     Smoking status: Former Smoker     Last attempt to quit: 2013     Years since quittin.1     Smokeless tobacco: Never Used     Tobacco comment: 5 cigs per day   Substance Use Topics     Alcohol use: Yes     Alcohol/week: 0.0 standard drinks     Comment: rare use     Family History   Problem Relation Age of Onset     C.A.D. Maternal Grandfather      Diabetes Maternal Grandfather      Hypertension Maternal Grandfather      Heart Disease Maternal Grandfather      C.A.D. Paternal Grandmother      Diabetes Paternal Grandmother      Circulatory Paternal Grandmother      Gastrointestinal Disease Paternal Grandmother      Hypertension Paternal Grandfather      Circulatory Paternal Grandfather      Heart Disease Paternal Grandfather      Gastrointestinal Disease Paternal Grandfather      Heart Failure Paternal Grandfather      Hypertension Father      Alcohol/Drug Father      Allergies Father      Depression Father      Gastrointestinal Disease Father      Prostate Cancer Father      Cerebrovascular Disease Maternal Grandmother      Arthritis Maternal Grandmother      Heart Disease Maternal Grandmother      Alcohol/Drug Mother       "Allergies Mother      Circulatory Mother      Depression Mother      Heart Disease Mother      Diabetes Mother      Alcohol/Drug Sister      Allergies Sister      Depression Sister      Genitourinary Problems Sister          Current Outpatient Medications   Medication Sig Dispense Refill     lorazepam (ATIVAN) 2 MG tablet Take 2 mg by mouth 3 times daily as needed Patient taking 3 mg in the morning, 2 mg in the afternoon and 2 mg in the evening. 30 tablet 0     mirtazapine (REMERON) 15 MG tablet 15 mg Patient taking 1.5 tablets at bedtime  2     omeprazole (PRILOSEC) 20 MG DR capsule Take 1 capsule (20 mg) by mouth daily 30 capsule 5     ORDER FOR DME autoCPAP 8cm       Allergies   Allergen Reactions     Nicotine Rash     Nicotine Patches     Pertussis Toxoid      Bad reaction as a child     BP Readings from Last 3 Encounters:   02/27/20 132/88   01/16/20 136/75   11/25/19 119/77    Wt Readings from Last 3 Encounters:   02/27/20 127.1 kg (280 lb 3.2 oz)   01/16/20 129.7 kg (286 lb)   11/19/19 123.4 kg (272 lb 1.6 oz)                    Reviewed and updated as needed this visit by Provider         Review of Systems   ROS COMP: Constitutional, HEENT, cardiovascular, pulmonary, GI, , musculoskeletal, neuro, skin, endocrine and psych systems are negative, except as otherwise noted.      Objective    /88 (BP Location: Left arm, Patient Position: Chair, Cuff Size: Adult Large)   Pulse 98   Temp 98  F (36.7  C) (Oral)   Ht 1.854 m (6' 1\")   Wt 127.1 kg (280 lb 3.2 oz)   SpO2 99%   BMI 36.97 kg/m    Body mass index is 36.97 kg/m .  Physical Exam   GENERAL: healthy, alert and no distress  EYES: Eyes grossly normal to inspection, PERRL and conjunctivae and sclerae normal  HENT: ear canals and TM's normal, nose and mouth without ulcers or lesions  NECK: no adenopathy, no asymmetry, masses, or scars and thyroid normal to palpation  RESP: lungs clear to auscultation - no rales, rhonchi or wheezes  CV: regular rate " and rhythm, normal S1 S2, no S3 or S4, no murmur, click or rub, no peripheral edema and peripheral pulses strong  ABDOMEN: soft, nontender, no hepatosplenomegaly, no masses and bowel sounds normal  MS: no gross musculoskeletal defects noted, no edema  NEURO: Normal strength and tone, sensory exam grossly normal, light touch and pinprick normal, proprioception normal, mentation intact, oriented times 3, speech normal, cranial nerves 2-12 intact, DTR's normal and symmetric, gait normal including heel/toe/tandem walking, Romberg normal and rapid alternating movements normal    Diagnostic Test Results:  Labs reviewed in Epic  Results for orders placed or performed in visit on 02/27/20   Lipid panel reflex to direct LDL Non-fasting     Status: Abnormal   Result Value Ref Range    Cholesterol 260 (H) <200 mg/dL    Triglycerides 434 (H) <150 mg/dL    HDL Cholesterol 48 >39 mg/dL    LDL Cholesterol Calculated  <100 mg/dL     Cannot estimate LDL when triglyceride exceeds 400 mg/dL    Non HDL Cholesterol 212 (H) <130 mg/dL   CRP, inflammation     Status: None   Result Value Ref Range    CRP Inflammation 3.8 0.0 - 8.0 mg/L   CBC with platelets and differential     Status: None   Result Value Ref Range    WBC 8.4 4.0 - 11.0 10e9/L    RBC Count 5.21 4.4 - 5.9 10e12/L    Hemoglobin 14.9 13.3 - 17.7 g/dL    Hematocrit 44.3 40.0 - 53.0 %    MCV 85 78 - 100 fl    MCH 28.6 26.5 - 33.0 pg    MCHC 33.6 31.5 - 36.5 g/dL    RDW 12.3 10.0 - 15.0 %    Platelet Count 348 150 - 450 10e9/L    % Neutrophils 46.2 %    % Lymphocytes 41.6 %    % Monocytes 7.9 %    % Eosinophils 3.7 %    % Basophils 0.6 %    Absolute Neutrophil 3.9 1.6 - 8.3 10e9/L    Absolute Lymphocytes 3.5 0.8 - 5.3 10e9/L    Absolute Monocytes 0.7 0.0 - 1.3 10e9/L    Absolute Eosinophils 0.3 0.0 - 0.7 10e9/L    Absolute Basophils 0.1 0.0 - 0.2 10e9/L    Diff Method Automated Method    ESR: Erythrocyte sedimentation rate     Status: None   Result Value Ref Range    Sed Rate 10  0 - 15 mm/h   Anti Nuclear Francoise IgG by IFA with Reflex     Status: None   Result Value Ref Range    HEIDI interpretation Negative NEG^Negative   Comprehensive metabolic panel (BMP + Alb, Alk Phos, ALT, AST, Total. Bili, TP)     Status: Abnormal   Result Value Ref Range    Sodium 140 133 - 144 mmol/L    Potassium 4.0 3.4 - 5.3 mmol/L    Chloride 107 94 - 109 mmol/L    Carbon Dioxide 25 20 - 32 mmol/L    Anion Gap 8 3 - 14 mmol/L    Glucose 105 (H) 70 - 99 mg/dL    Urea Nitrogen 9 7 - 30 mg/dL    Creatinine 0.81 0.66 - 1.25 mg/dL    GFR Estimate >90 >60 mL/min/[1.73_m2]    GFR Estimate If Black >90 >60 mL/min/[1.73_m2]    Calcium 9.7 8.5 - 10.1 mg/dL    Bilirubin Total 0.3 0.2 - 1.3 mg/dL    Albumin 4.3 3.4 - 5.0 g/dL    Protein Total 8.2 6.8 - 8.8 g/dL    Alkaline Phosphatase 106 40 - 150 U/L    ALT 29 0 - 70 U/L    AST 17 0 - 45 U/L   TSH with free T4 reflex     Status: None   Result Value Ref Range    TSH 1.00 0.40 - 4.00 mU/L   LDL cholesterol direct     Status: Abnormal   Result Value Ref Range    LDL Cholesterol Direct 186 (H) <100 mg/dL           Assessment & Plan     1. Paresthesia  Unclear etiology.  Negative cardiac workup.  Normal inflammatory labs.  I will have patient see neurology for further evaluation.  Continue with pyschiatry for anxiety management.    - CRP, inflammation  - CBC with platelets and differential  - ESR: Erythrocyte sedimentation rate  - Anti Nuclear Francoise IgG by IFA with Reflex  - Rheumatoid factor  - NEUROLOGY ADULT REFERRAL  - Comprehensive metabolic panel (BMP + Alb, Alk Phos, ALT, AST, Total. Bili, TP)  - TSH with free T4 reflex  - LDL cholesterol direct  - LDL cholesterol direct    2. Weakness of both upper extremities  - CRP, inflammation  - CBC with platelets and differential  - ESR: Erythrocyte sedimentation rate  - Anti Nuclear Francoise IgG by IFA with Reflex  - Rheumatoid factor  - NEUROLOGY ADULT REFERRAL  - Comprehensive metabolic panel (BMP + Alb, Alk Phos, ALT, AST, Total. Bili,  TP)  - TSH with free T4 reflex    3. Mixed hyperlipidemia  Very elevated with nonfasting levels, recommend repeat fasting.    - Lipid panel reflex to direct LDL Non-fasting  - Lipid panel reflex to direct LDL Fasting; Future       Patient Instructions   Labwork as we discussed to look for inflammatory disorder.      See attached for neurology referral as well.  Do call for that appointment and in meantime, we will follow up on lab results.      Return in about 1 week (around 3/5/2020) for Follow Up.    NAS Dumont CNP  [facility]

## 2020-02-28 LAB — ANA SER QL IF: NEGATIVE

## 2020-03-01 ENCOUNTER — HEALTH MAINTENANCE LETTER (OUTPATIENT)
Age: 40
End: 2020-03-01

## 2020-03-01 PROBLEM — E78.2 MIXED HYPERLIPIDEMIA: Status: ACTIVE | Noted: 2020-03-01

## 2020-03-02 LAB — RHEUMATOID FACT SER NEPH-ACNC: <20 IU/ML (ref 0–20)

## 2020-04-07 PROBLEM — E66.01 MORBID OBESITY (H): Chronic | Status: ACTIVE | Noted: 2018-01-08

## 2020-04-07 PROBLEM — Z82.49 FAMILY HISTORY OF CARDIAC ARRHYTHMIA: Status: ACTIVE | Noted: 2018-04-24

## 2020-04-27 ENCOUNTER — VIRTUAL VISIT (OUTPATIENT)
Dept: FAMILY MEDICINE | Facility: CLINIC | Age: 40
End: 2020-04-27
Payer: MEDICARE

## 2020-04-27 DIAGNOSIS — R00.2 HEART PALPITATIONS: Primary | ICD-10-CM

## 2020-04-27 DIAGNOSIS — R13.19 ESOPHAGEAL DYSPHAGIA: ICD-10-CM

## 2020-04-27 DIAGNOSIS — K21.9 GASTROESOPHAGEAL REFLUX DISEASE, ESOPHAGITIS PRESENCE NOT SPECIFIED: ICD-10-CM

## 2020-04-27 PROCEDURE — 99443 ZZC PHYSICIAN TELEPHONE EVALUATION 21-30 MIN: CPT | Performed by: NURSE PRACTITIONER

## 2020-04-27 ASSESSMENT — PATIENT HEALTH QUESTIONNAIRE - PHQ9: 5. POOR APPETITE OR OVEREATING: NEARLY EVERY DAY

## 2020-04-27 ASSESSMENT — ANXIETY QUESTIONNAIRES
IF YOU CHECKED OFF ANY PROBLEMS ON THIS QUESTIONNAIRE, HOW DIFFICULT HAVE THESE PROBLEMS MADE IT FOR YOU TO DO YOUR WORK, TAKE CARE OF THINGS AT HOME, OR GET ALONG WITH OTHER PEOPLE: EXTREMELY DIFFICULT
3. WORRYING TOO MUCH ABOUT DIFFERENT THINGS: NEARLY EVERY DAY
2. NOT BEING ABLE TO STOP OR CONTROL WORRYING: NEARLY EVERY DAY
7. FEELING AFRAID AS IF SOMETHING AWFUL MIGHT HAPPEN: NEARLY EVERY DAY
1. FEELING NERVOUS, ANXIOUS, OR ON EDGE: NEARLY EVERY DAY
5. BEING SO RESTLESS THAT IT IS HARD TO SIT STILL: NEARLY EVERY DAY
GAD7 TOTAL SCORE: 21
6. BECOMING EASILY ANNOYED OR IRRITABLE: NEARLY EVERY DAY

## 2020-04-27 ASSESSMENT — PAIN SCALES - GENERAL: PAINLEVEL: NO PAIN (0)

## 2020-04-27 NOTE — PATIENT INSTRUCTIONS
Get BP/HR checked at [facility] through this week.       [phone]-[place] [facility] Patch Monitor hook up number-they will let you know about hook up process, when to bring it back, and when it needs to be removed.       Follow up with GI for your stomach/gas pains    If heart palpitations and dizziness is worsening may need to be seen in clinic for further evaluation

## 2020-04-27 NOTE — PROGRESS NOTES
"Rafa Cochran is a 40 year old male who is being evaluated via a billable telephone visit.      The patient has been notified of following:     \"This telephone visit will be conducted via a call between you and your physician/provider. We have found that certain health care needs can be provided without the need for a physical exam.  This service lets us provide the care you need with a short phone conversation.  If a prescription is necessary we can send it directly to your pharmacy.  If lab work is needed we can place an order for that and you can then stop by our lab to have the test done at a later time.    Telephone visits are billed at different rates depending on your insurance coverage. During this emergency period, for some insurers they may be billed the same as an in-person visit.  Please reach out to your insurance provider with any questions.    If during the course of the call the physician/provider feels a telephone visit is not appropriate, you will not be charged for this service.\"    Patient has given verbal consent for Telephone visit?  Yes    How would you like to obtain your AVS? [employer]    Subjective     Rafa Cochran is a 40 year old male who presents to clinic today for the following health issues:    HPI    ED/UC Followup:    Facility:  [facility]   Date of visit: 4/26/2020  Reason for visit: anxiety, heart palpitations  Current Status: stable     Labs stable, WBC slightly high. EKG showed sinus tach with some PACs he said, trop negative/ given ativan which helped some.     Currently he feels: a little better than 20 minutes ago. In general not doing well. 3 days ago felt sick to his stomach. Got some gas pains, and as it intensified felt a \"pulsing\" feeling in his chest. Saturday it felt like every few minutes this pulsing was going on. He was trying to take his pulse and he felt his vein in his wrist was pulsating also. Yesterday he was getting dizzy while making dinner. Normally " "he walks about 5 miles a day and past day or two cannot walk more than 5-10 minutes due to heart skipping and feeling dizzy. He is still having some gas and \"tight pushing feeling in my throat\" .     He is not on medication for his heart. BP was high in the ER-161/99. He states he has high BP associated with anxiety.     Mood: does group therapy twice a week and on medication. Doesn't feel his anxiety is controlled currently. Follows with psychiatry, just saw her 2 weeks ago.     Needs to have a tooth pulled soon and is afraid of something occurring or blacking out. Doesn't have scheduled yet. Went for emergency dental visit recently, put it off due to maybe needing a root canal. He is concerned if he is put asleep and not waking up. He isn' in so much pain he cannt put it off     No fever/chills. Breathing is a little more labored but feels it is more anxiety related. Moving bowels daily, past few days multiple times a day, than gas pains. Has been vomiting/burping up stuff into back of his mouth.  He is on omeprazole. Then denies gas pain says its more upper GI pain. Is supposed to see GI provider, had upper EGD.           Patient Active Problem List   Diagnosis     Major depression in partial remission (H)     Bipolar affective disorder (H)     OCD (obsessive compulsive disorder)     Schizoaffective disorder (H)     Sinus tachycardia     Palpitations     Prediabetes     BABATUNDE (obstructive sleep apnea)- moderate (AHI 17)     Pain in joint of right shoulder     Morbid obesity (H)     Gastroesophageal reflux disease, esophagitis presence not specified     Family history of cardiac arrhythmia     H/O esophageal ulcer     Mixed hyperlipidemia     Past Surgical History:   Procedure Laterality Date     APPENDECTOMY       COMBINED ESOPHAGOSCOPY, GASTROSCOPY, DUODENOSCOPY (EGD) WITH CO2 INSUFFLATION N/A 11/25/2019    Procedure: ESOPHAGOGASTRODUODENOSCOPY, WITH CO2 INSUFFLATION;  Surgeon: Sabas Grajeda MD;  " Location: [facility] OR     ESOPHAGOSCOPY, GASTROSCOPY, DUODENOSCOPY (EGD), COMBINED N/A 2019    Procedure: Esophagogastroduodenoscopy, With Biopsy;  Surgeon: Sabas Grajeda MD;  Location: [facility] OR       Social History     Tobacco Use     Smoking status: Former Smoker     Last attempt to quit: 2013     Years since quittin.3     Smokeless tobacco: Never Used     Tobacco comment: 5 cigs per day   Substance Use Topics     Alcohol use: Yes     Alcohol/week: 0.0 standard drinks     Comment: rare use     Family History   Problem Relation Age of Onset     C.A.D. Maternal Grandfather      Diabetes Maternal Grandfather      Hypertension Maternal Grandfather      Heart Disease Maternal Grandfather      C.A.D. Paternal Grandmother      Diabetes Paternal Grandmother      Circulatory Paternal Grandmother      Gastrointestinal Disease Paternal Grandmother      Hypertension Paternal Grandfather      Circulatory Paternal Grandfather      Heart Disease Paternal Grandfather      Gastrointestinal Disease Paternal Grandfather      Heart Failure Paternal Grandfather      Hypertension Father      Alcohol/Drug Father      Allergies Father      Depression Father      Gastrointestinal Disease Father      Prostate Cancer Father      Cerebrovascular Disease Maternal Grandmother      Arthritis Maternal Grandmother      Heart Disease Maternal Grandmother      Alcohol/Drug Mother      Allergies Mother      Circulatory Mother      Depression Mother      Heart Disease Mother      Diabetes Mother      Alcohol/Drug Sister      Allergies Sister      Depression Sister      Genitourinary Problems Sister          Current Outpatient Medications   Medication Sig Dispense Refill     lorazepam (ATIVAN) 2 MG tablet Take 2 mg by mouth 3 times daily as needed Patient taking 3 mg in the morning, 2 mg in the afternoon and 2 mg in the evening. 30 tablet 0     mirtazapine (REMERON) 15 MG tablet 15 mg Patient taking 1.5 tablets at bedtime  2      omeprazole (PRILOSEC) 20 MG DR capsule Take 1 capsule (20 mg) by mouth daily 60 capsule 0     ORDER FOR DME autoCPAP 8cm       Allergies   Allergen Reactions     Nicotine Rash     Nicotine Patches     Pertussis Toxoid      Bad reaction as a child       Reviewed and updated as needed this visit by Provider  Tobacco  Allergies  Meds  Problems  Med Hx  Surg Hx  Fam Hx         Review of Systems   ROS COMP: Constitutional, HEENT, cardiovascular -as above, pulmonary, gi-as above and gu systems are negative, except as otherwise noted.       Objective   Reported vitals:  There were no vitals taken for this visit.   healthy, alert and no distress  PSYCH: Alert and oriented times 3; coherent speech, normal   rate and volume, able to articulate logical thoughts, able   to abstract reason,  His affect is normal and pleasant  RESP: No cough, no audible wheezing, able to talk in full sentences  Remainder of exam unable to be completed due to telephone visits    Diagnostic Test Results:  Labs reviewed in Epic        Assessment/Plan:  1. Heart palpitations  Patient having ongoing heart palpitations.  He would like this figured out sooner than later.  He will have a ZIO Patch heart monitor placed in [place].  This is considered essential.  He will also come to [place] to have a [facility] blood pressure and heart rate check.  Labs were normal in the emergency room, no need to do any lab work  - Zio Patch Holter Adult Pediatric Greater than 48 hrs; Future    2. Gastroesophageal reflux disease, esophagitis presence not specified  Continue for reflux for now.  Patient also having some bloating and gas concerns.  He is been working with gastroenterology about this, advised to return to them and see if they can work with him virtually  - omeprazole (PRILOSEC) 20 MG DR capsule; Take 1 capsule (20 mg) by mouth daily  Dispense: 60 capsule; Refill: 0    3. Esophageal dysphagia  As above  - omeprazole (PRILOSEC) 20 MG DR  capsule; Take 1 capsule (20 mg) by mouth daily  Dispense: 60 capsule; Refill: 0        Return in about 1 week (around 5/4/2020), or if symptoms worsen or fail to improve.      Phone call duration:  22  minutes    NAS Smith, NP-C  [facility]

## 2020-04-28 ENCOUNTER — ANCILLARY PROCEDURE (OUTPATIENT)
Dept: CARDIOLOGY | Facility: CLINIC | Age: 40
End: 2020-04-28
Attending: NURSE PRACTITIONER
Payer: MEDICARE

## 2020-04-28 DIAGNOSIS — R00.2 HEART PALPITATIONS: ICD-10-CM

## 2020-04-28 PROCEDURE — 0296T ZIO PATCH HOLTER ADULT PEDIATRIC GREATER THAN 48 HRS: CPT | Performed by: INTERNAL MEDICINE

## 2020-04-28 PROCEDURE — 0298T ZIO PATCH HOLTER ADULT PEDIATRIC GREATER THAN 48 HRS: CPT | Performed by: INTERNAL MEDICINE

## 2020-04-28 ASSESSMENT — ANXIETY QUESTIONNAIRES: GAD7 TOTAL SCORE: 21

## 2020-04-28 NOTE — LETTER
May 26, 2020      Rafa GOLDEN Sammie  [address]  [place] [address]        Dear ,    Here are your heart monitor results. If you have questions please don't hesitate to reach out. Follow up with cardiology in a few months for a second opinion about the other rare heart rhythms, likely would only need to be monitored once a year if that but doesn't hurt to get the second opinion.     Please contact the clinic if you have additional questions.  Thank you.     Sincerely,     NAS Smith, NP-C   [facility]

## 2020-04-28 NOTE — PATIENT INSTRUCTIONS
Patient has been prescribed a ZioPatch holter for 7 days.  Patient was instructed regarding the indication, function, care and prompt return of the ZioPatch holter monitor. The monitor, with S/N [id number],  was placed on the patient with instructions regarding care of the skin, electrodes, and monitor, as well as documentation in the patient diary. Patient demonstrated understanding of this information and agreed to call [employer] with further questions or concerns.

## 2020-04-30 ENCOUNTER — ALLIED HEALTH/NURSE VISIT (OUTPATIENT)
Dept: NURSING | Facility: CLINIC | Age: 40
End: 2020-04-30
Payer: MEDICARE

## 2020-04-30 VITALS — SYSTOLIC BLOOD PRESSURE: 128 MMHG | DIASTOLIC BLOOD PRESSURE: 72 MMHG | HEART RATE: 84 BPM | OXYGEN SATURATION: 97 %

## 2020-04-30 DIAGNOSIS — E78.2 MIXED HYPERLIPIDEMIA: Primary | ICD-10-CM

## 2020-04-30 PROCEDURE — 99207 ZZC NO CHARGE NURSE ONLY: CPT

## 2020-04-30 ASSESSMENT — PAIN SCALES - GENERAL: PAINLEVEL: NO PAIN (0)

## 2020-04-30 NOTE — PROGRESS NOTES
Patient consents to receive outdoor care: Yes    Upon arrival, patient instructed to proceed to designated location, place vehicle in park, turn off, and remove keys     If we are unable to safely and ergonomically able to provide care- is the patient able to safely able to get out of car and transfer to a chair? Yes        Patient would like to receive their AVS via MyChart.    Ancillary BP check    HTN Guidelines:  Age 18-59 BP range:  Less than 140/90  Age 60-85 with Diabetes:  Less than 140/90  Age 60-85 without Diabetes:  less than 150/90        Rafa Cochran is a 40 year old male who comes in today for a Blood Pressure check.    Patient is taking medication as prescribed  Patient is tolerating medications well.  Patient is not monitoring Blood Pressure at home.      BP goal: < 140/90mmHg (patient 18+ years of age with or without diabetes)    BP reading today: Passed     BP Readings from Last 1 Encounters:   02/27/20 132/88     A x-large cuff was used.  Pulse: 84    Vitals reviewed     Each reading recorded in vital signs section of chart: yes    Symptoms: none    Need for urgent appointment, based on criteria in ancillary BP workflow (elevated blood pressure and any of the following: dizziness, blurry vision, lightheadedness, severe shortness of breath, chest pain or visual disturbance): No       Plan: At goal follow up as directed by provider.      Completed by: Rubi Navarro [facility]

## 2020-04-30 NOTE — PATIENT INSTRUCTIONS
At [facility], we strive to deliver an exceptional experience to you, every time we see you.  If you receive a survey in the mail, please send us back your thoughts. We really do value your feedback.    Based on your medical history, these are the current health maintenance/preventive care services that you are due for (some may have been done at this visit.)  Health Maintenance Due   Topic Date Due     DTAP/TDAP/TD IMMUNIZATION (1 - Tdap) 02/01/2007     MEDICARE ANNUAL WELLNESS VISIT  06/18/2014     INFLUENZA VACCINE (1) 09/01/2019         Suggested websites for health information:  Www.[employer].fabrik : Up to date and easily searchable information on multiple topics.  Www.medlineplus.gov : medication info, interactive tutorials, watch real surgeries online  Www.familydoctor.org : good info from the Academy of Family Physicians  Www.cdc.gov : public health info, travel advisories, epidemics (H1N1)  Www.aap.org : children's health info, normal development, vaccinations  Www.health.[facility].[place].us : [place] dept of health, public health issues in [place], N1N1    Your care team:                            Family Medicine Internal Medicine   MD Bismark Landon MD Shantel Branch-Fleming, MD Katya Georgiev PA-C Nam Ho, MD Pediatrics   ANH Copeland, SHAUN Mckeon APRN MD Jen Judge MD Deborah Mielke, MD Kim Thein, APRN CNP      Clinic hours: Monday - Thursday 7 am-7 pm; Fridays 7 am-5 pm.   Urgent care: Monday - Friday 11 am-9 pm; Saturday and Sunday 9 am-5 pm.  Pharmacy : Monday -Thursday 8 am-8 pm; Friday 8 am-6 pm; Saturday and Sunday 9 am-5 pm.     Clinic: [phone]   Pharmacy: [phone]

## 2020-05-03 ENCOUNTER — NURSE TRIAGE (OUTPATIENT)
Dept: NURSING | Facility: CLINIC | Age: 40
End: 2020-05-03

## 2020-05-03 NOTE — TELEPHONE ENCOUNTER
"Seen at ER 4/26 for palpitations. ER thought r/t anxiety. Pt states ER said he was having PACs.  He saw EVA An NP in follow-up and Holter ordered. Holter on now. Pt states he is feeling more frequent \"skipped beats\" today. HR 90. Denies chest discomfort or SOB. States he feels lightheaded mostly when standing today and c/o feeling \"weak\" though from pt's description he sounds more fatigued than truly weak. . Advised ED now. Note: triage states no for lightheadedness/dizziness/weakness w/ disposition of immediate office eval. But pt continued to describe himself as \"weak\" even after discussing \"weak\" vs. \"fatigued/tired\".so advised ED.     Reason for Disposition    Wearing a \"holter monitor\" or \"cardiac event monitor\"    Additional Information    Negative: Passed out (i.e., lost consciousness, collapsed and was not responding)    Negative: Shock suspected (e.g., cold/pale/clammy skin, too weak to stand, low BP, rapid pulse)    Negative: Difficult to awaken or acting confused (e.g., disoriented, slurred speech)    Negative: Visible sweat on face or sweat dripping down face    Negative: Unable to walk, or can only walk with assistance (e.g., requires support)    Negative: [1] Received SHOCK from implantable cardiac defibrillator AND [2] persisting symptoms (i.e., palpitations, lightheadedness)    Negative: Sounds like a life-threatening emergency to the triager    Negative: Chest pain    Negative: Difficulty breathing    Negative: Dizziness, lightheadedness, or weakness    Negative: [1] Heart beating very rapidly (e.g., > 140 / minute) AND [2] present now  (Exception: during exercise)    Negative: Heart beating very slowly (e.g., < 50 / minute)  (Exception: athlete)    Negative: New or worsened shortness of breath with activity (dyspnea on exertion)    Negative: Patient sounds very sick or weak to the triager    Negative: [1] Heart beating very rapidly (e.g., > 140 / minute) AND [2] not present now  (Exception: " "during exercise)    Negative: [1] Skipped or extra beat(s) AND [2] occurs 4 or more times per minute    Negative: New or worsened ankle swelling    Negative: History of heart disease  (i.e., heart attack, bypass surgery, angina, angioplasty, CHF) (Exception: brief heart beat symptoms that went away and now feels well)    Negative: Age > 60 years (Exception: brief heart beat symptoms that went away and now feels well)    Negative: Taking water pill (i.e., diuretic) or heart medication (e.g., digoxin)    Commented on: [1] Skipped or extra beat(s) AND [2] increases with exercise or exertion     \"I'm not sure\"    Protocols used: HEART RATE AND HEARTBEAT [id number]      "

## 2020-05-11 ENCOUNTER — TELEPHONE (OUTPATIENT)
Dept: FAMILY MEDICINE | Facility: CLINIC | Age: 40
End: 2020-05-11

## 2020-05-11 NOTE — TELEPHONE ENCOUNTER
Patient requesting her zio patch results from 4/28/20. Looks like still in process.    Writer unsure how long it takes to get results back.    Provider please review and advise.    Jyothi Avendano RN

## 2020-05-11 NOTE — TELEPHONE ENCOUNTER
Results are still in process. It can take up to 4-8 weeks to get results back. If has on-going symptoms or worsening recommend another phone visit or in-person.  Thank you,  NAS Smith, NP-C  [facility]

## 2020-05-11 NOTE — TELEPHONE ENCOUNTER
Reason for call:  Other   Patient called regarding (reason for call): call back  Additional comments: Patient is requesting the results from the siopatch monitor    Phone number to reach patient:  Cell number on file:    Telephone Information:   Mobile [phone]       Best Time:  After 12 pm    Can we leave a detailed message on this number?  YES    Travel screening: Not Applicable

## 2020-05-12 NOTE — TELEPHONE ENCOUNTER
This writer attempted to contact pt on 05/12/20      Reason for call ziopatch and left detailed message.      If patient calls back:   Left detailed message informing pt of message below        Cherrie Douglas CMA

## 2020-05-13 ENCOUNTER — VIRTUAL VISIT (OUTPATIENT)
Dept: FAMILY MEDICINE | Facility: CLINIC | Age: 40
End: 2020-05-13
Payer: MEDICARE

## 2020-05-13 VITALS — TEMPERATURE: 98.1 F | WEIGHT: 280 LBS | BODY MASS INDEX: 36.94 KG/M2

## 2020-05-13 DIAGNOSIS — K21.9 GASTROESOPHAGEAL REFLUX DISEASE, ESOPHAGITIS PRESENCE NOT SPECIFIED: ICD-10-CM

## 2020-05-13 DIAGNOSIS — F41.9 ANXIETY: ICD-10-CM

## 2020-05-13 DIAGNOSIS — R00.2 HEART PALPITATIONS: Primary | ICD-10-CM

## 2020-05-13 PROCEDURE — 99443: CPT | Performed by: NURSE PRACTITIONER

## 2020-05-13 ASSESSMENT — ANXIETY QUESTIONNAIRES
2. NOT BEING ABLE TO STOP OR CONTROL WORRYING: NEARLY EVERY DAY
GAD7 TOTAL SCORE: 20
6. BECOMING EASILY ANNOYED OR IRRITABLE: NEARLY EVERY DAY
IF YOU CHECKED OFF ANY PROBLEMS ON THIS QUESTIONNAIRE, HOW DIFFICULT HAVE THESE PROBLEMS MADE IT FOR YOU TO DO YOUR WORK, TAKE CARE OF THINGS AT HOME, OR GET ALONG WITH OTHER PEOPLE: EXTREMELY DIFFICULT
5. BEING SO RESTLESS THAT IT IS HARD TO SIT STILL: MORE THAN HALF THE DAYS
1. FEELING NERVOUS, ANXIOUS, OR ON EDGE: NEARLY EVERY DAY
7. FEELING AFRAID AS IF SOMETHING AWFUL MIGHT HAPPEN: NEARLY EVERY DAY
3. WORRYING TOO MUCH ABOUT DIFFERENT THINGS: NEARLY EVERY DAY

## 2020-05-13 ASSESSMENT — PATIENT HEALTH QUESTIONNAIRE - PHQ9
5. POOR APPETITE OR OVEREATING: NEARLY EVERY DAY
SUM OF ALL RESPONSES TO PHQ QUESTIONS 1-9: 12

## 2020-05-13 ASSESSMENT — PAIN SCALES - GENERAL: PAINLEVEL: NO PAIN (0)

## 2020-05-13 NOTE — PROGRESS NOTES
"Rafa Cochran is a 40 year old male who is being evaluated via a billable telephone visit.      The patient has been notified of following:     \"This telephone visit will be conducted via a call between you and your physician/provider. We have found that certain health care needs can be provided without the need for a physical exam.  This service lets us provide the care you need with a short phone conversation.  If a prescription is necessary we can send it directly to your pharmacy.  If lab work is needed we can place an order for that and you can then stop by our lab to have the test done at a later time.    Telephone visits are billed at different rates depending on your insurance coverage. During this emergency period, for some insurers they may be billed the same as an in-person visit.  Please reach out to your insurance provider with any questions.    If during the course of the call the physician/provider feels a telephone visit is not appropriate, you will not be charged for this service.\"    Patient has given verbal consent for Telephone visit?  Yes    What phone number would you like to be contacted at? [phone]    How would you like to obtain your AVS? MyChart    Subjective      23min    Rafa Cochran is a 40 year old male who presents to clinic today for the following health issues:    HPI  Abnormal Mood Symptoms      Duration: whole life    Description:  Depression: YES  Anxiety: YES  Panic attacks: YES     Accompanying signs and symptoms: see PHQ-9 and ALEXI scores    History (similar episodes/previous evaluation): follows with psychiatry    Precipitating or alleviating factors: None    Therapies tried and outcome: Ativan (Lorezepam) and remeron        Follow up on holter monitor/tooth extraction. Has a \"dead tooth\" and needs it taken care of. Getting novocain and local medication for tooth extraction.  He isn't sure he can go through this procedure and keep his BP down as his BP gets high when he " gets anxious. Had BP/HR checked at [facility]  and it was normal. He also thought the ZIO patch was going to give immediate results. He feels like the heart palpitations have been steady, not more than normal. We talked about the difference between EKG and ZIO patch monitor and how one gives immediate results like a 'snap shot in time' and the other takes longer. apologized if he felt the explaination of when he would get the ZIO patch information felt mis-leading. Advised we will try to get the results this week, or sooner than normal. Hx of cardiac workup, MRA, nothing structurally wrong with his heart in the past.     His psychiatrist has talked about propranolol before for anxiety. We talked about starting either that or metoprolol for the heart palpitations.  He said sometimes starting new medications can increase his anxiety.  When he feels more anxiety he feels more heart palpitations. Asking about the danger of elevated blood pressure and going through the tooth extraction. Advised that being anxious and that causing elevated blood pressure is common for many people. It is likely not dangerous to his heart if he has local numbing medication for his tooth extraction, but advised I cannot say 100% as we do not have the ZIO patch results. Encouraged him to use his ativan according to his prescription and take one prior to going for his dental procedure to help keep him feeling calm.     Ativan uses for anxiety and it is helpful when he uses it. Brings anxiety into a range that feels manageable most of the time. Gets from psychiatry. He admits he is addicted to it. Takes 2 mg every 4 hours, takes up to 6 mg a day. Feels anxiety is at a 10 on a scale of 0-10 with 10 being the worst. Feels not knowing about his heart is causing extra anxiety and also having had to push out the tooth extraction.     Trying omeprazole 20 mg daily-for the past few months. It is helping and taken it down some. Having more  gas pains than anything. Feels like he is throwing up in the back of this throat. Follows with GI.  Also feeling lots of indigestion with this as well. Said we could maybe try omeprazole 20 mg BID to see if that helps his gas feelings. He will consider it.       Patient Active Problem List   Diagnosis     Major depression in partial remission (H)     Bipolar affective disorder (H)     OCD (obsessive compulsive disorder)     Schizoaffective disorder (H)     Sinus tachycardia     Palpitations     Prediabetes     BABATUNDE (obstructive sleep apnea)- moderate (AHI 17)     Pain in joint of right shoulder     Morbid obesity (H)     Gastroesophageal reflux disease, esophagitis presence not specified     Family history of cardiac arrhythmia     H/O esophageal ulcer     Mixed hyperlipidemia     Past Surgical History:   Procedure Laterality Date     APPENDECTOMY       COMBINED ESOPHAGOSCOPY, GASTROSCOPY, DUODENOSCOPY (EGD) WITH CO2 INSUFFLATION N/A 2019    Procedure: ESOPHAGOGASTRODUODENOSCOPY, WITH CO2 INSUFFLATION;  Surgeon: Sabas Grajeda MD;  Location: [facility] OR     ESOPHAGOSCOPY, GASTROSCOPY, DUODENOSCOPY (EGD), COMBINED N/A 2019    Procedure: Esophagogastroduodenoscopy, With Biopsy;  Surgeon: Sabas Grajeda MD;  Location: [facility] OR       Social History     Tobacco Use     Smoking status: Former Smoker     Last attempt to quit: 2013     Years since quittin.3     Smokeless tobacco: Never Used     Tobacco comment: 5 cigs per day   Substance Use Topics     Alcohol use: Yes     Alcohol/week: 0.0 standard drinks     Comment: rare use     Family History   Problem Relation Age of Onset     C.A.D. Maternal Grandfather      Diabetes Maternal Grandfather      Hypertension Maternal Grandfather      Heart Disease Maternal Grandfather      C.A.D. Paternal Grandmother      Diabetes Paternal Grandmother      Circulatory Paternal Grandmother      Gastrointestinal Disease Paternal Grandmother       Hypertension Paternal Grandfather      Circulatory Paternal Grandfather      Heart Disease Paternal Grandfather      Gastrointestinal Disease Paternal Grandfather      Heart Failure Paternal Grandfather      Hypertension Father      Alcohol/Drug Father      Allergies Father      Depression Father      Gastrointestinal Disease Father      Prostate Cancer Father      Cerebrovascular Disease Maternal Grandmother      Arthritis Maternal Grandmother      Heart Disease Maternal Grandmother      Alcohol/Drug Mother      Allergies Mother      Circulatory Mother      Depression Mother      Heart Disease Mother      Diabetes Mother      Alcohol/Drug Sister      Allergies Sister      Depression Sister      Genitourinary Problems Sister          Current Outpatient Medications   Medication Sig Dispense Refill     lorazepam (ATIVAN) 2 MG tablet Take 2 mg by mouth 3 times daily as needed Patient taking 3 mg in the morning, 2 mg in the afternoon and 2 mg in the evening. 30 tablet 0     mirtazapine (REMERON) 15 MG tablet 15 mg Patient taking 1.5 tablets at bedtime  2     omeprazole (PRILOSEC) 20 MG DR capsule Take 1 capsule (20 mg) by mouth daily 60 capsule 0     ORDER FOR DME autoCPAP 8cm       Allergies   Allergen Reactions     Nicotine Rash     Nicotine Patches     Pertussis Toxoid      Bad reaction as a child       Reviewed and updated as needed this visit by Provider  Tobacco  Allergies  Meds  Problems  Med Hx  Surg Hx  Fam Hx         Review of Systems   Constitutional, HEENT, cardiovascular-as above, pulmonary, gi-as above and gu psych as above, systems are negative, except as otherwise noted.       Objective   Reported vitals:  Temp 98.1  F (36.7  C)   Wt 127 kg (280 lb)   BMI 36.94 kg/m     healthy, alert and no distress  PSYCH: Alert and oriented times 3; coherent speech, normal   rate and volume, able to articulate logical thoughts, able   to abstract reason,   His affect is normal  RESP: No cough, no audible  wheezing, able to talk in full sentences  Remainder of exam unable to be completed due to telephone visits    Diagnostic Test Results:  Labs reviewed in Epic        Assessment/Plan:  1. Heart palpitations   zio patch results pending still. Sent message to staff to call to see if we can get it read this week. Not knowing the results is causing additional anxiety for the patient. Talked about starting metoprolol or propranolol for it, he didn't want to start anything at this time    2. Gastroesophageal reflux disease, esophagitis presence not specified  No change. Suggested could increase omeprazole to 20 mg BID, needs to follow up with GI    3. Anxiety  On-going. Follows with psychiatry. May want to consider propranolol to help with both anxiety/palpitations but advised it would need to be taken daily to be proactive about preventing palpitations vs being reactive and taking something after the palpitations occur. He will think about it but we are not starting at this time.     Return in about 1 week (around 5/20/2020), or if symptoms worsen or fail to improve.      Phone call duration:  23 minutes    NAS Smith, NP-C  [facility]

## 2020-05-13 NOTE — Clinical Note
Please call the ZIO patch monitor people to try and get a rush on patient's reading of. He is looking to have his tooth extracted very soon but we

## 2020-05-13 NOTE — PATIENT INSTRUCTIONS
Consider increasing omeprazole to 20 mg twice a day to see if it helps gas pains    Likely okay to have tooth extraction with local numbing medication and not likely to cause problems with your heart, but as discussed we cannot be 100% sure on this without knowing the final ZIO patch results.    Provider sent a message to staff to call the Zio patch people to see if we can get earlier results this week    We can consider starting propranolol or metoprolol daily for heart palpitations. Can also wait for final ZIO patch results        At [facility], we strive to deliver an exceptional experience to you, every time we see you. If you receive a survey, please complete it as we do value your feedback.  If you have MyChart, you can expect to receive results automatically within 24 hours of their completion.  Your provider will send a note interpreting your results as well.   If you do not have MyChart, you should receive your results in about a week by mail.    Your care team:     Family Medicine   ANH Ruby APRN CNP   S. MD Brandy Nguyen MD Angela Wermerskirchen, MD    Internal Medicine  Harrison Salazar MD     Clinic hours: Monday - Wednesday 7 am-7 pm   Thursdays and Fridays 7 am-5 pm.     [facility]: Monday - Friday 11 am-9 pm,   Saturday and Sunday 9 am-5 pm.     [facility]: Monday - Thursday 8 am - 7 pm; Friday 8 am - 6 pm    Clinic: [phone]   [facility]: [phone]     Use [email or web address] for 24/7 diagnosis and treatment of dozens of conditions.

## 2020-05-13 NOTE — Clinical Note
Please call ZIO patch monitor people: would like results of ZIO patch this week if possible. Trying to determine if any cardiac abnormality would affect his local sedation if he had dental work done in the next week or two.    Thank you,  NAS Smith, NP-C  [facility]

## 2020-05-14 ASSESSMENT — ANXIETY QUESTIONNAIRES: GAD7 TOTAL SCORE: 20

## 2020-05-22 VITALS — BODY MASS INDEX: 37.11 KG/M2 | WEIGHT: 280 LBS | HEIGHT: 73 IN

## 2020-05-22 ASSESSMENT — MIFFLIN-ST. JEOR: SCORE: 2243.95

## 2020-05-22 NOTE — PROGRESS NOTES
"Rafa Cochran is a 40 year old male who is being evaluated via a billable telephone visit.      The patient has been notified of following:     \"This telephone visit will be conducted via a call between you and your physician/provider. We have found that certain health care needs can be provided without the need for a physical exam.  This service lets us provide the care you need with a short phone conversation.  If a prescription is necessary we can send it directly to your pharmacy.  If lab work is needed we can place an order for that and you can then stop by our lab to have the test done at a later time.    Telephone visits are billed at different rates depending on your insurance coverage. During this emergency period, for some insurers they may be billed the same as an in-person visit.  Please reach out to your insurance provider with any questions.    If during the course of the call the physician/provider feels a telephone visit is not appropriate, you will not be charged for this service.\"    Patient has given verbal consent for Telephone visit?  Yes    What phone number would you like to be contacted at?   [phone]   How would you like to obtain your AVS? Licha Davenport MA on 5/22/2020 at 1:15 PM      Obstructive Sleep Apnea - PAP Follow-Up Visit:    Chief Complaint   Patient presents with     Sleep Problem     Returning cpap pt.        Rafa Cochran comes in today for follow-up of their moderate sleep apnea, managed with CPAP.     Initially presented in 2014 with obesity, fatigue, snoring, witnessed apneas, frequent headaches and difficulty breathing through his nose, with comorbid major mood disorder No daytime sleepiness (ESS 1), possibly under-reported due to anxiety and excessive caffeine use.    Study Date: 12/29/2014- (270.3 lbs) The lowest oxygen saturation was 86.0%. Apnea/Hypopnea Index was 16.9 events per hour. The REM AHI was 51.4. The RERA index was 17.3 per hour. The RDI " was 34.2. CPAP optimal pressure was 8 with an AHI of 1.4. Supine REM was seen at this pressure.    The patient was initiated on CPAP 8cm.  He got his machine and supplies at [facility].   There was no further follow-up with [facility].    He needs new supplies. His humidifer does not work as well. He goes through 1/3 tank of water even with it turned all the way up. He feels his sinuses are dry and plugged up in the morning.     His toes and fingers are tingling in the morning.     Overall, he rates the experience with PAP as good    His PAP interface is nasal pillows.    Total score - [place]: 1 (5/22/2020  1:00 PM)      Auto-PAP 8 - 12 cmH2O 30 day usage data:    100% of days with > 4 hours of use. 0/30 days with no use.   Average use 8' 48 minutes per day.   Large leak 14s/day.    CPAP 90% pressure 10.4cm.   AHI 2.6 events per hour.      Past medical/surgical history, family history, social history, medications and allergies were reviewed.      Problem List:  Patient Active Problem List    Diagnosis Date Noted     Mixed hyperlipidemia 03/01/2020     Priority: Medium     Morbid obesity (H) 01/08/2018     Priority: Medium     Pain in joint of right shoulder 08/12/2015     Priority: Medium     BABATUNDE (obstructive sleep apnea)- moderate (AHI 17) 12/30/2014     Priority: Medium     Study Date: 12/29/2014- (270.3 lbs)  The lowest oxygen saturation was 86.0%. Apnea/Hypopnea Index was 16.9 events per hour.  The REM AHI was 51.4.  The RERA index was 17.3 per hour.   The RDI was 34.2.  CPAP optimal pressure was 8 with an AHI of 1.4. Supine REM was seen at this pressure.        Prediabetes 07/05/2013     Priority: Medium     OCD (obsessive compulsive disorder) 01/07/2011     Priority: Medium     Schizoaffective disorder (H) 01/07/2011     Priority: Medium     Dx at age 10. Disabled from mental health since age 17. Dr. Jethro Wakefield at [facility]   Now seeing Dr. Adhikari at [facility]       Major depression in  "partial remission (H) 09/20/2010     Priority: Medium     Bipolar affective disorder (H) 09/20/2010     Priority: Medium     Dx at age 10. Disabled from mental health since age 17. Dr. Jethro Wakefield at [facility].   Now seeing Dr. Adhikari at [facility]       Family history of cardiac arrhythmia 04/24/2018     Priority: Low     Gastroesophageal reflux disease, esophagitis presence not specified 01/08/2018     Priority: Low     H/O esophageal ulcer 01/01/2013     Priority: Low     Sinus tachycardia 06/02/2011     Priority: Low     Palpitations 06/02/2011     Priority: Low        Ht 1.854 m (6' 1\")   Wt 127 kg (280 lb)   BMI 36.94 kg/m      Impression/Plan:     Moderate Sleep apnea. Tolerating PAP well.  - Continue current treatments.   - We discussed use of Netti-pot  - See DME to asses humifdifer function.     Email copy of prescription to [email or web address]    Rafa Cochran will follow up in about 2 year(s).     Phone call duration: 25 minutes    Kalpesh Mclaughlin MD        "

## 2020-05-25 PROBLEM — E78.2 MIXED HYPERLIPIDEMIA: Chronic | Status: ACTIVE | Noted: 2020-03-01

## 2020-05-26 ENCOUNTER — TELEPHONE (OUTPATIENT)
Dept: FAMILY MEDICINE | Facility: CLINIC | Age: 40
End: 2020-05-26

## 2020-05-26 ENCOUNTER — VIRTUAL VISIT (OUTPATIENT)
Dept: SLEEP MEDICINE | Facility: CLINIC | Age: 40
End: 2020-05-26
Payer: MEDICARE

## 2020-05-26 DIAGNOSIS — E66.01 MORBID OBESITY (H): Chronic | ICD-10-CM

## 2020-05-26 DIAGNOSIS — R00.2 HEART PALPITATIONS: Primary | ICD-10-CM

## 2020-05-26 DIAGNOSIS — G47.33 OSA (OBSTRUCTIVE SLEEP APNEA): Primary | Chronic | ICD-10-CM

## 2020-05-26 PROCEDURE — 99443: CPT | Performed by: INTERNAL MEDICINE

## 2020-05-26 NOTE — TELEPHONE ENCOUNTER
Provider called patient. He started working out recently, and he feels the heart palpitations are occurring much less now. He denies heart chest pain with exercise but feels some muscle wall pain at times. Has some dizziness but feels is more related to the heat. We talked about how most of his events were related to PVCs. He had some irregular rhythems such as 2nd degree AVB, they were rare. He can follow up with cardiology to get second opinion in the next few months. Likely just monitoring at this time.     All questions answered.     NAS Smith, NP-C  [facility]

## 2020-05-27 NOTE — ADDENDUM NOTE
Addended by: CHI BYRD on: 5/27/2020 02:57 PM     Modules accepted: Orders, SmartSet    
Patient states no history

## 2020-05-29 ENCOUNTER — MEDICAL CORRESPONDENCE (OUTPATIENT)
Dept: HEALTH INFORMATION MANAGEMENT | Facility: CLINIC | Age: 40
End: 2020-05-29

## 2020-06-03 NOTE — PROGRESS NOTES
"Rafa Cochran is a 40 year old male who is being evaluated via a billable video visit.      The patient has been notified of following:     \"This video visit will be conducted via a call between you and your physician/provider. We have found that certain health care needs can be provided without the need for an in-person physical exam.  This service lets us provide the care you need with a video conversation.  If a prescription is necessary we can send it directly to your pharmacy.  If lab work is needed we can place an order for that and you can then stop by our lab to have the test done at a later time.    Video visits are billed at different rates depending on your insurance coverage.  Please reach out to your insurance provider with any questions.    If during the course of the call the physician/provider feels a video visit is not appropriate, you will not be charged for this service.\"    Patient has given verbal consent for Video visit? Yes  How would you like to obtain your AVS? As above    Patient would like the video invitation sent by:email as above    Video-Visit Details    Type of service:  Video Visit    Video Start Time: 3:01 PM  Video End Time: 3:31 PM    Originating Location (pt. Location): Home    Distant Location (provider location):  [facility]     Platform used for Video Visit: Clean[employer]    Chief complaint: dyspnea, abnormal echocardiogram    HPI:   Rafa Cochran is a 40 year old male referred for evaluation of dyspnea and abnormal echocardiogram.  The patient had originally been seen by a variety of providers including emergency department providers.  He had symptoms including dyspnea, palpitations, and cough. He underwent stress echocardiogram at [facility]) which showed baseline sinus tachycardia  and mildly reduced resting LV function, LVEF=45-50%. He achieved 7 METs and 98% MPHR after 5 minutes 15 seconds of exercise.  Given his abnormal " "echocardiogram, he was referred to cardiology.    At his last visit, the patient did report multiple similar symptoms as above.  He underwent cardiac MRI which showed a normal heart structure and function.  He also underwent a ZIO Patch 2 months ago which showed no arrhythmias except for rare PVCs.  He has been seen in the emergency department multiple times for similar symptoms including palpitations, cough, and chest pain.    Today, the patient reports that following the death of his father, his symptoms have gotten much worse.  He has frequent episodes of palpitations that are often associated with a \"clammy\" sensation in his hands.  He also has a tight feeling in his chest.  The tight feeling in his chest almost exclusively occurs during the setting of anxiety, however he can have palpitations outside of severe anxiety episodes.  He has been going to see a dentist, and they have noted his blood pressure to be as high as 150 systolically which has delayed a dental procedure for him.  He reports walking on a treadmill up to 10 miles per day, though recently has been doing less.      BP Readings from Last 6 Encounters:   04/30/20 128/72   02/27/20 132/88   01/16/20 136/75   11/25/19 119/77   11/19/19 136/76   11/11/19 122/84         PAST MEDICAL HISTORY:  Past Medical History:   Diagnosis Date     H/O esophageal ulcer 2013     Nephrolithiasis 2009     Panic attack 7/5/2013     Shoulder pain, left 10/19/2015     Sprain and strain of shoulder and upper arm, left, initial encounter 10/19/2015       CURRENT MEDICATIONS:  Current Outpatient Medications   Medication Sig Dispense Refill     lorazepam (ATIVAN) 2 MG tablet Take 2 mg by mouth 3 times daily as needed Patient taking 3 mg in the morning, 2 mg in the afternoon and 2 mg in the evening. 30 tablet 0     mirtazapine (REMERON) 15 MG tablet 15 mg Patient taking 1.5 tablets at bedtime  2     omeprazole (PRILOSEC) 20 MG DR capsule Take 1 capsule (20 mg) by mouth daily " 60 capsule 0     order for DME autoCPAP 8-12cm         PAST SURGICAL HISTORY:  Past Surgical History:   Procedure Laterality Date     APPENDECTOMY       COMBINED ESOPHAGOSCOPY, GASTROSCOPY, DUODENOSCOPY (EGD) WITH CO2 INSUFFLATION N/A 2019    Procedure: ESOPHAGOGASTRODUODENOSCOPY, WITH CO2 INSUFFLATION;  Surgeon: Sabas Grajeda MD;  Location: [facility] OR     ESOPHAGOSCOPY, GASTROSCOPY, DUODENOSCOPY (EGD), COMBINED N/A 2019    Procedure: Esophagogastroduodenoscopy, With Biopsy;  Surgeon: Sabas Grajeda MD;  Location: [facility] OR       ALLERGIES:     Allergies   Allergen Reactions     Nicotine Rash     Nicotine Patches     Pertussis Toxoid      Bad reaction as a child       FAMILY HISTORY:  Reviewed, of note:  Sister with atrial fibrillation (diagnosed age 21-22)  Mother with mitral valve prolapse and had MVR, had some arrhythmia (not sure what type) which resolved after surgery  Maternal grandmother had rheumatic heart disease, and  in her 50s of arrhythmia   Paternal grandfather had HF, heavy smoker.  in his 80s.  Maternal grandfather had CAD.  in his 80s.    SOCIAL HISTORY:  Social History     Tobacco Use     Smoking status: Former Smoker     Last attempt to quit: 2013     Years since quittin.4     Smokeless tobacco: Never Used     Tobacco comment: 5 cigs per day   Substance Use Topics     Alcohol use: Yes     Alcohol/week: 0.0 standard drinks     Comment: rare use     Drug use: No       ROS:   A comprehensive 14 point review of systems is negative other than as mentioned in HPI.    Exam:  CONSITUTIONAL: no acute distress  HEENT: no icterus, sclerae white  CV: no visible edema of visualized upper extremities, no gross JVD  CHEST: respirations nonlabored, no audible wheezing  NEURO: AA&Ox3, speech fluent/appropriate, motor grossly nonfocal  PSYCH: cooperative, affect appropriate  DERM: no rashes on visualized face/neck/upper extremities    The rest of a comprehensive  physical examination is deferred due to PHE (public health emergency) video visit restrictions.      Labs:  Reviewed.     Testing/Procedures:    Outside records from [facility] were obtained, and relevant results/notes have been incorporated into HPI.    Outside results of note:  Other Result Information  Exercise Echocardiogram ([facility]):  baseline sinus tachycardia  and mildly reduced resting LV function, LVEF=45-50%. He achieved 7 METs and 98% MPHR after 5 minutes 15 seconds of exercise.    No angina elicited.  No significant valvular abnormalities.     ZioPatch 12/7/18-12/20/18 ([facility]):  Patient had a min HR of 47 bpm, max HR of 155 bpm, and avg HR of 87 bpm.   Predominant underlying rhythm was Sinus Rhythm.   Isolated SVEs were rare (<1.0%), and no SVE Couplets or SVE Triplets were present.  Isolated VEs were rare (<1.0%), VE Couplets were rare (<1.0%), and no VE Triplets were present.  Patient events for shortness of breath, lightheaded, anxious, chest pain/pressure/tingling in neck or arm correlate to sinus rhythm, sinus tachycardia.  Rates range 90 - 130 bpm.  Approximately half of the events correlate to with normal heart rate.  One patient event for skipped beat correlated to sinus rhythm 91 bpm  CONCLUSIONS  1. The predominant underlying rhythm was sinus  2. No significant arrhthmias observed during this study    Zio 4/28/20      Cardiac MRI 3/29/19     Normal cardiac structure and function. No cardiomyopathy. The patient had sinus tachycardia, likely from  anxiety, and I suspect this may have contributed to the incorrect diagnosis of cardiomyopathy on  echocardiography at [facility].    Assessment and Plan:   In summary, this is a 40-year-old man with a past medical history most notable for anxiety who presented to us with an abnormal echocardiogram who was found to have palpitations.    1.  Abnormal echocardiogram: During the patient's evaluation for his palpitations at a  previous hospital, he was noted to have an EF of approximately 45 to 50%.  We performed a cardiac MRI which showed a completely normal cardiac structure and function.  Without formally reviewing the results, it is unclear if his EF was slightly low at the time of his abnormal stress test.  However, his symptoms are not consistent with a progressive cardiomyopathy and given that his heart is completely normal on a cardiac MRI, suggest that this was likely read as falsely low.  There is no further cardiac evaluation that is needed for this.    2.  Palpitations: The patient's palpitations are likely PACs and PVCs in the setting of his severe anxiety.  Zio Patch did show rare PVCs.  Unfortunately, the patient's anxiety has been significantly worse following the death of his father and therefore his symptoms have been worse.  He is following closely with a mental health provider who plans to start him on propranolol. There is no cardiac contraindication to this.     3. Physiologic elevation in blood pressure: Patient with noted high blood pressure readings during times of stress such as going to the dentist. High resting blood pressure on multiple offices visits has been normal. There is no further cardiac workup of this that is required as this is physiologic. There is no cardiac contraindication to him undergoing dental procedures.     Greater than 50% of this 30 min patient encounter was spent on counseling regarding his palpitations and anxiety.    He can follow up on an as needed basis.     This patient was seen and staffed with my attending, Dr. Benítez.    Phillip Rodas MD    ATTENDING ATTESTATION     I was present for the entire video visit, including all critical portions and decision-making.     Patient was seen and evaluated with Dr. Rodas History was confirmed personally by me. Labs, imaging studies, and EKGs were reviewed.     Agree with assessment and plan as outlined above. The note has been edited by me as  needed to produce a single, cohesive document.     Total video visit time: 30 minutes    Jordon Benítez MD  Staff Cardiologist

## 2020-06-04 ENCOUNTER — VIRTUAL VISIT (OUTPATIENT)
Dept: CARDIOLOGY | Facility: CLINIC | Age: 40
End: 2020-06-04
Attending: NURSE PRACTITIONER
Payer: MEDICARE

## 2020-06-04 DIAGNOSIS — R00.2 HEART PALPITATIONS: ICD-10-CM

## 2020-06-04 PROCEDURE — 99214 OFFICE O/P EST MOD 30 MIN: CPT | Mod: 95 | Performed by: INTERNAL MEDICINE

## 2020-06-04 NOTE — PROGRESS NOTES
"Rafa Cochran is a 40 year old male who is being evaluated via a billable video visit.      The patient has been notified of following:     \"This video visit will be conducted via a call between you and your physician/provider. We have found that certain health care needs can be provided without the need for an in-person physical exam.  This service lets us provide the care you need with a video conversation.  If a prescription is necessary we can send it directly to your pharmacy.  If lab work is needed we can place an order for that and you can then stop by our lab to have the test done at a later time.    Video visits are billed at different rates depending on your insurance coverage.  Please reach out to your insurance provider with any questions.    If during the course of the call the physician/provider feels a video visit is not appropriate, you will not be charged for this service.\"    Patient has given verbal consent for Video visit? Yes    How would you like to obtain your AVS? Licha    Patient would like the video invitation sent by: Send to e-mail at: [email or web address]    Will anyone else be joining your video visit? No          Virgil Brewer LPN    Rheumatology / Pulmonology  [facility]      "

## 2020-06-04 NOTE — PATIENT INSTRUCTIONS
"1. Good news! Your heart appears normal. Your palpitations are normal extra heart beats.     2. We will see you on an \"as needed\" basis. Please feel free to reach out to our team if you'd like to be seen again.    3. It is OK with us for you to have a dental procedure done.    4. It is OK with us from your psychiatrist to start you on propranolol.  "

## 2020-07-07 ENCOUNTER — TELEPHONE (OUTPATIENT)
Dept: CARDIOLOGY | Facility: CLINIC | Age: 40
End: 2020-07-07

## 2020-07-07 NOTE — TELEPHONE ENCOUNTER
PT called again today.  He said that his dental office has not gotten clearance from Dr. Benítez yet to have the dental procedure.  He said that his situation is becoming more urgent as his tooth is falling apart and he's in a lot of pain and needs the dental procedure done soon.  PT said his dental office has faxed and emailed the request.  Please advise, thanks.    [facility] Call Center    Phone Message    May a detailed message be left on voicemail: yes     Reason for Call: Form or Letter   Type or form/letter needing completion: Forms needed to be filled out for medical clearance for a dental procedure. Patient said that the [facility] [place] emailed Dr. Benítez the information.   Provider: Jeyson    Please advise. Thank you.    Action Taken: Message routed to:  Adult Clinics: Cardiology p [phone]    Travel Screening: Not Applicable

## 2020-07-22 NOTE — TELEPHONE ENCOUNTER
This has been addressed.    Elle Wood RN, BSN  Nephrology Care Coordinator  [facility]

## 2020-07-24 ENCOUNTER — CARE COORDINATION (OUTPATIENT)
Dept: SLEEP MEDICINE | Facility: CLINIC | Age: 40
End: 2020-07-24

## 2020-07-24 NOTE — PROGRESS NOTES
Faxed signed cmn for nasal interface to [facility] and copy of RX for cpap supplies.  Copy of cmn sent to [facility] HIM for scan into epic

## 2020-08-12 ENCOUNTER — OFFICE VISIT (OUTPATIENT)
Dept: FAMILY MEDICINE | Facility: CLINIC | Age: 40
End: 2020-08-12
Payer: MEDICARE

## 2020-08-12 VITALS
OXYGEN SATURATION: 96 % | BODY MASS INDEX: 35.09 KG/M2 | HEIGHT: 73 IN | HEART RATE: 89 BPM | RESPIRATION RATE: 18 BRPM | SYSTOLIC BLOOD PRESSURE: 133 MMHG | DIASTOLIC BLOOD PRESSURE: 67 MMHG | WEIGHT: 264.8 LBS | TEMPERATURE: 98.2 F

## 2020-08-12 DIAGNOSIS — L91.8 SKIN TAG: Primary | ICD-10-CM

## 2020-08-12 PROCEDURE — 99213 OFFICE O/P EST LOW 20 MIN: CPT | Performed by: NURSE PRACTITIONER

## 2020-08-12 ASSESSMENT — PAIN SCALES - GENERAL: PAINLEVEL: NO PAIN (0)

## 2020-08-12 ASSESSMENT — MIFFLIN-ST. JEOR: SCORE: 2175

## 2020-08-12 NOTE — PROGRESS NOTES
Subjective     Rafa Cochran is a 40 year old male who presents to clinic today for the following health issues:    HPI       Concern -   Mole on LT side of neck  Onset: yesterday    Description:   Inside of mole grew out into a skin tag, the tip of the mole has a lighter color    Intensity: mild    Progression of Symptoms:  same    Accompanying Signs & Symptoms:  no    Previous history of similar problem:   Yes Yes but none of them have been cancerous and it runs in the family-grandpa had begin skin cancer.    Precipitating factors:   Worsened by: none    Alleviating factors:  Improved by: nothing    Therapies Tried and outcome: nothing    Unsure how long it's been there. No pain. No bleeding. No itching.  Left neck.  He has just noticed it more as when he puts on a shirt he sometimes can feel it.          Patient Active Problem List   Diagnosis     Major depression in partial remission (H)     Bipolar affective disorder (H)     OCD (obsessive compulsive disorder)     Schizoaffective disorder (H)     Sinus tachycardia     Palpitations     Prediabetes     BABATUNDE (obstructive sleep apnea)- moderate (AHI 17)     Pain in joint of right shoulder     Morbid obesity (H)     Gastroesophageal reflux disease, esophagitis presence not specified     Family history of cardiac arrhythmia     H/O esophageal ulcer     Mixed hyperlipidemia     Past Surgical History:   Procedure Laterality Date     APPENDECTOMY       COMBINED ESOPHAGOSCOPY, GASTROSCOPY, DUODENOSCOPY (EGD) WITH CO2 INSUFFLATION N/A 11/25/2019    Procedure: ESOPHAGOGASTRODUODENOSCOPY, WITH CO2 INSUFFLATION;  Surgeon: Sabas Grajeda MD;  Location:  OR     ESOPHAGOSCOPY, GASTROSCOPY, DUODENOSCOPY (EGD), COMBINED N/A 11/25/2019    Procedure: Esophagogastroduodenoscopy, With Biopsy;  Surgeon: Sabas Grajeda MD;  Location:  OR       Social History     Tobacco Use     Smoking status: Former Smoker     Last attempt to quit: 12/25/2013     Years  "since quittin.6     Smokeless tobacco: Never Used     Tobacco comment: 5 cigs per day   Substance Use Topics     Alcohol use: Yes     Alcohol/week: 0.0 standard drinks     Comment: rare use     Family History   Problem Relation Age of Onset     C.A.D. Maternal Grandfather      Diabetes Maternal Grandfather      Hypertension Maternal Grandfather      Heart Disease Maternal Grandfather      C.A.D. Paternal Grandmother      Diabetes Paternal Grandmother      Circulatory Paternal Grandmother      Gastrointestinal Disease Paternal Grandmother      Hypertension Paternal Grandfather      Circulatory Paternal Grandfather      Heart Disease Paternal Grandfather      Gastrointestinal Disease Paternal Grandfather      Heart Failure Paternal Grandfather      Hypertension Father      Alcohol/Drug Father      Allergies Father      Depression Father      Gastrointestinal Disease Father      Prostate Cancer Father      Esophageal Cancer Father      Cerebrovascular Disease Maternal Grandmother      Arthritis Maternal Grandmother      Heart Disease Maternal Grandmother      Alcohol/Drug Mother      Allergies Mother      Circulatory Mother      Depression Mother      Heart Disease Mother      Diabetes Mother      Alcohol/Drug Sister      Allergies Sister      Depression Sister      Genitourinary Problems Sister            Reviewed and updated as needed this visit by Provider  Tobacco  Allergies  Meds  Problems  Med Hx  Surg Hx  Fam Hx         Review of Systems   Skin as above      Objective    /67 (BP Location: Left arm, Patient Position: Sitting, Cuff Size: Adult Large)   Pulse 89   Temp 98.2  F (36.8  C) (Oral)   Resp 18   Ht 1.854 m (6' 1\")   Wt 120.1 kg (264 lb 12.8 oz)   SpO2 96%   BMI 34.94 kg/m    Body mass index is 34.94 kg/m .  Physical Exam   GENERAL: healthy, alert and no distress  SKIN: left neck with small round tan mole with skin tag/extra skin attached. Right neck also similar color skin tag " noted. Small brown moles noted on body that also have similar color.     Diagnostic Test Results:  Labs reviewed in Epic        Assessment & Plan     1. Skin tag  Appears asymptomatic.  Monitor for now, pictures taken today, patient should take a picture in 2 months and send it to provider so we can monitor if it is changing or not.  Does not appear cancerous.  Appears more like a skin tag-see media for picture           Return in about 2 months (around 10/12/2020) for Send [employer] message update.    NAS Smith, NP-C  [facility]

## 2020-08-29 DIAGNOSIS — R13.19 ESOPHAGEAL DYSPHAGIA: ICD-10-CM

## 2020-08-29 DIAGNOSIS — K21.9 GASTROESOPHAGEAL REFLUX DISEASE, ESOPHAGITIS PRESENCE NOT SPECIFIED: ICD-10-CM

## 2020-09-01 NOTE — TELEPHONE ENCOUNTER
Prescription approved per [facility].  Lakisha Moreno RN  [facility]

## 2020-09-17 DIAGNOSIS — G47.33 OSA (OBSTRUCTIVE SLEEP APNEA): ICD-10-CM

## 2020-09-17 DIAGNOSIS — E66.01 MORBID OBESITY (H): ICD-10-CM

## 2020-09-17 NOTE — TELEPHONE ENCOUNTER
Received faxed request with CMN for a replacement machine for Arfa.  Pending order for replacement machine to Dr. Mclaughlin.

## 2020-09-18 NOTE — TELEPHONE ENCOUNTER
Faxed signed CMN, office notes, copy of sleep study as requested, along with order for replacement machine to [facility], ATTN: Ladi in cpap replacement dept. [phone].  Copy of all sent to HIM to be scanned into epic.

## 2020-09-24 ENCOUNTER — OFFICE VISIT (OUTPATIENT)
Dept: FAMILY MEDICINE | Facility: CLINIC | Age: 40
End: 2020-09-24
Payer: MEDICARE

## 2020-09-24 VITALS
TEMPERATURE: 97.6 F | WEIGHT: 264 LBS | BODY MASS INDEX: 34.83 KG/M2 | SYSTOLIC BLOOD PRESSURE: 129 MMHG | OXYGEN SATURATION: 98 % | HEART RATE: 82 BPM | DIASTOLIC BLOOD PRESSURE: 88 MMHG

## 2020-09-24 DIAGNOSIS — K21.9 GASTROESOPHAGEAL REFLUX DISEASE, ESOPHAGITIS PRESENCE NOT SPECIFIED: ICD-10-CM

## 2020-09-24 DIAGNOSIS — R00.2 HEART PALPITATIONS: Primary | ICD-10-CM

## 2020-09-24 DIAGNOSIS — R13.19 ESOPHAGEAL DYSPHAGIA: ICD-10-CM

## 2020-09-24 LAB
ALBUMIN SERPL-MCNC: 4.2 G/DL (ref 3.4–5)
ALP SERPL-CCNC: 107 U/L (ref 40–150)
ALT SERPL W P-5'-P-CCNC: 27 U/L (ref 0–70)
ANION GAP SERPL CALCULATED.3IONS-SCNC: 8 MMOL/L (ref 3–14)
AST SERPL W P-5'-P-CCNC: 13 U/L (ref 0–45)
BILIRUB SERPL-MCNC: 0.3 MG/DL (ref 0.2–1.3)
BUN SERPL-MCNC: 16 MG/DL (ref 7–30)
CALCIUM SERPL-MCNC: 9.2 MG/DL (ref 8.5–10.1)
CHLORIDE SERPL-SCNC: 106 MMOL/L (ref 94–109)
CO2 SERPL-SCNC: 25 MMOL/L (ref 20–32)
CREAT SERPL-MCNC: 0.88 MG/DL (ref 0.66–1.25)
GFR SERPL CREATININE-BSD FRML MDRD: >90 ML/MIN/{1.73_M2}
GLUCOSE SERPL-MCNC: 98 MG/DL (ref 70–99)
POTASSIUM SERPL-SCNC: 3.9 MMOL/L (ref 3.4–5.3)
PROT SERPL-MCNC: 7.9 G/DL (ref 6.8–8.8)
SODIUM SERPL-SCNC: 139 MMOL/L (ref 133–144)
TSH SERPL DL<=0.005 MIU/L-ACNC: 1.01 MU/L (ref 0.4–4)

## 2020-09-24 PROCEDURE — 99214 OFFICE O/P EST MOD 30 MIN: CPT | Performed by: NURSE PRACTITIONER

## 2020-09-24 PROCEDURE — 84443 ASSAY THYROID STIM HORMONE: CPT | Performed by: NURSE PRACTITIONER

## 2020-09-24 PROCEDURE — 80053 COMPREHEN METABOLIC PANEL: CPT | Performed by: NURSE PRACTITIONER

## 2020-09-24 PROCEDURE — 36415 COLL VENOUS BLD VENIPUNCTURE: CPT | Performed by: NURSE PRACTITIONER

## 2020-09-24 RX ORDER — PROPRANOLOL HYDROCHLORIDE 10 MG/1
10 TABLET ORAL 2 TIMES DAILY PRN
Qty: 60 TABLET | Refills: 1 | Status: SHIPPED | OUTPATIENT
Start: 2020-09-24 | End: 2020-09-25

## 2020-09-24 ASSESSMENT — PAIN SCALES - GENERAL: PAINLEVEL: NO PAIN (0)

## 2020-09-24 NOTE — Clinical Note
I need note template please.   Thank you,  NAS Smith, NP-C  [facility]

## 2020-09-24 NOTE — PATIENT INSTRUCTIONS
Increase omeprazole to twice a day for the next 2 weeks to see if helps heart burn, if it does continue twice a day    Start propranolol twice a day as need. Follow up with psychiatrist and let them know you are on this now    Try OTC simethicone for gas    For your constipation:     Food:   Drink 8  6-8 oz glasses of water daily at a minimum  Increase fiber in diet, can also take fiber supplements OTC such as Metamucil   Exercise 150 minutes of moderate exertion a week. This is 30 minutes 5 days a week getting your heart rate up    Medications:  Miralax 17 gm (1 capful) once to twice a day in water (softener, pulls water from colon, make sure drinking lots of fluids)  Senna-s 1-2 tabs once to twice a day (softener/laxative)  Colace 100 mg 1 tab once to twice a day (softener)

## 2020-09-24 NOTE — PROGRESS NOTES
Subjective     Rafa Cochran is a 40 year old male who presents to clinic today for the following health issues:    HPI       Palpitations      PVCs when stressed and right before bed. Got worse when school started up again. He might take a break until spring to work on his anxiety.  Improve with exercise usually. PVCs can be painful. Ativan may help palpitations. Takes the 'tension' away. Saw cardiology in -wasn't worried about abnormal heart monitor as some of the rhythems occurred when he was sleeping. Psychiatrist ([facility]) did not feel propranolol would be helpful, they were working on other stuff this summer. He feels his anxiety is improved from the past but overall better other than the past 3 weeks. School started, COVID getting to him.    Also getting intense reflux into chest. On omeprazole daily.  Increase to twice a day. Has a lump that develops on his abdomen that is sore, and when he touches it it causes nausea. If he lays on his abdomen he feels nausea. Lots of gas pain, more upper abdomen and constipation    Dad  in march. When COVID started. He hadn't had PVCs before march and wondering if he had COVID and didn't know and now has PVCs related to it.           Review of Systems   Constitutional, cardiovascular-as above, pulmonary, gi and gu psych as above, systems are negative, except as otherwise noted.      Objective    /88   Pulse 82   Temp 97.6  F (36.4  C)   Wt 119.7 kg (264 lb)   SpO2 98%   BMI 34.83 kg/m    Body mass index is 34.83 kg/m .  Physical Exam   GENERAL: healthy, alert and no distress  NECK: no adenopathy, no asymmetry, masses, or scars and thyroid normal to palpation  RESP: lungs clear to auscultation - no rales, rhonchi or wheezes  CV: regular rate and rhythm, normal S1 S2, no S3 or S4, no murmur, click or rub  ABDOMEN: soft, nontender, no hepatosplenomegaly, no masses and bowel sounds normal  MS: no gross musculoskeletal defects noted, no edema  PSYCH:  mentation appears normal, affect flat    Results for orders placed or performed in visit on 09/24/20 (from the past 24 hour(s))   Comprehensive metabolic panel (BMP + Alb, Alk Phos, ALT, AST, Total. Bili, TP)   Result Value Ref Range    Sodium 139 133 - 144 mmol/L    Potassium 3.9 3.4 - 5.3 mmol/L    Chloride 106 94 - 109 mmol/L    Carbon Dioxide 25 20 - 32 mmol/L    Anion Gap 8 3 - 14 mmol/L    Glucose 98 70 - 99 mg/dL    Urea Nitrogen 16 7 - 30 mg/dL    Creatinine 0.88 0.66 - 1.25 mg/dL    GFR Estimate >90 >60 mL/min/[1.73_m2]    GFR Estimate If Black >90 >60 mL/min/[1.73_m2]    Calcium 9.2 8.5 - 10.1 mg/dL    Bilirubin Total 0.3 0.2 - 1.3 mg/dL    Albumin 4.2 3.4 - 5.0 g/dL    Protein Total 7.9 6.8 - 8.8 g/dL    Alkaline Phosphatase 107 40 - 150 U/L    ALT 27 0 - 70 U/L    AST 13 0 - 45 U/L   TSH with free T4 reflex   Result Value Ref Range    TSH 1.01 0.40 - 4.00 mU/L           Assessment & Plan     Heart palpitations  Labs stable. Start propranolol 10 mg BID prn. Follow up with psych: your anxiety is not controlled and you need to work on this  - Comprehensive metabolic panel (BMP + Alb, Alk Phos, ALT, AST, Total. Bili, TP)  - COVID-19 Virus (Coronavirus) Antibody & Titer Reflex; Future  - TSH with free T4 reflex    Gastroesophageal reflux disease, esophagitis presence not specified  Increase omeprazole to 20 mg BID for 2 weeks to see if helps    Esophageal dysphagia  As above         Return in about 2 weeks (around 10/8/2020), or if symptoms worsen or fail to improve.    NAS Smith, NP-C  [facility]

## 2020-09-25 ENCOUNTER — TELEPHONE (OUTPATIENT)
Dept: FAMILY MEDICINE | Facility: CLINIC | Age: 40
End: 2020-09-25

## 2020-09-25 DIAGNOSIS — R00.2 HEART PALPITATIONS: ICD-10-CM

## 2020-09-25 RX ORDER — PROPRANOLOL HYDROCHLORIDE 10 MG/1
10 TABLET ORAL 2 TIMES DAILY PRN
Qty: 180 TABLET | Refills: 0 | Status: SHIPPED | OUTPATIENT
Start: 2020-09-25 | End: 2020-12-30

## 2020-09-25 NOTE — RESULT ENCOUNTER NOTE
Alvarado Craig,   Labs look good.   Thank you,  NAS Smith, NP-C  [facility]

## 2020-09-25 NOTE — TELEPHONE ENCOUNTER
Pharmacy faxing back script with request to fill for 90 day supply, #180:    propranolol (INDERAL) 10 MG tablet  60 tablet  1  9/24/2020

## 2020-09-30 ENCOUNTER — TELEPHONE (OUTPATIENT)
Dept: FAMILY MEDICINE | Facility: CLINIC | Age: 40
End: 2020-09-30

## 2020-10-01 DIAGNOSIS — R00.2 HEART PALPITATIONS: ICD-10-CM

## 2020-10-01 PROCEDURE — 36415 COLL VENOUS BLD VENIPUNCTURE: CPT | Performed by: NURSE PRACTITIONER

## 2020-10-01 PROCEDURE — 86769 SARS-COV-2 COVID-19 ANTIBODY: CPT | Performed by: NURSE PRACTITIONER

## 2020-10-01 NOTE — TELEPHONE ENCOUNTER
Patient called back and questions answered. Rescheduled the patient for lab as the Covid serology was not drawn with his other labs on 9/24/2020.    As reviewed that the omeprazole is to be tried at 20 mg BID for 2 weeks.    Marifer Arndt RN, [facility] Triage      
Reason for Call:  Other appointment    Detailed comments: PT has questions and concerns about his 09/24/2020 appointment and lab results    Phone Number Patient can be reached at: Cell number on file:    Telephone Information:   Mobile [phone]       Best Time: Anytime.    Can we leave a detailed message on this number? YES    Call taken on 9/30/2020 at 5:21 PM by Patricia Forte    
This writer attempted to contact Rafa on 10/01/20      Reason for call clarify questions patient has and left message.      If patient calls back:   Registered Nurse called. Follow Triage Call workflow        Marifer Arndt RN    
Telemetry

## 2020-10-02 LAB
COVID-19 SPIKE RBD ABY TITER: NORMAL
COVID-19 SPIKE RBD ABY: NEGATIVE

## 2020-10-05 NOTE — RESULT ENCOUNTER NOTE
Alvarado Craig,   Your COVID-19 antibody was negative.   Thank you,  NAS Smith, NP-C  [facility]

## 2020-10-14 ENCOUNTER — MEDICAL CORRESPONDENCE (OUTPATIENT)
Dept: HEALTH INFORMATION MANAGEMENT | Facility: CLINIC | Age: 40
End: 2020-10-14

## 2020-10-14 ENCOUNTER — OFFICE VISIT (OUTPATIENT)
Dept: FAMILY MEDICINE | Facility: CLINIC | Age: 40
End: 2020-10-14
Payer: MEDICARE

## 2020-10-14 VITALS
BODY MASS INDEX: 35.25 KG/M2 | TEMPERATURE: 97.1 F | HEIGHT: 73 IN | SYSTOLIC BLOOD PRESSURE: 117 MMHG | HEART RATE: 89 BPM | WEIGHT: 266 LBS | OXYGEN SATURATION: 97 % | DIASTOLIC BLOOD PRESSURE: 78 MMHG

## 2020-10-14 DIAGNOSIS — M25.551 HIP PAIN, RIGHT: ICD-10-CM

## 2020-10-14 DIAGNOSIS — G47.33 OSA (OBSTRUCTIVE SLEEP APNEA): Primary | Chronic | ICD-10-CM

## 2020-10-14 PROCEDURE — 99213 OFFICE O/P EST LOW 20 MIN: CPT | Performed by: NURSE PRACTITIONER

## 2020-10-14 RX ORDER — GABAPENTIN 100 MG/1
300 CAPSULE ORAL 3 TIMES DAILY
COMMUNITY
Start: 2020-10-05 | End: 2022-04-19

## 2020-10-14 ASSESSMENT — PAIN SCALES - GENERAL: PAINLEVEL: NO PAIN (0)

## 2020-10-14 ASSESSMENT — MIFFLIN-ST. JEOR: SCORE: 2180.45

## 2020-10-14 NOTE — PROGRESS NOTES
"Subjective     Rafa Cochran is a 40 year old male who presents to clinic today for the following health issues:    HPI         Need cpap machine order: same settings working fine. Helps him sleep well.  Machine is so old, it is not working well and companies no longer making those parts. He needs a letter stating the use of a machine helps him sleep and improves his sleep apnea, and then insurance will order a new one with same settings.  Last sleep study in 2014. He last saw the sleep specialist 5/26/2020 who agreed he should continue to use a cpap machine.     Fax letter and chart note to [phone]    Is on gabapentin-was told PPI causes reduced absorption. He is waiting 2 hr between omeprazole and gabapentin. Feels it is helping.    Withdrew from class, palpitations are improved. Not taking the propranolol    Wondering about PT, has pain in right hip, walking some. Wondering if working on strengthening his legs will help    Only taking omeprazole in the morning and feels it is helpful        Review of Systems   Constitutional, cardiovascular, pulmonary, MS systems are negative, except as otherwise noted.      Objective    /78 (BP Location: Left arm, Patient Position: Chair, Cuff Size: Adult Large)   Pulse 89   Temp 97.1  F (36.2  C) (Tympanic)   Ht 1.854 m (6' 1\")   Wt 120.7 kg (266 lb)   SpO2 97%   BMI 35.09 kg/m    Body mass index is 35.09 kg/m .  Physical Exam   GENERAL: healthy, alert and no distress  RESP: lungs clear to auscultation - no rales, rhonchi or wheezes  CV: regular rate and rhythm, normal S1 S2, no S3 or S4, no murmur, click or rub  MS: no gross musculoskeletal defects noted, no edema    No results found for this or any previous visit (from the past 24 hour(s)).        Assessment & Plan     BABATUNDE (obstructive sleep apnea)- moderate (AHI 17)  Letter written, needs new machine, same settings working well.     Hip pain, right  Follow up with PT  - PHYSICAL THERAPY REFERRAL; Future    "     Return in about 2 months (around 12/14/2020), or if symptoms worsen or fail to improve.    NAS Smith, NP-C  [facility]

## 2020-10-14 NOTE — Clinical Note
Please Fax letter and chart note to [phone]  Thank you,  NAS Smith, NP-C  [facility]

## 2020-10-14 NOTE — PATIENT INSTRUCTIONS
Okay to only take omeprazole 30 min before breakfast. If you feel heartburn is worsening in evening, okay to take 2nd dose 30 min before dinner    Separate gabapentin and omeprazole by 2 hours

## 2020-10-14 NOTE — LETTER
October 14, 2020      Rafa Cochran  [address]  [place] [address]        To Whom It May Concern,       Rafa uses a cpap nightly for his sleep apnea and Rafa benefits from it from improved sleep. He needs a new cpap machine as his old one is not working as well and parts are no longer made for his model.     Sincerely,        NAS Smith, NP-C  [facility]

## 2020-10-22 ENCOUNTER — TELEPHONE (OUTPATIENT)
Dept: FAMILY MEDICINE | Facility: CLINIC | Age: 40
End: 2020-10-22

## 2020-10-22 NOTE — TELEPHONE ENCOUNTER
[facility] forms placed in [facility] for completion.    Michelle HARRINGTON, Patient Care

## 2020-10-22 NOTE — TELEPHONE ENCOUNTER
Forms signed. Please scan into chart then fax back to [facility].  Thank you,  NAS Smith, NP-C  [facility]

## 2020-10-23 ENCOUNTER — THERAPY VISIT (OUTPATIENT)
Dept: PHYSICAL THERAPY | Facility: CLINIC | Age: 40
End: 2020-10-23
Payer: MEDICARE

## 2020-10-23 DIAGNOSIS — M25.551 HIP PAIN, RIGHT: ICD-10-CM

## 2020-10-23 PROCEDURE — 97110 THERAPEUTIC EXERCISES: CPT | Mod: GP

## 2020-10-23 PROCEDURE — 97161 PT EVAL LOW COMPLEX 20 MIN: CPT | Mod: GP

## 2020-10-23 ASSESSMENT — ACTIVITIES OF DAILY LIVING (ADL)
SITTING_FOR_15_MINUTES: NO DIFFICULTY AT ALL
WALKING_INITIALLY: NO DIFFICULTY AT ALL
GETTING_INTO_AND_OUT_OF_A_BATHTUB: NO DIFFICULTY AT ALL
HEAVY_WORK: NO DIFFICULTY AT ALL
RECREATIONAL_ACTIVITIES: NO DIFFICULTY AT ALL
TWISTING/PIVOTING_ON_INVOLVED_LEG: NO DIFFICULTY AT ALL
GOING_DOWN_1_FLIGHT_OF_STAIRS: NO DIFFICULTY AT ALL
PUTTING_ON_SOCKS_AND_SHOES: NO DIFFICULTY AT ALL
WALKING_DOWN_STEEP_HILLS: NO DIFFICULTY AT ALL
WALKING_UP_STEEP_HILLS: NO DIFFICULTY AT ALL
HOS_ADL_ITEM_SCORE_TOTAL: 68
STEPPING_UP_AND_DOWN_CURBS: NO DIFFICULTY AT ALL
DEEP_SQUATTING: NO DIFFICULTY AT ALL
WALKING_APPROXIMATELY_10_MINUTES: NO DIFFICULTY AT ALL
HOS_ADL_SCORE(%): 100
STANDING_FOR_15_MINUTES: NO DIFFICULTY AT ALL
GOING_UP_1_FLIGHT_OF_STAIRS: NO DIFFICULTY AT ALL
HOS_ADL_HIGHEST_POTENTIAL_SCORE: 68
LIGHT_TO_MODERATE_WORK: NO DIFFICULTY AT ALL
WALKING_15_MINUTES_OR_GREATER: NO DIFFICULTY AT ALL
ROLLING_OVER_IN_BED: NO DIFFICULTY AT ALL
GETTING_INTO_AND_OUT_OF_AN_AVERAGE_CAR: NO DIFFICULTY AT ALL
HOS_ADL_COUNT: 17
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
## 2020-10-29 DIAGNOSIS — R13.19 ESOPHAGEAL DYSPHAGIA: ICD-10-CM

## 2020-10-30 ENCOUNTER — THERAPY VISIT (OUTPATIENT)
Dept: PHYSICAL THERAPY | Facility: CLINIC | Age: 40
End: 2020-10-30
Payer: MEDICARE

## 2020-10-30 DIAGNOSIS — M25.551 HIP PAIN, RIGHT: ICD-10-CM

## 2020-10-30 PROCEDURE — 97110 THERAPEUTIC EXERCISES: CPT | Mod: GP

## 2020-10-30 PROCEDURE — 97530 THERAPEUTIC ACTIVITIES: CPT | Mod: GP

## 2020-10-30 NOTE — TELEPHONE ENCOUNTER
90 day supply with 2 refills sent on 9/1/2020. Should have refills on file at pharmacy.      Cassidy Barber RN, BSN, PHN

## 2020-11-06 ENCOUNTER — THERAPY VISIT (OUTPATIENT)
Dept: PHYSICAL THERAPY | Facility: CLINIC | Age: 40
End: 2020-11-06
Payer: MEDICARE

## 2020-11-06 DIAGNOSIS — M25.551 HIP PAIN, RIGHT: ICD-10-CM

## 2020-11-06 PROCEDURE — 97035 APP MDLTY 1+ULTRASOUND EA 15: CPT | Mod: GP

## 2020-11-06 PROCEDURE — 97110 THERAPEUTIC EXERCISES: CPT | Mod: GP

## 2020-11-10 ENCOUNTER — TELEPHONE (OUTPATIENT)
Dept: FAMILY MEDICINE | Facility: CLINIC | Age: 40
End: 2020-11-10

## 2020-11-10 NOTE — TELEPHONE ENCOUNTER
[facility] forms placed in [facility] for completion.    Michelle HARRINGTON, Patient Care

## 2020-11-11 NOTE — TELEPHONE ENCOUNTER
Forms signed. Please scan into chart and fax back to [facility].   Thank you,  NAS Smith, NP-C  [facility]

## 2020-11-17 ENCOUNTER — TELEPHONE (OUTPATIENT)
Dept: FAMILY MEDICINE | Facility: CLINIC | Age: 40
End: 2020-11-17

## 2020-11-18 NOTE — TELEPHONE ENCOUNTER
Please stamp with clinic address, scan into chart and fax along with demographics sheet and provider note from 10/14/2020 to [facility].   Thank you,  NAS Smith, NP-C  [facility]

## 2020-12-07 ENCOUNTER — TELEPHONE (OUTPATIENT)
Dept: FAMILY MEDICINE | Facility: CLINIC | Age: 40
End: 2020-12-07

## 2020-12-07 ENCOUNTER — MEDICAL CORRESPONDENCE (OUTPATIENT)
Dept: HEALTH INFORMATION MANAGEMENT | Facility: CLINIC | Age: 40
End: 2020-12-07

## 2020-12-07 NOTE — TELEPHONE ENCOUNTER
[employer] form faxed to Francoise An NP via [employer]. for completion    TERRENCE Davis.

## 2020-12-07 NOTE — TELEPHONE ENCOUNTER
[employer] form stamped, 10/14/20 OV notes printed/attached. Form & OV notes placed in Dr. Murphy 's red folder in provider's bin for co signature        Loida CHAHAL (R))

## 2020-12-07 NOTE — TELEPHONE ENCOUNTER
Please print providers office note from 10/14/2020, have an in person MD sign the note, along with co-signing the DME form that was faxed back to staff (it also needs clinic stamp/address on it), then scan form into chart and fax provider signed note along with DME form back to [employer].     Thank you,  NAS Smith, NP-C  [facility]

## 2020-12-14 ENCOUNTER — HEALTH MAINTENANCE LETTER (OUTPATIENT)
Age: 40
End: 2020-12-14

## 2021-01-08 PROBLEM — M25.551 HIP PAIN, RIGHT: Status: RESOLVED | Noted: 2020-10-23 | Resolved: 2021-01-08

## 2021-01-08 NOTE — PROGRESS NOTES
Discharge Note    Progress reporting period is from initial evaluation date (please see noted date below) to Nov 6, 2020.  Linked Episodes   Type: Episode: Status: Noted: Resolved: Last update: Updated by:   PHYSICAL THERAPY R hip pain Active 10/23/2020  11/6/2020  1:05 PM Bipin Batista, PT      Comments:       Rafa failed to follow up and current status is unknown.  Please see information below for last relevant information on current status.  Patient seen for 3 visits.    SUBJECTIVE  Subjective changes noted by patient:  Overall a little better; still sore with some activities   .  Current pain level is  .     Previous pain level was   .   Changes in function:  Yes (See Goal flowsheet attached for changes in current functional level)  Adverse reaction to treatment or activity: None    OBJECTIVE  Changes noted in objective findings: still TTP lateral R hip;      ASSESSMENT/PLAN  Diagnosis: R hip pain; s&sx suggest possible OA changes; possible CARLA   Updated problem list and treatment plan:   Pain - HEP  STG/LTGs have been met or progress has been made towards goals:  Yes, please see goal flowsheet for most current information  Assessment of Progress: current status is unknown.    Last current status:     Self Management Plans:  HEP  I have re-evaluated this patient and find that the nature, scope, duration and intensity of the therapy is appropriate for the medical condition of the patient.  Rafa continues to require the following intervention to meet STG and LTG's:  HEP.    Recommendations:  Discharge with current home program.  Patient to follow up with MD as needed.    Please refer to the daily flowsheet for treatment today, total treatment time and time spent performing 1:1 timed codes.

## 2021-01-12 ENCOUNTER — TELEPHONE (OUTPATIENT)
Dept: FAMILY MEDICINE | Facility: CLINIC | Age: 41
End: 2021-01-12

## 2021-01-12 NOTE — TELEPHONE ENCOUNTER
[facility] forms placed in [facility] for completion.    Michelle HARRINGTON, Patient Care

## 2021-01-13 NOTE — TELEPHONE ENCOUNTER
Both forms signed. Please scan into chart then fax back to [facility].  Thank you,  NAS Smith, NP-C  [facility]

## 2021-03-16 ENCOUNTER — NURSE TRIAGE (OUTPATIENT)
Dept: NURSING | Facility: CLINIC | Age: 41
End: 2021-03-16

## 2021-03-16 NOTE — TELEPHONE ENCOUNTER
"~6 pm - Rafa reports he suddenly experienced a \"nasty\" pain to the center of his chest when breathing in. This has been happening repeatedly. It is difficult to take a deep breath.    The pain will momentarily radiate to his shoulders and neck  He also reports that this has been accompanied by \"bouts of pressure\" in his chest    He states that he has a history of GI and esophageal problems in the past. This feels different, \"deeper and more visceral\"    During triage he states, \"Now I'm starting to feel nauseous\"    ER advised    COVID 19 Nurse Triage Plan/Patient Instructions    Please be aware that novel coronavirus (COVID-19) may be circulating in the community. If you develop symptoms such as fever, cough, or SOB or if you have concerns about the presence of another infection including coronavirus (COVID-19), please contact your health care provider or visit [email or web address].     Disposition/Instructions    ED Visit recommended. Follow protocol based instructions.     Bring Your Own Device:  Please also bring your smart device(s) (smart phones, tablets, laptops) and their charging cables for your personal use and to communicate with your care team during your visit.    Thank you for taking steps to prevent the spread of this virus.  o Limit your contact with others.  o Wear a simple mask to cover your cough.  o Wash your hands well and often.    Resources    [facility]: About COVID-19: [email or web address]    CDC: What to Do If You're Sick: www.cdc.gov/coronavirus/2019-ncov/about/steps-when-sick.html    CDC: Ending Home Isolation: www.cdc.gov/coronavirus/2019-ncov/hcp/disposition-in-home-patients.html     CDC: Caring for Someone: www.cdc.gov/coronavirus/2019-ncov/if-you-are-sick/care-for-someone.html     MD: Interim Guidance for Hospital Discharge to Home: www.health.[facility].mn.us/diseases/coronavirus/hcp/hospdischarge.pdf    [facility] clinical trials (COVID-19 " "research studies): [email or web address]     Below are the [place]-19 hotlines at the [place] [facility] ([facility]). Interpreters are available.   o For health questions: Call 323-924-0343 or 1-952.675.6313 (7 a.m. to 7 p.m.)  o For questions about schools and childcare: Call 546-798-6447 or 1-341.701.5238 (7 a.m. to 7 p.m.)     Eileen Mcmillan RN  [facility]      Reason for Disposition    Pain also present in shoulder(s) or arm(s) or jaw  (Exception: pain is clearly made worse by movement)    Additional Information    Negative: Severe difficulty breathing (e.g., struggling for each breath, speaks in single words)    Negative: Difficult to awaken or acting confused (e.g., disoriented, slurred speech)    Negative: Shock suspected (e.g., cold/pale/clammy skin, too weak to stand, low BP, rapid pulse)    Negative: [1] Chest pain lasts > 5 minutes AND [2] history of heart disease  (i.e., heart attack, bypass surgery, angina, angioplasty, CHF; not just a heart murmur)    Negative: [1] Chest pain lasts > 5 minutes AND [2] described as crushing, pressure-like, or heavy    Negative: [1] Chest pain lasts > 5 minutes AND [2] age > 50    Negative: [1] Chest pain lasts > 5 minutes AND [2] age > 30 AND [3] at least one cardiac risk factor (i.e., hypertension, diabetes, obesity, smoker or strong family history of heart disease)    Negative: [1] Chest pain lasts > 5 minutes AND [2] not relieved with nitroglycerin    Negative: Passed out (i.e., lost consciousness, collapsed and was not responding)    Negative: Heart beating < 50 beats per minute OR > 140 beats per minute    Negative: Visible sweat on face or sweat dripping down face    Negative: Sounds like a life-threatening emergency to the triager    Negative: SEVERE chest pain    Negative: [1] Intermittent  chest pain or \"angina\" AND [2] increasing in severity or frequency  (Exception: pains lasting a few seconds)    Protocols " used: CHEST PAIN-A-AH

## 2021-03-18 ENCOUNTER — MYC MEDICAL ADVICE (OUTPATIENT)
Dept: FAMILY MEDICINE | Facility: CLINIC | Age: 41
End: 2021-03-18

## 2021-03-19 ENCOUNTER — E-VISIT (OUTPATIENT)
Dept: URGENT CARE | Facility: URGENT CARE | Age: 41
End: 2021-03-19
Payer: MEDICARE

## 2021-03-19 DIAGNOSIS — J02.9 SORE THROAT: Primary | ICD-10-CM

## 2021-03-19 PROCEDURE — 99207 PR NO CHARGE LOS: CPT | Performed by: PHYSICIAN ASSISTANT

## 2021-03-20 NOTE — PATIENT INSTRUCTIONS
Dear Rafa Cochran,    We are sorry you are not feeling well. Based on the responses you provided, it is recommended that you be seen in-person in urgent care so we can better evaluate your symptoms. Please click here to find the nearest urgent care location to you.   You will not be charged for this Visit. Thank you for trusting us with your care.    Celestina Zamora PA-C

## 2021-04-17 ENCOUNTER — HEALTH MAINTENANCE LETTER (OUTPATIENT)
Age: 41
End: 2021-04-17

## 2021-04-26 ENCOUNTER — ANCILLARY PROCEDURE (OUTPATIENT)
Dept: GENERAL RADIOLOGY | Facility: CLINIC | Age: 41
End: 2021-04-26
Attending: NURSE PRACTITIONER
Payer: MEDICARE

## 2021-04-26 ENCOUNTER — OFFICE VISIT (OUTPATIENT)
Dept: FAMILY MEDICINE | Facility: CLINIC | Age: 41
End: 2021-04-26
Payer: MEDICARE

## 2021-04-26 VITALS
OXYGEN SATURATION: 96 % | BODY MASS INDEX: 38.17 KG/M2 | WEIGHT: 288 LBS | DIASTOLIC BLOOD PRESSURE: 94 MMHG | TEMPERATURE: 97.8 F | RESPIRATION RATE: 16 BRPM | HEIGHT: 73 IN | HEART RATE: 106 BPM | SYSTOLIC BLOOD PRESSURE: 149 MMHG

## 2021-04-26 DIAGNOSIS — R14.0 ABDOMINAL BLOATING: ICD-10-CM

## 2021-04-26 DIAGNOSIS — K21.9 GASTROESOPHAGEAL REFLUX DISEASE, UNSPECIFIED WHETHER ESOPHAGITIS PRESENT: Primary | Chronic | ICD-10-CM

## 2021-04-26 DIAGNOSIS — F43.21 GRIEF REACTION: ICD-10-CM

## 2021-04-26 DIAGNOSIS — E78.2 MIXED HYPERLIPIDEMIA: Chronic | ICD-10-CM

## 2021-04-26 DIAGNOSIS — R73.03 PREDIABETES: ICD-10-CM

## 2021-04-26 PROCEDURE — 99214 OFFICE O/P EST MOD 30 MIN: CPT | Performed by: NURSE PRACTITIONER

## 2021-04-26 PROCEDURE — 74019 RADEX ABDOMEN 2 VIEWS: CPT | Performed by: RADIOLOGY

## 2021-04-26 ASSESSMENT — MIFFLIN-ST. JEOR: SCORE: 2275.24

## 2021-04-26 ASSESSMENT — PAIN SCALES - GENERAL: PAINLEVEL: NO PAIN (1)

## 2021-04-26 NOTE — PATIENT INSTRUCTIONS
Patient Education     Helping Yourself Through Grief and Loss  Do what you can to stay healthy. Reaching out for support will help a great deal. You may find yourself asking:  Why?  It s normal to seek meaning by asking questions, but there s not always an answer for loss. With time, your loss may still be part of your life, but not the only thing in it.    Take care of yourself  Taking good care of yourself helps your body heal from the physical and emotional symptoms of grief. Pay extra attention to healthy exercise, sleep, and eating routines. What else do you need to feel better? Having family around can help you feel loved. Or you might need a walk or movie with friends to take your mind off things for a little while.  Accept support  Joining the world again is part of healing. These tips may help:    Stay in touch with family and friends, even if it s hard to talk.    Tell people how they can help. It can be as simple as bringing you a meal or walking your dog.    Stick to a daily routine that keeps you connected to friends and family.    Try to avoid making major decisions.    Attend a support group of people who have been through the same type of loss.  When to get help  There's no normal length of time to grieve. But if you feel stuck and unable to move on, or if it has been 6 months or more since your loss and you still have signs of grief listed below, it may be time for professional help. Seeking professional help is not a sign of weakness. It means that you are taking responsibility for your recovery. Be alert to depression and call your healthcare provider if you:    Can t go to work or take care of the kids.    Can t eat or sleep normally.    Feel helpless, hopeless, or worthless.    Lose interest in hobbies, friends, and activities that used to give pleasure.    Gain or lose a lot of weight.    Feel your grief is getting worse, or not getting any better.    Have repeated thoughts of suicide or of  harming yourself. Call 911 or a crisis hotline (you can find one online) if you have these thoughts.  At some point, you ll begin thinking about the future. You ll want to look ahead and make plans. To help yourself reach this point, try to do one thing each day to join in life. Keep at it, even if it feels strange at first. Your life can never be exactly the same. But one day you ll find you re living life fully again.  Clouli last reviewed this educational content on 2/1/2018 2000-2021 [employer]. All rights reserved. This information is not intended as a substitute for professional medical care. Always follow your healthcare professional's instructions.           Patient Education     GERD (Adult)    The esophagus is a tube that carries food from the mouth to the stomach. A valve (the LES, lower esophageal sphincter) at the lower end of the esophagus prevents stomach acid from flowing upward. When this valve doesn't work properly, stomach contents may repeatedly flow back up (reflux) into the esophagus. This is called gastroesophageal reflux disease (GERD). GERD can irritate the esophagus. It can cause problems with pain, swallowing or breathing. In severe cases, GERD can cause recurrent pneumonia (from aspiration or breathing in particles) or other serious problems.  Symptoms of reflux include burning, pressure or sharp pain in the upper abdomen or mid to lower chest. The pain can spread to the neck, back, or shoulder. There may be belching, an acid taste in the back of the throat, chronic cough, or sore throat, or hoarseness. GERD symptoms often occur during the day after a big meal. They can also occur at night when lying down.   Home care  Lifestyle changes can help reduce symptoms. If needed, your healthcare provider may prescribe medicines. Symptoms often improve with treatment, but if treatment is stopped, the symptoms often return after a few months. So most persons with GERD will need  "to continue treatment or get treatment on and off.  Lifestyle changes    Limit or avoid fatty, fried, and spicy foods, as well as coffee, chocolate, mint, and foods with high acid content such as tomatoes and citrus fruit and juices (orange, grapefruit, lemon).    Don t eat large meals, especially at night. Frequent, smaller meals are best. Don't lie down right after eating. And don t eat anything 3 hours before going to bed.    Don't drink alcohol or smoke. As much as possible, stay away from second hand smoke.    If you are overweight, losing weight will reduce symptoms.     Don't wear tight clothing around your stomach area.    If your symptoms occur during sleep, use a foam wedge to elevate your upper body (not just your head.) Or, place 4\" blocks under the head of your bed. Or use 2 bed risers under your bedframe.  Medicines  If needed, medicines can help relieve the symptoms of GERD and prevent damage to the esophagus. Discuss a medicine plan with your healthcare provider. This may include one or more of the following medicines:    Antacids to help neutralize the normal acids in your stomach.    Acid blockers (Histamine or H2 blockers) to decrease acid production.    Acid inhibitors (proton pump inhibitors PPIs) to decrease acid production in a different way than the blockers. They may work better, but can take a little longer to take effect.  Take an antacid 30 to 60 minutes after eating and at bedtime, but not at the same time as an acid blocker.  Try not to take medicines such as ibuprofen and aspirin. If you are taking aspirin for your heart or other medical reasons, talk to your healthcare provider about stopping it.  Follow-up care  Follow up with your healthcare provider or as advised by our staff.  When to seek medical advice  Call your healthcare provider if any of the following occur:    Stomach pain gets worse or moves to the lower right abdomen (appendix area)    Chest pain appears or gets worse, " or spreads to the back, neck, shoulder, or arm    An over-the-counter trial of medicine doesn't relieve your symptoms    Weight loss that can't be explained    Trouble or pain swallowing    Frequent vomiting (can t keep down liquids)    Blood in the stool or vomit (red or black in color)    Feeling weak or dizzy    Fever of 100.4 F (38 C) or higher, or as directed by your healthcare provider  Ortega last reviewed this educational content on 3/1/2018    1493-6073 [employer]. All rights reserved. This information is not intended as a substitute for professional medical care. Always follow your healthcare professional's instructions.

## 2021-04-26 NOTE — PROGRESS NOTES
"    Assessment & Plan     Gastroesophageal reflux disease, unspecified whether esophagitis present  Continue Prilosec daily (has not been taking it recently), return to clinic if not improved, new, or worsening symptoms.     Mixed hyperlipidemia  Due for lab, treat as indicated.   - Lipid panel reflex to direct LDL Fasting    Abdominal bloating  No acute abnormalities on KUB by my independent read.  - XR KUB    Prediabetes  Getting A1c, encouraged regular exercise to help with stress, glucose management    Grief reaction  Offered counseling but he declined this, he agrees to return to clinic if not improved, new, or worsening symptoms.  No SI/HI.           BMI:   Estimated body mass index is 38 kg/m  as calculated from the following:    Height as of this encounter: 1.854 m (6' 1\").    Weight as of this encounter: 130.6 kg (288 lb).   Weight management plan: Discussed healthy diet and exercise guidelines    Work on weight loss  Regular exercise  See Patient Instructions    Return in about 4 weeks (around 5/24/2021), or if symptoms worsen or fail to improve, for Routine preventive.    NAS Rodriguez CNP  [facility]    Jamar Craig is a 41 year old who presents for the following health issues     HPI     Chest Pain  Onset/Duration: 1 month  Description:   Location: mid chest  Character: sharp, throbbing  Radiation: Shoulder to shoulder across chest  Duration: waxing and waning   Intensity: severe  Progression of Symptoms: waxing and waning, intermittent  Accompanying Signs & Symptoms:  Shortness of breath: YES  Sweating: no  Nausea/vomiting: no  Lightheadedness: YES  Palpitations: YES  Fever/Chills: no  Cough: YES           Heartburn: YES  History:   Family history of heart disease: YES- mother with mitral heart valve prolapse  Tobacco use: no  Previous similar symptoms: YES  Precipitating factors:   Worse with exertion: no  Worse with deep breaths: no           Related to " "eating: YES           Better with burping: YES  Alleviating factors: certain positions with laying down hears cracking and popping in chest  Therapies tried and outcome: Tylenol and walking sometimes help. He has history of esophageal ulcer    Hyperlipidemia Follow-Up      Are you regularly taking any medication or supplement to lower your cholesterol?   No    Are you having muscle aches or other side effects that you think could be caused by your cholesterol lowering medication?  No    He also reports that his dog  a few days ago.  He states he is very sad and missing his Dad even more (he  3/2020).  He endorses increased stress due to finals (studying [employer] at the [employer]).      How many servings of fruits and vegetables do you eat daily?  2-3    On average, how many sweetened beverages do you drink each day (Examples: soda, juice, sweet tea, etc.  Do NOT count diet or artificially sweetened beverages)?   1    How many days per week do you exercise enough to make your heart beat faster? 4    How many minutes a day do you exercise enough to make your heart beat faster? 10 - 19    How many days per week do you miss taking your medication? 0      Review of Systems   Constitutional, HEENT, cardiovascular, pulmonary, gi and gu systems are negative, except as otherwise noted.      Objective    BP (!) 149/94 (BP Location: Right arm, Patient Position: Sitting, Cuff Size: Adult Large)   Pulse 106   Temp 97.8  F (36.6  C) (Oral)   Resp 16   Ht 1.854 m (6' 1\")   Wt 130.6 kg (288 lb)   SpO2 96%   BMI 38.00 kg/m    Body mass index is 38 kg/m .  Physical Exam   GENERAL: healthy, alert and no distress  EYES: Eyes grossly normal to inspection, PERRL and conjunctivae and sclerae normal  HENT: ear canals and TM's normal, nose and mouth without ulcers or lesions  NECK: no adenopathy, no asymmetry, masses, or scars and thyroid normal to palpation  RESP: lungs clear to auscultation - no rales, rhonchi or " wheezes  CV: regular rate and rhythm, normal S1 S2, no S3 or S4, no murmur, click or rub, no peripheral edema and peripheral pulses strong  ABDOMEN: soft, nontender, no hepatosplenomegaly, no masses and bowel sounds normal  MS: no gross musculoskeletal defects noted, no edema  SKIN: no suspicious lesions or rashes  NEURO: Normal strength and tone, mentation intact and speech normal  BACK: no CVA tenderness, no paralumbar tenderness  PSYCH: mentation appears normal, affect normal/bright  LYMPH: no cervical adenopathy    Results for orders placed or performed in visit on 04/26/21   XR KUB     Status: None    Narrative    XR KUB   4/26/2021 4:20 PM     HISTORY: eval for constipation; Abdominal bloating    COMPARISON: None.      Impression    IMPRESSION: The entire abdomen is not included. The visualized bowel  gas pattern is nonobstructive. Average stool volume.    ISABELLA ARROYO MD

## 2021-05-05 ENCOUNTER — MYC MEDICAL ADVICE (OUTPATIENT)
Dept: FAMILY MEDICINE | Facility: CLINIC | Age: 41
End: 2021-05-05

## 2021-05-06 NOTE — TELEPHONE ENCOUNTER
Provider to address continuation of the change in dose.     Marifer Arndt RN, [facility] Triage

## 2021-06-15 ENCOUNTER — VIRTUAL VISIT (OUTPATIENT)
Dept: FAMILY MEDICINE | Facility: CLINIC | Age: 41
End: 2021-06-15
Payer: MEDICARE

## 2021-06-15 DIAGNOSIS — D22.9 ATYPICAL MOLE: ICD-10-CM

## 2021-06-15 DIAGNOSIS — R13.19 ESOPHAGEAL DYSPHAGIA: ICD-10-CM

## 2021-06-15 DIAGNOSIS — R12 HEART BURN: ICD-10-CM

## 2021-06-15 DIAGNOSIS — R07.9 CHEST PAIN, UNSPECIFIED TYPE: Primary | ICD-10-CM

## 2021-06-15 PROCEDURE — 99214 OFFICE O/P EST MOD 30 MIN: CPT | Mod: 95 | Performed by: NURSE PRACTITIONER

## 2021-06-15 RX ORDER — METHOCARBAMOL 750 MG/1
750 TABLET, FILM COATED ORAL 2 TIMES DAILY PRN
Qty: 60 TABLET | Refills: 2 | Status: SHIPPED | OUTPATIENT
Start: 2021-06-15 | End: 2021-10-29

## 2021-06-15 NOTE — PROGRESS NOTES
Rafa is a 41 year old who is being evaluated via a billable video visit.      How would you like to obtain your AVS? MyChart  If the video visit is dropped, the invitation should be resent by: Text to cell phone: [phone]  Will anyone else be joining your video visit? No      doximity was used. Rahel was not working.     Assessment & Plan     Chest pain, unspecified type  Seems improved with muscle relaxer, continue for now. He is using about once a day in the morning, does not make him tired  - methocarbamol (ROBAXIN) 750 MG tablet; Take 1 tablet (750 mg) by mouth 2 times daily as needed for muscle spasms    Esophageal dysphagia/Heart burn  Refill given, will change to BID dosing as he feels he is still having some breakthrough heartburn. Also feels he is having a deeper, visceral discomfort/sensation that tends to go along with the heartburn. Unsure if he needs chest/abd CT and/or GI follow up, so we will start with GI and go from there. Unsure if he needs repeat EGD but 2019 one was normal.   - omeprazole (PRILOSEC) 20 MG DR capsule; Take 1 capsule (20 mg) by mouth 2 times daily    Atypical mole  Will take a picture of his arm and send to provider. Also has a few freckles/spots on arm he wanted looked at        Return in about 4 weeks (around 7/13/2021), or if symptoms worsen or fail to improve.  NAS Smith, NP-C  [facility]   Rafa is a 41 year old who presents for the following health issues  accompanied by his self:    [facility]     ED/UC Followup:    Facility:  [facility]  Date of visit: 5/31/2021  Reason for visit: chest pain  Current Status: stable     Got information from ER visit: Was in the ER on 4/19 with chest pan workup, negative. Cardiac MRI in 2019 was normal.  Recent chest pain ER visit he said it worsens with expanding his chest or picking up something heavy, but improves with belching or leaning EKG/labs/chest x-ray all normal. Ordered pepcid 20 mg BID x 15  days, robaxin QID prn pain    Still having arm/shoulder/chest pain. GI issues. He just got a new dog also, has been busy. He was worried about the pepcid affecting the metabolizing of the other drugs. He has not taken it yet. He felt he was going into withdrawls from not getting his ativan. He feels he has some gas attacks, so possible breakthrough heartburn from taking omeprazole. Feels the sensations he has is different from normal burning sensation. Feels it is more tissue feeling, more visceral and deep feeling that comes with gas and some slight vomiting into his throat. Feels like a stone is lodged in there.     He feels something got pulled in his sternum. And something, pain radiates from sternum to his arm. It is like creaking and popping. If feels it is on the outside in the muscle fibers. Both deep feeling and more topical feeling seems to occur at the same times.     The robaxin was quite helpful for the chest pains, it doesn't make it go away completely but helps. Taking it once a day in the morning.  Feels it addresses a problem he has had for a while. It is hard to relax right now as his dog is an old english and is 20 lbs and is only 11 weeks old. They get to be about 60 lbs.     Had a lump growing on his arm but it looks better now. Arm is call cut up from dog. Growing very slowly. No itching. He might take a picture and send to provider.     Review of Systems   Constitutional, HEENT, cardiovascular, pulmonary, gi and gu systems are negative, except as otherwise noted.      Objective           Vitals:  No vitals were obtained today due to virtual visit.    Physical Exam   GENERAL: Healthy, alert and no distress  EYES: Eyes grossly normal to inspection.  No discharge or erythema, or obvious scleral/conjunctival abnormalities.  RESP: No audible wheeze, cough, or visible cyanosis.  No visible retractions or increased work of breathing.    SKIN: Visible skin clear. No significant rash, abnormal  pigmentation or lesions.  NEURO: Cranial nerves grossly intact.  Mentation and speech appropriate for age.  PSYCH: Mentation appears normal, affect normal/bright, judgement and insight intact, normal speech and appearance well-groomed.    Reviewed from previous visit and results review            Video-Visit Details    Type of service:  Video Visit [place]  22 minutes long    Originating Location (pt. Location): Home    Distant Location (provider location):  [facility] [place]     Platform [facility] for [facility] Visit: [facility]

## 2021-06-22 ENCOUNTER — OFFICE VISIT (OUTPATIENT)
Dept: URGENT CARE | Facility: URGENT CARE | Age: 41
End: 2021-06-22
Payer: MEDICARE

## 2021-06-22 VITALS
OXYGEN SATURATION: 97 % | DIASTOLIC BLOOD PRESSURE: 85 MMHG | BODY MASS INDEX: 36.89 KG/M2 | WEIGHT: 279.6 LBS | SYSTOLIC BLOOD PRESSURE: 140 MMHG | TEMPERATURE: 98.3 F | HEART RATE: 94 BPM | RESPIRATION RATE: 18 BRPM

## 2021-06-22 DIAGNOSIS — L73.9 FOLLICULITIS: Primary | ICD-10-CM

## 2021-06-22 DIAGNOSIS — R42 LIGHTHEADEDNESS: ICD-10-CM

## 2021-06-22 DIAGNOSIS — R21 RASH: ICD-10-CM

## 2021-06-22 LAB
DEPRECATED S PYO AG THROAT QL EIA: NEGATIVE
SPECIMEN SOURCE: NORMAL

## 2021-06-22 PROCEDURE — 99214 OFFICE O/P EST MOD 30 MIN: CPT | Performed by: PHYSICIAN ASSISTANT

## 2021-06-22 PROCEDURE — 87651 STREP A DNA AMP PROBE: CPT | Performed by: PHYSICIAN ASSISTANT

## 2021-06-22 RX ORDER — CEPHALEXIN 500 MG/1
500 CAPSULE ORAL 3 TIMES DAILY
Qty: 21 CAPSULE | Refills: 0 | Status: SHIPPED | OUTPATIENT
Start: 2021-06-22 | End: 2021-06-29

## 2021-06-22 ASSESSMENT — PAIN SCALES - GENERAL: PAINLEVEL: NO PAIN (0)

## 2021-06-22 NOTE — PATIENT INSTRUCTIONS
Patient Education     Understanding Folliculitis  Folliculitis is when hair follicles become inflamed. Follicles are the tiny holes from which hair grows out of your skin. This skin condition can occur any place on the body where hair grows. But it s often found on the neck, face, and scalp.    How to say it  [id number]  What causes folliculitis?  An infection or irritation can cause this skin condition. Infectious folliculitis is usually caused by Staph aureus. But it may also be caused by other bacteria or fungi. The condition can also happen from a wound or irritation of the skin. Shaving is a common cause. Some cases may come from taking certain medicines, such as those that treat acne.   Symptoms of folliculitis  This skin condition tends to develop quickly. It looks like little pimples on a base of a red, inflamed hair follicles. These bumps may ooze pus. They may also be:     Itchy    Painful    Red    Swollen  Treatment for folliculitis  Treatment depends on the cause of the inflammation. In some cases, this skin condition will go away on its own in a few days. But it may return. Treatment options include:     Warm compress. Putting a warm, wet washcloth on the inflamed skin may help. Don't reuse the same washcloth, because that may spread infection.    Medicine. Many skin (topical) and oral medicines are available. Antibiotics are used for bacterial infections. Antifungal medicines are best for fungal infections.    Good hygiene. Keeping the skin clean can help. Use a clean razor when shaving. Prevent ingrown hairs after shaving. This can reduce folliculitis in the beard area. Avoid any substances that bother your skin.  When to call your healthcare provider  Call your healthcare provider right away if you have any of these:    Fever of 100.4 F (38 C) or higher, or as directed by your healthcare provider    Pain that gets worse    Symptoms that don t get better, or get worse    New  symptoms  [employer] last reviewed this educational content on 6/1/2019 2000-2021 [employer]. All rights reserved. This information is not intended as a substitute for professional medical care. Always follow your healthcare professional's instructions.

## 2021-06-23 LAB
SPECIMEN SOURCE: NORMAL
STREP GROUP A PCR: NOT DETECTED

## 2021-06-23 NOTE — PROGRESS NOTES
Chief Complaint   Patient presents with     Derm Problem     Rash all across his chest and shoulders started about 2 days ago- itching around the edge of the rash     Dizziness     Has been feeling lightheaded like he is floating- has been ongoing a week- comes and goes- bending over and standing up is triggering it, and being in the sunlight. Patient mentioned having a doseage change to omeprazole- and added robaxin- about 3 weeks ago unsure if it is related.             Differential Diagnosis:  Rash: Atopic dermatitis  Cellulitis  Chicken pox  Contact dermatitis  Drug eruption  Folliculitis  Hives  Pityriasis rosea  Viral exanthem          ASSESSMENT:     ICD-10-CM    1. Folliculitis  L73.9 cephALEXin (KEFLEX) 500 MG capsule     Group A Streptococcus PCR Throat Swab   2. Rash  R21 Streptococcus A Rapid Scr w Reflx to PCR     cephALEXin (KEFLEX) 500 MG capsule   3. Lightheadedness  R42          PLAN: Folliculitis secondary to new shirts with starch.  Cephalexin.  Rash and lightheadedness likely not related to each other. Lightheadedness likely drug-induced from Robaxin and and/or marijuana.  Discontinue marijuana and possibly decrease Robaxin dosage and just use at bedtime.  I have discussed clinical findings with patient.  Side effects of medications discussed.  Symptomatic care is discussed.  I have discussed the possibility of  worsening symptoms and indication to RTC or go to the ER if they occur.  All questions are answered, patient indicates understanding of these issues and is in agreement with plan.   Patient care instructions are discussed/given at the end of visit.   Lots of rest and fluids.      Jaqueline Quach PA-C      SUBJECTIVE:  41-year-old male presents for evaluation of mildly pruritic rash for 2 days on his chest and arms.  Seemed to come on after wearing new shirts with starch.  Also reports some lightheadedness over the past week .  No new topicals or laundry detergents.  Has not been hot  tubbing.  Does have a mild cough.  Recently started on Robaxin.  Also admits to new marijuana usage.  No chest pain or shortness of breath.  No headache or tinnitus.  No fever.      Allergies   Allergen Reactions     Nicotine Rash     Nicotine Patches     Pertussis Toxoid      Bad reaction as a child       Past Medical History:   Diagnosis Date     H/O esophageal ulcer      Nephrolithiasis      Panic attack 2013     Shoulder pain, left 10/19/2015     Sprain and strain of shoulder and upper arm, left, initial encounter 10/19/2015       gabapentin (NEURONTIN) 100 MG capsule, TK 1 C PO TWICE DAILY. MAY INCREASE TO 3 TIMES A DAY PRN / TOLERATED  lorazepam (ATIVAN) 2 MG tablet, Take 2 mg by mouth 3 times daily as needed Patient taking 3 mg in the morning, 2 mg in the afternoon and 2 mg in the evening.  methocarbamol (ROBAXIN) 750 MG tablet, Take 1 tablet (750 mg) by mouth 2 times daily as needed for muscle spasms  mirtazapine (REMERON) 15 MG tablet, 15 mg Patient taking 1.5 tablets at bedtime  omeprazole (PRILOSEC) 20 MG DR capsule, Take 1 capsule (20 mg) by mouth 2 times daily  order for DME, autoCPAP 8-12cm    No current facility-administered medications on file prior to visit.       Social History     Tobacco Use     Smoking status: Former Smoker     Quit date: 2013     Years since quittin.4     Smokeless tobacco: Never Used     Tobacco comment: 5 cigs per day   Substance Use Topics     Alcohol use: Yes     Alcohol/week: 0.0 standard drinks     Comment: rare use       ROS:  CONSTITUTIONAL: Negative for fatigue or fever.  EYES: Negative for eye problems.  ENT: Negative for sore throat   RESP: As above.  CV: Negative for chest pains.  GI: Negative for vomiting.  MUSCULOSKELETAL:  Negative for significant muscle or joint pains.  NEUROLOGIC: Negative for headaches.  SKIN: As above   PSYCH: Normal mentation for age.    OBJECTIVE:  BP (!) 140/85   Pulse 94   Temp 98.3  F (36.8  C) (Tympanic)   Resp  18   Wt 126.8 kg (279 lb 9.6 oz)   SpO2 97%   BMI 36.89 kg/m    GENERAL APPEARANCE: Healthy, alert and no distress.  EYES:Conjunctiva/sclera clear.  Pupils equal reactive to light and accommodation.  EOMs intact.  EARS: No cerumen.   Ear canals w/o erythema.  TM's intact w/o erythema.    THROAT: No erythema w/o tonsillar enlargement . No exudates.  NECK: Supple, nontender, no lymphadenopathy.  RESP: Lungs clear to auscultation - no rales, rhonchi or wheezes  CV: Regular rate and rhythm, normal S1 S2, no murmur noted.  NEURO: Awake, alert    SKIN: Pinpoint pink papules covering trunk and arms extensively.  Some with pustules.    Negative Ovid-Hallpike maneuver.  Results for orders placed or performed in visit on 06/22/21   Streptococcus A Rapid Scr w Reflx to PCR     Status: None    Specimen: Throat   Result Value Ref Range    Strep Specimen Description Throat     Streptococcus Group A Rapid Screen Negative NEG^Negative       Jaqueline Quach PA-C

## 2021-06-28 ENCOUNTER — NURSE TRIAGE (OUTPATIENT)
Dept: NURSING | Facility: CLINIC | Age: 41
End: 2021-06-28

## 2021-06-28 NOTE — TELEPHONE ENCOUNTER
Patient calls with multiple concerns. Patient was seen in  last week and diagnosed with folliculitis. Patient is currently on Keflex, has 2 more days of treatment. Patient reports that the rash is improved, still itchy, but it is not as raised. Patient also reports that he started coughing this week along with having congestion and a scratchy throat. Patient denies any fever. He reports feeling very tired today. Patient is coughing up phlegm that is yellow/brown in color. Denies chest pain or difficulty breathing.     Patient is advised on evaluation in the next 24 hours regarding upper respiratory symptoms per protocol. For rash patient is advised to finish antibiotics, follow up if rash does not continue to improve or if worsens. Patient verbalizes understanding and denies further questions at this time.    Alisa Silva RN  [facility]    Reason for Disposition    [1] COVID-19 infection suspected by caller or triager AND [2] mild symptoms (cough, fever, or others) AND [3] no complications or SOB    Additional Information    Negative: SEVERE difficulty breathing (e.g., struggling for each breath, speaks in single words)    Negative: Difficult to awaken or acting confused (e.g., disoriented, slurred speech)    Negative: Bluish (or gray) lips or face now    Negative: Shock suspected (e.g., cold/pale/clammy skin, too weak to stand, low BP, rapid pulse)    Negative: Sounds like a life-threatening emergency to the triager    Negative: [1] COVID-19 exposure AND [2] has not completed COVID-19 vaccine series AND [3] no symptoms    Negative: [1] COVID-19 exposure AND [2] completed COVID-19 vaccine series (fully vaccinated) AND [3] no symptoms    Negative: COVID-19 vaccine reaction suspected (e.g., fever, headache, muscle aches) occurring during days 1-3 after getting vaccine    Negative: COVID-19 vaccine, questions about    Negative: [1] COVID-19 vaccine series completed (fully vaccinated) in past  3 months AND [2] new-onset of COVID-19 symptoms BUT [3] no known exposure    Negative: [1] Had lab test confirmed COVID-19 infection within last 3 monthsAND [2] new-onset of COVID-19 symptoms BUT [3] no known exposure    Negative: [1] Lives with someone known to have influenza (flu test positive) AND [2] flu-like symptoms (e.g., cough, runny nose, sore throat, SOB; with or without fever)    Negative: [1] Adult with possible COVID-19 symptoms AND [2] triager concerned about severity of symptoms or other causes    Negative: COVID-19 and breastfeeding, questions about    Negative: SEVERE or constant chest pain or pressure (Exception: mild central chest pain, present only when coughing)    Negative: MODERATE difficulty breathing (e.g., speaks in phrases, SOB even at rest, pulse 100-120)    Negative: [1] Headache AND [2] stiff neck (can't touch chin to chest)    Negative: MILD difficulty breathing (e.g., minimal/no SOB at rest, SOB with walking, pulse <100)    Negative: Chest pain or pressure    Negative: Patient sounds very sick or weak to the triager    Negative: Fever > 103 F (39.4 C)    Negative: [1] Fever > 101 F (38.3 C) AND [2] age > 60 years    Negative: [1] Fever > 100.0 F (37.8 C) AND [2] bedridden (e.g., nursing home patient, CVA, chronic illness, recovering from surgery)    Negative: [1] HIGH RISK patient (e.g., age > 64 years, diabetes, heart or lung disease, weak immune system) AND [2] new or worsening symptoms    Negative: [1] HIGH RISK patient AND [2] influenza is widespread in the community AND [3] ONE OR MORE respiratory symptoms: cough, sore throat, runny or stuffy nose    Negative: [1] HIGH RISK patient AND [2] influenza exposure within the last 7 days AND [3] ONE OR MORE respiratory symptoms: cough, sore throat, runny or stuffy nose    Negative: Fever present > 3 days (72 hours)    Negative: [1] Fever returns after gone for over 24 hours AND [2] symptoms worse or not improved    Negative: [1]  Continuous (nonstop) coughing interferes with work or school AND [2] no improvement using cough treatment per protocol    Protocols used: CORONAVIRUS (COVID-19) DIAGNOSED OR [id number]-AI 3.25  COVID 19 Nurse Triage Plan/Patient Instructions    Please be aware that novel coronavirus (COVID-19) may be circulating in the community. If you develop symptoms such as fever, cough, or SOB or if you have concerns about the presence of another infection including coronavirus (COVID-19), please contact your health care provider or visit [email or web address].     Disposition/Instructions    In-Person Visit with provider recommended. Reference Visit Selection Guide.    Thank you for taking steps to prevent the spread of this virus.  o Limit your contact with others.  o Wear a simple mask to cover your cough.  o Wash your hands well and often.    Resources    [facility]: About COVID-19: [email or web address]    CDC: What to Do If You're Sick: www.cdc.gov/coronavirus/2019-ncov/about/steps-when-sick.html    CDC: Ending Home Isolation: www.cdc.gov/coronavirus/2019-ncov/hcp/disposition-in-home-patients.html     CDC: Caring for Someone: www.cdc.gov/coronavirus/2019-ncov/if-you-are-sick/care-for-someone.html     [facility]: Interim Guidance for Hospital Discharge to Home: www.health.[facility].[place]/diseases/coronavirus/hcp/hospdischarge.pdf    [facility] clinical trials (COVID-19 research studies): [email or web address]     Below are the COVID-19 hotlines at the [place] [facility] ([facility]). Interpreters are available.   o For health questions: Call 703-923-5381 or 1-226.770.1240 (7 a.m. to 7 p.m.)  o For questions about schools and childcare: Call 544-959-1240 or 1-442.839.5376 (7 a.m. to 7 p.m.)

## 2021-07-12 DIAGNOSIS — R13.19 ESOPHAGEAL DYSPHAGIA: ICD-10-CM

## 2021-07-29 ENCOUNTER — OFFICE VISIT (OUTPATIENT)
Dept: FAMILY MEDICINE | Facility: CLINIC | Age: 41
End: 2021-07-29
Payer: MEDICARE

## 2021-07-29 VITALS
HEART RATE: 103 BPM | OXYGEN SATURATION: 98 % | SYSTOLIC BLOOD PRESSURE: 136 MMHG | DIASTOLIC BLOOD PRESSURE: 74 MMHG | RESPIRATION RATE: 16 BRPM | BODY MASS INDEX: 35.86 KG/M2 | TEMPERATURE: 99.1 F | WEIGHT: 271.8 LBS

## 2021-07-29 DIAGNOSIS — R11.0 NAUSEA: ICD-10-CM

## 2021-07-29 DIAGNOSIS — D22.9 ATYPICAL MOLE: Primary | ICD-10-CM

## 2021-07-29 DIAGNOSIS — Z80.8 FAMILY HISTORY OF SKIN CANCER: ICD-10-CM

## 2021-07-29 DIAGNOSIS — R03.0 ELEVATED BLOOD PRESSURE READING WITHOUT DIAGNOSIS OF HYPERTENSION: ICD-10-CM

## 2021-07-29 PROCEDURE — 99214 OFFICE O/P EST MOD 30 MIN: CPT | Performed by: NURSE PRACTITIONER

## 2021-07-29 RX ORDER — METOCLOPRAMIDE 5 MG/1
5 TABLET ORAL 3 TIMES DAILY PRN
Qty: 60 TABLET | Refills: 0 | Status: SHIPPED | OUTPATIENT
Start: 2021-07-29 | End: 2022-11-21

## 2021-07-29 ASSESSMENT — PATIENT HEALTH QUESTIONNAIRE - PHQ9
SUM OF ALL RESPONSES TO PHQ QUESTIONS 1-9: 16
10. IF YOU CHECKED OFF ANY PROBLEMS, HOW DIFFICULT HAVE THESE PROBLEMS MADE IT FOR YOU TO DO YOUR WORK, TAKE CARE OF THINGS AT HOME, OR GET ALONG WITH OTHER PEOPLE: EXTREMELY DIFFICULT
SUM OF ALL RESPONSES TO PHQ QUESTIONS 1-9: 16

## 2021-07-29 ASSESSMENT — PAIN SCALES - GENERAL: PAINLEVEL: NO PAIN (0)

## 2021-07-29 NOTE — PROGRESS NOTES
Assessment & Plan     Atypical mole  Due to see dermatology.  Has family history of skin cancer, he should have routine skin check anyway.  - Adult Dermatology Referral; Future    Family history of skin cancer  As above    Nausea  Ongoing nausea, will trial Reglan to see if this helps with his bowel motility  - metoclopramide (REGLAN) 5 MG tablet; Take 1 tablet (5 mg) by mouth 3 times daily as needed (nausea)    Elevated blood pressure reading without diagnosis of hypertension  Monitor blood pressures at home  - Home Blood Pressure Monitor Order for DME - ONLY FOR DME             Depression Screening Follow Up    PHQ 7/29/2021   PHQ-9 Total Score 16   Q9: Thoughts of better off dead/self-harm past 2 weeks Several days   F/U: Thoughts of suicide or self-harm Yes   F/U: Self harm-plan No   F/U: Self-harm action No   F/U: Safety concerns No     Last PHQ-9 7/29/2021   1.  Little interest or pleasure in doing things 1   2.  Feeling down, depressed, or hopeless 3   3.  Trouble falling or staying asleep, or sleeping too much 2   4.  Feeling tired or having little energy 3   5.  Poor appetite or overeating 2   6.  Feeling bad about yourself 2   7.  Trouble concentrating 2   8.  Moving slowly or restless 0   Q9: Thoughts of better off dead/self-harm past 2 weeks 1   PHQ-9 Total Score 16   Difficulty at work, home, or with people -   In the past two weeks have you had thoughts of suicide or self harm? Yes   Do you have concerns about your personal safety or the safety of others? No   In the past 2 weeks have you thought about a plan or had intention to harm yourself? No   In the past 2 weeks have you acted on these thoughts in any way? No         No flowsheet data found.      Follow Up      Follow Up Actions Taken  Crisis resource information provided in the After Visit Summary  Referred patient back to mental health provider    Discussed the following ways the patient can remain in a safe environment:  remove alcohol,  remove drugs and be around others  Regular exercise    Return in about 4 weeks (around 8/26/2021), or if symptoms worsen or fail to improve.    Francoise An NP  [facility] CORTNEY Craig is a 41 year old who presents for the following health issues  accompanied by his self:    History of Present Illness       He eats 2-3 servings of fruits and vegetables daily.He consumes 0 sweetened beverage(s) daily.He exercises with enough effort to increase his heart rate 60 or more minutes per day.  He exercises with enough effort to increase his heart rate 7 days per week.   He is taking medications regularly.         -pt initially set up appt. for rash - rash has pretty much healed   -pt would now like to discuss a lump on L forearm that has been there since mid May. Grandfather had skin cancer.   -pt would also like to talk about a discolored spot on L shin that became 'scaly', it fell off only to grow back again.    Has old english puppy, should be about 60# or so , but father was 100lb!    Has been dizzy/tired, trouble breathing past few weeks. Outside some but not a lot.     Having lots of gas, vomiting. Taking omeprazole daily. Having lots of spasm like feelings within abdomen and a feeling of pushing up where it makes it hard to breathe. Belching helps but feels like it is acid in his throat. Hx of crohns for his mother, grandmother had gut issues. When he eats, when it gets through the stomach and small intestine it feels better. When its up higher causes discomfort.    Still getting chest pains at times    Talked with psychiatrist about marijuana getting it certified. Helping with his nightmares. Using small amount every few days.     BP was high in the past, looks good today        Review of Systems   Constitutional, HEENT, cardiovascular, pulmonary, gi and gu skin as above, systems are negative, except as otherwise noted.      Objective    There were no vitals taken for this  visit.  There is no height or weight on file to calculate BMI.  Physical Exam   GENERAL: healthy, alert and no distress  RESP: lungs clear to auscultation - no rales, rhonchi or wheezes  CV: regular rate and rhythm, normal S1 S2, no S3 or S4, no murmur, click or rub, no peripheral edema and peripheral pulses strong  MS: no gross musculoskeletal defects noted, no edema  SKIN: left forearm with small skin colored lump, right forearm with scar appearing blemish.  Left lower leg atypical mole noted    No results found for this or any previous visit (from the past 24 hour(s)).        Answers for HPI/ROS submitted by the patient on 7/29/2021  If you checked off any problems, how difficult have these problems made it for you to do your work, take care of things at home, or get along with other people?: Extremely difficult  PHQ9 TOTAL SCORE: 16

## 2021-07-30 ASSESSMENT — PATIENT HEALTH QUESTIONNAIRE - PHQ9: SUM OF ALL RESPONSES TO PHQ QUESTIONS 1-9: 16

## 2021-08-11 DIAGNOSIS — R13.19 ESOPHAGEAL DYSPHAGIA: ICD-10-CM

## 2021-08-30 ENCOUNTER — OFFICE VISIT (OUTPATIENT)
Dept: DERMATOLOGY | Facility: CLINIC | Age: 41
End: 2021-08-30
Attending: NURSE PRACTITIONER
Payer: MEDICARE

## 2021-08-30 DIAGNOSIS — L57.8 SUN-DAMAGED SKIN: Primary | ICD-10-CM

## 2021-08-30 DIAGNOSIS — D18.01 CHERRY ANGIOMA: ICD-10-CM

## 2021-08-30 DIAGNOSIS — D22.9 MULTIPLE BENIGN NEVI: ICD-10-CM

## 2021-08-30 DIAGNOSIS — L81.4 SOLAR LENTIGO: ICD-10-CM

## 2021-08-30 DIAGNOSIS — D23.9 DERMATOFIBROMA: ICD-10-CM

## 2021-08-30 DIAGNOSIS — L82.1 SEBORRHEIC KERATOSIS: ICD-10-CM

## 2021-08-30 DIAGNOSIS — L91.8 SKIN TAG: ICD-10-CM

## 2021-08-30 PROCEDURE — 99203 OFFICE O/P NEW LOW 30 MIN: CPT | Performed by: DERMATOLOGY

## 2021-08-30 NOTE — LETTER
8/30/2021         RE: Rafa Cochran  [address]  [place] [address]        Dear Colleague,    Thank you for referring your patient, Rafa Cochran, to the [facility]. Please see a copy of my visit note below.    [facility] Dermatology Note  Encounter Date: Aug 30, 2021  Office Visit     Dermatology Problem List:  None.  ____________________________________________    Assessment & Plan:    # Benign lesions: Multiple benign nevi, solar lentigos, seborrheic keratoses, cherry angiomas, dermatofibromas, skin tag. Explained to patient benign nature of lesion. No treatment is necessary at this time unless the lesion changes or becomes symptomatic.   - ABCDs of melanoma were discussed and self skin checks were advised.  - Sun precaution was advised including the use of sun screens of SPF 30 or higher, sun protective clothing, and avoidance of tanning beds.  - Recommended annual skin examinations with primary care provider at annual physical; return PRN to dermatology for any new or concerning lesions.     Procedures Performed:   None.    Follow-up: prn for new or changing lesions    Staff and Scribe:     Scribe Disclosure:   I, Anita Hinojosa, am serving as a scribe to document services personally performed by this physician, Dr. Frank Michele, based on data collection and the provider's statements to me.     Provider Disclosure:   The documentation recorded by the scribe accurately reflects the services I personally performed and the decisions made by me.    Frank Michele MD    Department of Dermatology  [facility]: Phone: [phone], Fax:[phone]  [facility]: Phone: [phone] Fax: [phone]    ____________________________________________    CC: Skin Check (Full body skin check. Spot of concern on left lower shin, right  "upper arm, right lower arm and left forearm. Patient has not previously seen dermatology. Family history of skin cancer in paternal grandfather- unknown type. )    HPI:  Mr. Rafa Cochran is a(n) 41 year old male who presents today as a new patient for skin check.    Referred by PCP for atypical nevus and family history of skin cancern on 7/29/21. Note reviewed. States \"left forearm with small skin colored lump, right forearm with scar appearing blemish.  Left lower leg atypical mole noted.\"    Today, patient notes concern of a spot on the following areas:    1. Left lower shin - Became scaly, scraped it off then it grew back.  2. Left forearm - present since May. Raised, believes it caught on something. Scabbed over, then the scab went away.  3. Right upper arm - started out as a rounded bump. Present since age 18.  4. Left wrist - same as right upper arm.  5. Groin - Loss of elasticity. Irritated when patient walks. Has tried clotrimazole for about 9 months. Has also tried ketoconazole.    The patient otherwise denies any new or concerning lesions. No bleeding, painful, pruritic, or changing lesions. They report no personal history of skin cancer. There is a family history of skin cancer (paternal grandfather), uncertain type. No history of immunosuppression. No history of indoor tanning. They do use sunscreen and protective clothing when outdoors for sun protection. Generally tans. Rest of family burns. No occupational exposure to ultraviolet light or other forms of radiation. Patient is a , currently finishing degree at [employer]. Health otherwise stable. No other skin concerns.     Labs Reviewed:  N/A    Physical Exam:  Vitals: There were no vitals taken for this visit.  SKIN: Full skin, which includes the head/face, both arms, chest, back, abdomen,both legs, genitalia and/or groin buttocks, digits and/or nails, was examined.  - Brown macules on sun exposed areas  - Brown waxy, stuck-on appearing " papules on face, trunk, and extremities.   - Brown macules and papules on trunk and extremities without concerning dermoscopy features.  - Red vascular papules on face, trunk and extremities.   - Well healed scars at prior skin cancer surgical sites without evidence of recurrence.  - There are firm tan/flesh colored papules that dimples with lateral pressure on the left lower shin, left forearm, right upper arm, left wrist, and back.  - There is a skin colored pedunculated papule on the groin area.   - No other lesions of concern on areas examined.     Medications:  Current Outpatient Medications   Medication     omeprazole (PRILOSEC) 20 MG DR capsule     gabapentin (NEURONTIN) 100 MG capsule     lorazepam (ATIVAN) 2 MG tablet     methocarbamol (ROBAXIN) 750 MG tablet     metoclopramide (REGLAN) 5 MG tablet     mirtazapine (REMERON) 15 MG tablet     order for DME     No current facility-administered medications for this visit.      Past Medical History:   Patient Active Problem List   Diagnosis     Major depression in partial remission (H)     Bipolar affective disorder (H)     OCD (obsessive compulsive disorder)     Schizoaffective disorder (H)     Sinus tachycardia     Palpitations     Prediabetes     BABATUNDE (obstructive sleep apnea)- moderate (AHI 17)     Morbid obesity (H)     Gastroesophageal reflux disease, esophagitis presence not specified     Family history of cardiac arrhythmia     H/O esophageal ulcer     Mixed hyperlipidemia     Past Medical History:   Diagnosis Date     H/O esophageal ulcer 2013     Nephrolithiasis 2009     Panic attack 7/5/2013     Shoulder pain, left 10/19/2015     Sprain and strain of shoulder and upper arm, left, initial encounter 10/19/2015        CC Francoise An NP  [address] [place] [place] [address] on close of this encounter.      Again, thank you for allowing me to participate in the care of your patient.        Sincerely,        Frank Michele MD

## 2021-08-30 NOTE — NURSING NOTE
Rafa Cochran's goals for this visit include:   Chief Complaint   Patient presents with     Skin Check     Full body skin check. Spot of concern on left lower shin, right upper arm, right lower arm and left forearm. Patient has not previously seen dermatology. Family history of skin cancer in paternal grandfather- unknown type.        He requests these members of his care team be copied on today's visit information:     PCP: Francoise An    Referring Provider:  Francoise An NP  [address] [place] [address]    There were no vitals taken for this visit.    Do you need any medication refills at today's visit? No  Clarissa Clifford CMA

## 2021-08-30 NOTE — PATIENT INSTRUCTIONS
There are many lesions on your body called a dermatofibroma. This is a benign skin finding, generally brought on by trauma.

## 2021-08-30 NOTE — PROGRESS NOTES
[facility] Dermatology Note  Encounter Date: Aug 30, 2021  Office Visit     Dermatology Problem List:  None.  ____________________________________________    Assessment & Plan:    # Benign lesions: Multiple benign nevi, solar lentigos, seborrheic keratoses, cherry angiomas, dermatofibromas, skin tag. Explained to patient benign nature of lesion. No treatment is necessary at this time unless the lesion changes or becomes symptomatic.   - ABCDs of melanoma were discussed and self skin checks were advised.  - Sun precaution was advised including the use of sun screens of SPF 30 or higher, sun protective clothing, and avoidance of tanning beds.  - Recommended annual skin examinations with primary care provider at annual physical; return PRN to dermatology for any new or concerning lesions.     Procedures Performed:   None.    Follow-up: prn for new or changing lesions    Staff and Scribe:     Scribe Disclosure:   I, Anita Hinojosa, am serving as a scribe to document services personally performed by this physician, Dr. Frank Michele, based on data collection and the provider's statements to me.     Provider Disclosure:   The documentation recorded by the scribe accurately reflects the services I personally performed and the decisions made by me.    Frank Michele MD    Department of Dermatology  [facility] [place] [facility]: Phone: [phone], Fax:[phone]  [facility]: Phone: [phone] Fax: [phone]    ____________________________________________    CC: Skin Check (Full body skin check. Spot of concern on left lower shin, right upper arm, right lower arm and left forearm. Patient has not previously seen dermatology. Family history of skin cancer in paternal grandfather- unknown type. )    HPI:  Mr. Rafa Cochran is a(n) 41 year old male who presents today as a new patient for skin  "check.    Referred by PCP for atypical nevus and family history of skin cancern on 7/29/21. Note reviewed. States \"left forearm with small skin colored lump, right forearm with scar appearing blemish.  Left lower leg atypical mole noted.\"    Today, patient notes concern of a spot on the following areas:    1. Left lower shin - Became scaly, scraped it off then it grew back.  2. Left forearm - present since May. Raised, believes it caught on something. Scabbed over, then the scab went away.  3. Right upper arm - started out as a rounded bump. Present since age 18.  4. Left wrist - same as right upper arm.  5. Groin - Loss of elasticity. Irritated when patient walks. Has tried clotrimazole for about 9 months. Has also tried ketoconazole.    The patient otherwise denies any new or concerning lesions. No bleeding, painful, pruritic, or changing lesions. They report no personal history of skin cancer. There is a family history of skin cancer (paternal grandfather), uncertain type. No history of immunosuppression. No history of indoor tanning. They do use sunscreen and protective clothing when outdoors for sun protection. Generally tans. Rest of family burns. No occupational exposure to ultraviolet light or other forms of radiation. Patient is a , currently finishing degree at [employer]. Health otherwise stable. No other skin concerns.     Labs Reviewed:  N/A    Physical Exam:  Vitals: There were no vitals taken for this visit.  SKIN: Full skin, which includes the head/face, both arms, chest, back, abdomen,both legs, genitalia and/or groin buttocks, digits and/or nails, was examined.  - Brown macules on sun exposed areas  - Brown waxy, stuck-on appearing papules on face, trunk, and extremities.   - Brown macules and papules on trunk and extremities without concerning dermoscopy features.  - Red vascular papules on face, trunk and extremities.   - Well healed scars at prior skin cancer surgical sites without " evidence of recurrence.  - There are firm tan/flesh colored papules that dimples with lateral pressure on the left lower shin, left forearm, right upper arm, left wrist, and back.  - There is a skin colored pedunculated papule on the groin area.   - No other lesions of concern on areas examined.     Medications:  Current Outpatient Medications   Medication     omeprazole (PRILOSEC) 20 MG DR capsule     gabapentin (NEURONTIN) 100 MG capsule     lorazepam (ATIVAN) 2 MG tablet     methocarbamol (ROBAXIN) 750 MG tablet     metoclopramide (REGLAN) 5 MG tablet     mirtazapine (REMERON) 15 MG tablet     order for DME     No current facility-administered medications for this visit.      Past Medical History:   Patient Active Problem List   Diagnosis     Major depression in partial remission (H)     Bipolar affective disorder (H)     OCD (obsessive compulsive disorder)     Schizoaffective disorder (H)     Sinus tachycardia     Palpitations     Prediabetes     BABATUNDE (obstructive sleep apnea)- moderate (AHI 17)     Morbid obesity (H)     Gastroesophageal reflux disease, esophagitis presence not specified     Family history of cardiac arrhythmia     H/O esophageal ulcer     Mixed hyperlipidemia     Past Medical History:   Diagnosis Date     H/O esophageal ulcer 2013     Nephrolithiasis 2009     Panic attack 7/5/2013     Shoulder pain, left 10/19/2015     Sprain and strain of shoulder and upper arm, left, initial encounter 10/19/2015        CC Francoise An NP  [address] [place] [address] on close of this encounter.

## 2021-09-14 ENCOUNTER — VIRTUAL VISIT (OUTPATIENT)
Dept: GASTROENTEROLOGY | Facility: CLINIC | Age: 41
End: 2021-09-14
Payer: MEDICARE

## 2021-09-14 DIAGNOSIS — R07.89 ATYPICAL CHEST PAIN: ICD-10-CM

## 2021-09-14 DIAGNOSIS — R13.19 ESOPHAGEAL DYSPHAGIA: Primary | ICD-10-CM

## 2021-09-14 PROCEDURE — 99214 OFFICE O/P EST MOD 30 MIN: CPT | Mod: 95 | Performed by: INTERNAL MEDICINE

## 2021-09-14 NOTE — PATIENT INSTRUCTIONS
Scheduled for pH Bravo study at [place] and then schedule for manometry study at [facility].

## 2021-09-14 NOTE — PROGRESS NOTES
Rafa is a 41 year old who is being evaluated via a billable video visit.      How would you like to obtain your AVS? MyChart  If the video visit is dropped, the invitation should be resent by: Text to cell phone: [phone]  Will anyone else be joining your video visit? No       Debora Singleton LPN on 9/14/21 at 3:08 PM

## 2021-09-14 NOTE — PROGRESS NOTES
GASTROENTEROLOGY Follow-up VIDEO VISIT    CC/REFERRING MD:    Francoise An  No ref. provider found    REASON FOR CONSULTATION:   No ref. provider found for   Chief Complaint   Patient presents with     RECHECK     Follow up       HISTORY OF PRESENT ILLNESS:    Rafa Cochran is a 41 year old male who is being evaluated via a billable video visit.      Evaluation today for atypical chest pain, reflux and dysphagia.  We previously evaluated him in 2019 for this.  At that time, we did do an upper endoscopy which looked fairly normal except for some subtle rings of the esophagus.  Biopsies of the esophagus showed 1 eosinophil per high-power field and biopsies of the stomach and small intestine were both normal.  He notes that he gets some mild discomfort in his chest which occurs on a nearly daily basis.  He will then get occasional episodes of more significant chest pain where he will have difficulty breathing.  He feels anxious when this occurs.  He will then have his fingers go numb.  He notes that this does not occur more frequently when he has stressful situations.  He does use Ativan for panic attacks but is not able to correlate whether they happen when he is using Ativan versus not using Ativan.  He does get some occasional dysphagia to solid foods.  He also gets occasional reflux.  He does take a PPI.  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    I have reviewed and updated the patient's Past Medical History, Social History, Family History and Medication List.    Exam:    General appearance:  Healthy appearing adult, in no acute distress  Eyes:  Sclera anicteric, Pupils round and reactive to light  Ears, nose, mouth and throat:  No obvious external lesions of ears and nose.  Hearing intact  Neck:  Symmetric, No obvious external lesions  Respiratory:  Normal respiration, no use of accessory muscles   MSK:  No  visual upper extremity, neck or facial muscle atrophy  ABD:  No visual abdominal distention, no audible borborygmi  Skin:  No rashes or jaundice   Psychiatric:  Oriented to person, place and time, Appropriate mood and affect.   Neurologic:  Peripheral muscle function and dexterity appear to be intact      PERTINENT STUDIES have been reviewed.    ASSESSMENT/PLAN:    Rafa Cochran is a 41 year old male who presents for follow up of reflux, dysphagia, atypical chest pain and occasional episodes of severe chest pain with difficulty breathing.  His main concern is the more severe episodes of chest pain where he notes he feels uncomfortable and he has difficulty breathing.  I suspect that these are unrelated to a GI source.  Description of tingling in the fingers along with these episodes may be suggestive of panic attacks though he feels that these are distinctly different.  He does have some more classic underlying reflux and more frequent minor chest pain episodes and so we could do some further evaluation to see if reflux could be related to this.  I think a 96-hour Bravo study to look at pH exposure and assess for symptom correlation could be useful.  We could also do esophageal manometry to evaluate for motility disorder such as esophageal spasm and this will also give some additional evaluation for his dysphagia symptoms.  If these studies are unremarkable, then I would focus more on if this could be panic attacks or else get some additional cardiac or pulmonary evaluations.      Video-Visit Details    Type of service:  Video Visit    Total Video Time: 30    Originating Location (pt. Location): Home    Distant Location (provider location):  [facility]     Mode of Communication:  Video Conference via "[employer]    A total of 45 minutes was spent with reviewing the chart, discussing with the patient, documentation and coordination of care.    Sabas Grajeda MD    RTC 4 months  to review study results

## 2021-09-15 ENCOUNTER — MYC MEDICAL ADVICE (OUTPATIENT)
Dept: CARDIOLOGY | Facility: CLINIC | Age: 41
End: 2021-09-15

## 2021-10-02 ENCOUNTER — HEALTH MAINTENANCE LETTER (OUTPATIENT)
Age: 41
End: 2021-10-02

## 2021-10-19 NOTE — TELEPHONE ENCOUNTER
Per the last visit with Dr Grajeda patient should schedule the following appointments:      EGD    Manometry    Return visit with Dr Valero's due in January 2022     Called patient and spoke to.  He declined to schedule at this time and will call back later to schedule.  Provided GI scheduling number to patient [phone].      Ame Marie  Surgical Specialties Procedure   [facility] [place]  10/19/2021 1:41 PM

## 2021-10-29 DIAGNOSIS — R07.9 CHEST PAIN, UNSPECIFIED TYPE: ICD-10-CM

## 2021-10-29 RX ORDER — METHOCARBAMOL 750 MG/1
750 TABLET, FILM COATED ORAL 2 TIMES DAILY PRN
Qty: 60 TABLET | Refills: 2 | Status: SHIPPED | OUTPATIENT
Start: 2021-10-29 | End: 2022-04-19

## 2022-01-17 ENCOUNTER — LAB (OUTPATIENT)
Dept: URGENT CARE | Facility: URGENT CARE | Age: 42
End: 2022-01-17
Attending: NURSE PRACTITIONER
Payer: MEDICARE

## 2022-01-17 ENCOUNTER — OFFICE VISIT (OUTPATIENT)
Dept: URGENT CARE | Facility: URGENT CARE | Age: 42
End: 2022-01-17
Payer: MEDICARE

## 2022-01-17 VITALS
SYSTOLIC BLOOD PRESSURE: 153 MMHG | OXYGEN SATURATION: 98 % | HEART RATE: 96 BPM | WEIGHT: 297.8 LBS | DIASTOLIC BLOOD PRESSURE: 85 MMHG | TEMPERATURE: 98 F | BODY MASS INDEX: 39.29 KG/M2

## 2022-01-17 DIAGNOSIS — H61.23 BILATERAL IMPACTED CERUMEN: ICD-10-CM

## 2022-01-17 DIAGNOSIS — J01.00 ACUTE NON-RECURRENT MAXILLARY SINUSITIS: Primary | ICD-10-CM

## 2022-01-17 DIAGNOSIS — R03.0 ELEVATED BLOOD PRESSURE READING WITHOUT DIAGNOSIS OF HYPERTENSION: ICD-10-CM

## 2022-01-17 DIAGNOSIS — R05.9 COUGH: ICD-10-CM

## 2022-01-17 DIAGNOSIS — R06.02 SOB (SHORTNESS OF BREATH): ICD-10-CM

## 2022-01-17 DIAGNOSIS — R07.0 THROAT PAIN: ICD-10-CM

## 2022-01-17 LAB
DEPRECATED S PYO AG THROAT QL EIA: NEGATIVE
FLUAV AG SPEC QL IA: NEGATIVE
FLUBV AG SPEC QL IA: NEGATIVE
GROUP A STREP BY PCR: NOT DETECTED
SARS-COV-2 RNA RESP QL NAA+PROBE: NEGATIVE

## 2022-01-17 PROCEDURE — 99214 OFFICE O/P EST MOD 30 MIN: CPT | Performed by: NURSE PRACTITIONER

## 2022-01-17 PROCEDURE — U0003 INFECTIOUS AGENT DETECTION BY NUCLEIC ACID (DNA OR RNA); SEVERE ACUTE RESPIRATORY SYNDROME CORONAVIRUS 2 (SARS-COV-2) (CORONAVIRUS DISEASE [COVID-19]), AMPLIFIED PROBE TECHNIQUE, MAKING USE OF HIGH THROUGHPUT TECHNOLOGIES AS DESCRIBED BY CMS-2020-01-R: HCPCS

## 2022-01-17 PROCEDURE — 87651 STREP A DNA AMP PROBE: CPT | Performed by: NURSE PRACTITIONER

## 2022-01-17 PROCEDURE — 87804 INFLUENZA ASSAY W/OPTIC: CPT | Performed by: NURSE PRACTITIONER

## 2022-01-17 PROCEDURE — U0005 INFEC AGEN DETEC AMPLI PROBE: HCPCS

## 2022-01-17 NOTE — PROGRESS NOTES
Assessment & Plan     Acute non-recurrent maxillary sinusitis    - amoxicillin-clavulanate (AUGMENTIN) 875-125 MG tablet  Dispense: 14 tablet; Refill: 0    Throat pain    - Streptococcus A Rapid Screen w/Reflex to PCR - Clinic Collect  - Influenza A & B Antigen - Clinic Collect  - Group A Streptococcus PCR Throat Swab    Bilateral impacted cerumen    Elevated blood pressure reading without diagnosis of hypertension       Reviewed negative rapid strep results during visit, PCR testing in process and COVID test in process, will notify if positive. Influenza testing negative. Discussed sinus infections are usually caused by viruses and usually resolve within 7-10 days. Due to duration of symptoms, prescription sent to pharmacy for Augmentin twice daily for 7 days. Recommended rest, fluids especially warm clear liquids such as tea with honey and lemon, decreasing dairy which can increase congestion, humidifier, steam, nasal irrigation with saline, flonase nasal spray. Debrox drops for impacted ear wax recommended. Self-quarantine recommended.     Patient is hypertensive today but asymptomatic.  No blurring of vision, chest pain, shortness of breath, dizziness, numbness, or weakness. Second BP reading was also above goal.  Patient instructed to follow up with PCP within the next week for BP recheck.  Instructed to go to emergency department if develops chest pain, shortness of breath, dizziness, vision changes, numbness, weakness.       Follow-up with PCP if symptoms persist for 7 days, and sooner if symptoms worsen or new symptoms develop.     Discussed red flag symptoms which warrant immediate visit in emergency room    All questions were answered and patient verbalized understanding. AVS reviewed with patient.     Loraine Crouch, PADMINI, APRN, CNP 1/17/2022 11:10 AM  [facility] TERESITARUPINDER Craig is a 41 year old male who presents to clinic today for the following health  issues:  Chief Complaint   Patient presents with     Sick     Sinus issues-Sx started 2.5 weeks ago, congestion, runny/dry nose, Pounding headache off and on. Irritated throat     Patient presents for evaluation of cough. Associated symptoms: nasal congestion, sore throat, headache, sinus pressure/pain, teeth pain. Symptoms have been present for about 2.5 weeks and have been worsening. Denies fever, chills, shortness of breath. He has been vaccinated for COVID, not influenza. No immunosuppression. He already completed a COVID test this morning which is in process.     Problem list, Medication list, Allergies, and Medical history reviewed in EPIC.    ROS:  Review of systems negative except for noted above        Objective    BP (!) 153/85   Pulse 96   Temp 98  F (36.7  C) (Oral)   Wt 135.1 kg (297 lb 12.8 oz)   SpO2 98%   BMI 39.29 kg/m    Physical Exam  Constitutional:       General: He is not in acute distress.     Appearance: He is not toxic-appearing or diaphoretic.   HENT:      Head: Normocephalic and atraumatic.      Right Ear: External ear normal. There is impacted cerumen.      Left Ear: External ear normal. There is impacted cerumen.      Nose:      Right Sinus: Maxillary sinus tenderness present. No frontal sinus tenderness.      Left Sinus: No maxillary sinus tenderness or frontal sinus tenderness.      Comments: Moderate nasal congestion     Mouth/Throat:      Mouth: Mucous membranes are moist.      Pharynx: Oropharynx is clear. No oropharyngeal exudate or posterior oropharyngeal erythema.   Eyes:      Conjunctiva/sclera: Conjunctivae normal.   Cardiovascular:      Rate and Rhythm: Normal rate and regular rhythm.      Heart sounds: Normal heart sounds.   Pulmonary:      Effort: Pulmonary effort is normal. No respiratory distress.      Breath sounds: Normal breath sounds. No wheezing, rhonchi or rales.   Lymphadenopathy:      Cervical: No cervical adenopathy.   Skin:     General: Skin is warm and dry.    Neurological:      Mental Status: He is alert.          Labs:  Results for orders placed or performed in visit on 01/17/22   Streptococcus A Rapid Screen w/Reflex to PCR - Clinic Collect     Status: Normal    Specimen: Throat; Swab   Result Value Ref Range    Group A Strep antigen Negative Negative

## 2022-01-17 NOTE — PATIENT INSTRUCTIONS
"  Patient Education   After Your COVID-19 (Coronavirus) Test  You have been tested for COVID-19 (coronavirus).   If you'll have surgery in the next few days, we'll let you know ahead of time if you have the virus. Please call your surgeon's office with any questions.  For all other patients: Results are usually available in [employer] within 2 to 3 days.   If you do not have a [employer] account, you'll get a letter in the mail in about 7 to 10 days.   [employer] is often the fastest way to get test results. Please sign up if you do not already have a [employer] account. See the handout Getting COVID-19 Test Results in [employer] for help.  What if my test result is positive?  If your test is positive and you have not viewed your result in [employer], you'll get a phone call with your result. (A positive test means that you have the virus.)     Follow the tips under \"How do I self-isolate?\" below for 10 days (20 days if you have a weak immune system).    You don't need to be retested for COVID-19 before going back to school or work. As long as you're fever-free and feeling better, you can go back to school, work and other activities after waiting the 10 or 20 days.  What if I have questions after I get my results?  If you have questions about your results, please visit our testing website at [email or web address].   After 7 to 10 days, if you have not gotten your results:     Call [phone] ([phone]) and ask to speak with our COVID-19 results team.    If you're being treated at an infusion center: Call your infusion center directly.  What are the symptoms of COVID-19?  Cough, fever and trouble breathing are the most common signs of COVID-19.  Other symptoms can include new headaches, new muscle or body aches, new and unexplained fatigue (feeling very tired), chills, sore throat, congestion (stuffy or runny nose), diarrhea (loose poop), loss of taste or smell, belly pain, and nausea or vomiting " "(feeling sick to your stomach or throwing up).  You may already have symptoms of COVID-19, or they may show up later.  What should I do if I have symptoms?  If you're having surgery: Call your surgeon's office.  For all other patients: Stay home and away from others (self-isolate) until ...    You've had no fever--and no medicine that reduces fever--for 1 full day (24 hours), AND    Other symptoms have gotten better. For example, your cough or breathing has improved, AND    At least 10 days have passed since your symptoms first started.  How do I self-isolate?    Stay in your own room, even for meals. Use your own bathroom if you can.    Stay away from others in your home. No hugging, kissing or shaking hands. No visitors.    Don't go to work, school or anywhere else.    Clean \"high touch\" surfaces often (doorknobs, counters, handles). Use household cleaning spray or wipes. You'll find a full list of  on the EPA website: www.epa.gov/pesticide-registration/list-n-disinfectants-use-against-sars-cov-2.    Cover your mouth and nose with a mask or other face covering to avoid spreading germs.    Wash your hands and face often. Use soap and water.    Caregivers in these groups are at risk for severe illness due to COVID-19:  ? People 65 years and older  ? People who live in a nursing home or long-term care facility  ? People with chronic disease (lung, heart, cancer, diabetes, kidney, liver, immunologic)  ? People who have a weakened immune system, including those who:    Are in cancer treatment    Take medicine that weakens the immune system, such as corticosteroids    Had a bone marrow or organ transplant    Have an immune deficiency    Have poorly controlled HIV or AIDS    Are obese (body mass index of 40 or higher)    Smoke regularly    Caregivers should wear gloves while washing dishes, handling laundry and cleaning bedrooms and bathrooms.    Use caution when washing and drying laundry: Don't shake dirty " laundry and use the warmest water setting that you can.    For more tips on managing your health at home, go to www.cdc.gov/coronavirus/2019-ncov/downloads/10Things.pdf.  How can I take care of myself at home?  1. Get lots of rest. Drink extra fluids (unless a doctor has told you not to).  2. Take Tylenol (acetaminophen) for fever or pain. If you have liver or kidney problems, ask your family doctor if it's OK to take Tylenol.   Adults can take either:  ? 650 mg (two 325 mg pills) every 4 to 6 hours, or   ? 1,000 mg (two 500 mg pills) every 8 hours as needed.  ? Note: Don't take more than 3,000 mg in one day. Acetaminophen is found in many medicines (both prescribed and over-the-counter medicines). Read all labels to be sure you don't take too much.   For children, check the Tylenol bottle for the right dose. The dose is based on the child's age or weight.  3. If you have other health problems (like cancer, heart failure, an organ transplant or severe kidney disease): Call your specialty clinic if you don't feel better in the next 2 days.  4. Know when to call 911. Emergency warning signs include:  ? Trouble breathing or shortness of breath  ? Chest pain or pressure that doesn't go away  ? Feeling confused like you haven't felt before, or not being able to wake up  ? Bluish-colored lips or face  5. If your doctor prescribed a blood thinner medicine: Follow their instructions.  Where can I get more information?    [facility] - About COVID-19:   [email or web address]    CDC - If You're Sick: cdc.gov/coronavirus/2019-ncov/about/steps-when-sick.html    CDC - Ending Home Isolation: www.cdc.gov/coronavirus/2019-ncov/hcp/disposition-in-home-patients.html    CDC - Caring for Someone: www.cdc.gov/coronavirus/2019-ncov/if-you-are-sick/care-for-someone.html    [facility] - Interim Guidance for Hospital Discharge to Home: www.health.[facility].[place]/diseases/coronavirus/hcp/hospdischarge.pdf    [facility]  clinical trials (COVID-19 research studies): clinicalaffairs.[facility]-clinical-trials    Below are the COVID-19 hotlines at the [place] Department of Health ([facility]). Interpreters are available.  ? For health questions: Call 056-619-9377 or 1-226.887.7179 (7 a.m. to 7 p.m.)  ? For questions about schools and childcare: Call 399-718-3943 or 1-831.335.1986 (7 a.m. to 7 p.m.)    For informational purposes only. Not to replace the advice of your health care provider. Clinically reviewed by Infection Prevention and the [facility] COVID-19 Clinical Team. Copyright   2020 [employer]. All rights reserved. [employer] 313936 - Rev 11/11/20.       Patient Education     Sinusitis (Antibiotic Treatment)    The sinuses are air-filled spaces within the bones of the face. They connect to the inside of the nose. Sinusitis is an inflammation of the tissue that lines the sinuses. Sinusitis can occur during a cold. It can also happen due to allergies to pollens and other particles in the air. Sinusitis can cause symptoms of sinus congestion and a feeling of fullness. A sinus infection causes fever, headache, and facial pain. There is often green or yellow fluid draining from the nose or into the back of the throat (post-nasal drip). You have been given antibiotics to treat this condition.   Home care    Take the full course of antibiotics as instructed. Don't stop taking them, even when you feel better.    Drink plenty of water, hot tea, and other liquids as directed by the healthcare provider. This may help thin nasal mucus. It also may help your sinuses drain fluids.    Heat may help soothe painful areas of your face. Use a towel soaked in hot water. Or,  the shower and direct the warm spray onto your face. Using a vaporizer along with a menthol rub at night may also help soothe symptoms.     An expectorant with guaifenesin may help thin nasal mucus and help your sinuses drain fluids. Talk with your  provider or pharmacists before taking an over-the-counter (OTC) medicine if you have any questions about it or its side effects..    You can use an OTC decongestant, unless a similar medicine was prescribed to you. Nasal sprays work the fastest. Use one that contains phenylephrine or oxymetazoline. First blow your nose gently. Then use the spray. Don't use these medicines more often than directed on the label. If you do, your symptoms may get worse. You may also take pills that contain pseudoephedrine. Don t use products that combine multiple medicines. This is because side effects may be increased. Read labels. You can also ask the pharmacist for help. (People with high blood pressure should not use decongestants. They can raise blood pressure.) Talk with your provider or pharmacist if you have any questions about the medicine..    OTC antihistamines may help if allergies contributed to your sinusitis. Talk with your provider or pharmacist if you have any questions about the medicine..    Don't use nasal rinses or irrigation during an acute sinus infection, unless your healthcare provider tells you to. Rinsing may spread the infection to other areas in your sinuses.    Use acetaminophen or ibuprofen to control pain, unless another pain medicine was prescribed to you. If you have chronic liver or kidney disease or ever had a stomach ulcer, talk with your healthcare provider before using these medicines. Never give aspirin to anyone under age 18 who is ill with a fever. It may cause severe liver damage.    Don't smoke. This can make symptoms worse.    Follow-up care  Follow up with your healthcare provider, or as advised.   When to seek medical advice  Call your healthcare provider if any of these occur:     Facial pain or headache that gets worse    Stiff neck    Unusual drowsiness or confusion    Swelling of your forehead or eyelids    Symptoms don't go away in 10 days    Vision problems, such as blurred or double  vision    Fever of 100.4 F (38 C) or higher, or as directed by your healthcare provider  Call 911  Call 911 if any of these occur:     Seizure    Trouble breathing    Feeling dizzy or faint    Fingernails, skin or lips look blue, purple , or gray  Prevention  Here are steps you can take to help prevent an infection:     Keep good hand washing habits.    Don t have close contact with people who have sore throats, colds, or other upper respiratory infections.    Don t smoke, and stay away from secondhand smoke.    Stay up to date with of your vaccines.  [employer] last reviewed this educational content on 12/1/2019 2000-2021 [employer]. All rights reserved. This information is not intended as a substitute for professional medical care. Always follow your healthcare professional's instructions.

## 2022-01-22 ENCOUNTER — OFFICE VISIT (OUTPATIENT)
Dept: URGENT CARE | Facility: URGENT CARE | Age: 42
End: 2022-01-22
Payer: MEDICARE

## 2022-01-22 ENCOUNTER — NURSE TRIAGE (OUTPATIENT)
Dept: NURSING | Facility: CLINIC | Age: 42
End: 2022-01-22
Payer: MEDICARE

## 2022-01-22 VITALS
BODY MASS INDEX: 38.52 KG/M2 | TEMPERATURE: 97.6 F | WEIGHT: 292 LBS | DIASTOLIC BLOOD PRESSURE: 82 MMHG | OXYGEN SATURATION: 98 % | SYSTOLIC BLOOD PRESSURE: 142 MMHG | RESPIRATION RATE: 16 BRPM | HEART RATE: 97 BPM

## 2022-01-22 DIAGNOSIS — R10.9 RIGHT FLANK PAIN: Primary | ICD-10-CM

## 2022-01-22 DIAGNOSIS — R10.31 RLQ ABDOMINAL PAIN: ICD-10-CM

## 2022-01-22 DIAGNOSIS — J01.00 ACUTE NON-RECURRENT MAXILLARY SINUSITIS: ICD-10-CM

## 2022-01-22 DIAGNOSIS — R03.0 ELEVATED BLOOD PRESSURE READING WITHOUT DIAGNOSIS OF HYPERTENSION: ICD-10-CM

## 2022-01-22 DIAGNOSIS — R35.0 URINARY FREQUENCY: ICD-10-CM

## 2022-01-22 DIAGNOSIS — R30.0 DYSURIA: ICD-10-CM

## 2022-01-22 DIAGNOSIS — G47.33 OSA (OBSTRUCTIVE SLEEP APNEA): ICD-10-CM

## 2022-01-22 PROCEDURE — 99215 OFFICE O/P EST HI 40 MIN: CPT | Performed by: EMERGENCY MEDICINE

## 2022-01-22 ASSESSMENT — ENCOUNTER SYMPTOMS
BLOOD IN STOOL: 0
FREQUENCY: 1
FLANK PAIN: 1
SHORTNESS OF BREATH: 0
NAUSEA: 1
DYSURIA: 1
CONSTIPATION: 0
ARTHRALGIAS: 0
DIARRHEA: 0
HEMATURIA: 0
COUGH: 0
DIFFICULTY URINATING: 0
FEVER: 0
SEIZURES: 0
ABDOMINAL PAIN: 1
CHILLS: 0
SORE THROAT: 0
PALPITATIONS: 0
VOMITING: 0
COLOR CHANGE: 0

## 2022-01-22 NOTE — TELEPHONE ENCOUNTER
Woke up 30 min ago with extreme pain in back on the right side   -a bit lower than rib going down to almost the hip     Got up and it intensified for 5-10 min   -rated 8/10    Since then the extreme pain has subsided, but it is still very sore     Also having abdominal pain on that side    Nauseated for a while after that as well     Feeling fatigued now    Now pain is rated 2/10    Felt similar to when had a kidney stone quite a while ago     Is on amoxicillin for a sinus infection  -negative covid    No fever   No vomiting  No leg weakness  No blood  In urine  No burning with urination     Triaged to a disposition of See a physician within 24 hours. Patient is agreeable and will go to .     COVID 19 Nurse Triage Plan/Patient Instructions    Please be aware that novel coronavirus (COVID-19) may be circulating in the community. If you develop symptoms such as fever, cough, or SOB or if you have concerns about the presence of another infection including coronavirus (COVID-19), please contact your health care provider or visit [email or web address].     Disposition/Instructions    In-Person Visit with provider recommended. Reference Visit Selection Guide.    Thank you for taking steps to prevent the spread of this virus.  o Limit your contact with others.  o Wear a simple mask to cover your cough.  o Wash your hands well and often.    Resources    [facility]: About COVID-19: [email or web address]    CDC: What to Do If You're Sick: www.cdc.gov/coronavirus/2019-ncov/about/steps-when-sick.html    CDC: Ending Home Isolation: www.cdc.gov/coronavirus/2019-ncov/hcp/disposition-in-home-patients.html     CDC: Caring for Someone: www.cdc.gov/coronavirus/2019-ncov/if-you-are-sick/care-for-someone.html     [facility]: Interim Guidance for Hospital Discharge to Home: www.health.[facility].mn.[place]/diseases/coronavirus/hcp/hospdischarge.pdf    [facility] clinical trials (COVID-19 research studies):  clinicalaffairs.[facility]-clinical-trials     Below are the COVID-19 hotlines at the [place] Department of Health ([facility]). Interpreters are available.   o For health questions: Call 598-037-0150 or 1-815.153.5166 (7 a.m. to 7 p.m.)  o For questions about schools and childcare: Call 195-852-7045 or 1-168.219.8524 (7 a.m. to 7 p.m.)      Reason for Disposition    MODERATE pain (e.g., interferes with normal activities or awakens from sleep)    Additional Information    Negative: Passed out (i.e., lost consciousness, collapsed and was not responding)    Negative: Shock suspected (e.g., cold/pale/clammy skin, too weak to stand, low BP, rapid pulse)    Negative: Difficult to awaken or acting confused (e.g., disoriented, slurred speech)    Negative: Sounds like a life-threatening emergency to the triager    Negative: Followed a major injury to the back (e.g., MVA, fall > 10 feet or 3 meters, penetrating injury, etc.)    Negative: Back pain or flank pain during pregnancy    Negative: Upper, mid or lower back pain that occurs mainly in the midline    Negative: [1] SEVERE pain (e.g., excruciating, scale 8-10) AND [2] present > 1 hour    Negative: [1] SEVERE pain (e.g., excruciating, scale 8-10) AND [2] not improved after pain medicine    Negative: [1] Sudden onset of severe flank pain AND [2] age > 60    Negative: [1] Abdominal pain AND [2] age > 60    Negative: [1] Unable to urinate (or only a few drops) > 4 hours AND     [2] bladder feels very full (e.g., palpable bladder or strong urge to urinate)    Negative: Vomiting    Negative: Weakness of a leg or foot (e.g., unable to bear weight, dragging foot)    Negative: Patient sounds very sick or weak to the triager    Negative: Fever > 100.4 F (38.0 C)    Negative: Pain or burning with passing urine (urination)    Protocols used: FLANK PAIN-A-    Norma Mueller RN on 1/22/2022 at 4:45 AM

## 2022-01-22 NOTE — PATIENT INSTRUCTIONS
Patient Education     Kidney Stone (Urine)  Does this test have other names?  Urine stone risk profile  What is this test?  This test checks your urine for chemicals that might cause your body to form kidney stones. The test also looks for blood in your urine, which can be a symptom of kidney stones.   Kidney stones are hard masses of minerals and salts that can form in your kidneys. They can be as small as a grain of sand or more than an inch in diameter. Usually theses stones or crystals pass through your body when you urinate. But sometimes they can get stuck in your urinary tract and cause pain.   Why do I need this test?  You may need this test if your healthcare provider suspects that you have kidney stones. Symptoms of kidney stones include:     Pain in your lower belly, side, or lower back (flank)    Nausea and vomiting    Sudden, strong urge to urinate    Pain when urinating    Blood in your urine  You may also have this test if you had a kidney stone or you are being treated for kidney stones. If you have had a kidney stone or any treatments for a kidney stone, you should wait 1 to 2 months, or until you have completely recovered, before having this test.   You will need to repeat the test at least twice so your healthcare provider can compare the results.   What other tests might I have along with this test?  Your healthcare provider may also order imaging tests. These include an ultrasound, CT scan and, a special type of X-ray (pyelogram) that uses a dye to look for kidney stones.   Your provider is also likely to order blood tests, to look for calcium, phosphate, uric acid, oxalate, and citrate. These are some of the chemicals that are most likely to cause your body to form kidney stones.   What do my test results mean?  Test results may vary depending on your age, gender, health history, the method used for the test, and other things. Your test results may not mean you have a problem. Ask your  healthcare provider what your test results mean for you.    The results will show whether your urine has high or low levels of the chemicals that are most likely to cause stones to form. These chemicals are calcium, phosphate, uric acid, oxalate, and citrate.   If your levels are not normal, it may mean that you have a kidney stone or stones.  Abnormal levels may also mean that you have another kidney disorder, such as a urinary tract infection.   How is this test done?  This test is done with a 24-hour urine sample. For this sample, you must collect all of your urine for 24 hours. Empty your bladder completely first in the morning without collecting it. Note the time. Then collect your urine every time you go to the bathroom over the next 24 hours.   Does this test pose any risks?  This test does not pose any known risks.  What might affect my test results?  Having this test too soon after treatment for a previous kidney stone can affect your results. You should wait several months after treatment before having this test.   How do I get ready for this test?  You don't need to prepare for this test. Be sure your healthcare provider knows about all medicines, herbs, vitamins, and supplements you are taking. This includes medicines that don't need a prescription and any illegal drugs you may use.    [employer] last reviewed this educational content on 8/1/2020 2000-2021 [employer]. All rights reserved. This information is not intended as a substitute for professional medical care. Always follow your healthcare professional's instructions.

## 2022-01-22 NOTE — PROGRESS NOTES
"Chief Complaint   Patient presents with     Urgent Care     Back Pain     Per pt started today around 530 this morning, right low back does radiate to the front,stabbing and ache pain, episode lasted for 10 minutes concern it maybe kidney stone as their is a history          OBJECTIVE:    HPI:    Rafa Cochran is an 41 year old male with a history of obstructive ureterolithiasis presenting to the ED for evaluation of \"severe\" right flank pain in the middle of the night ~3AM that woke him from sleep.  Notes that the pain subsided after 20 minutes for the most part but is now having some lingering pain and right lower quadrant abdominal pain.  He also notes that he has been experiencing some dysuria and pressure sensations in his bladder as well as frequency of urination for the past day.  He notes no hematuria, changes in bowel habits, chest pain, shortness of breath, cough, fever, sore throat, diffuse body aches other concerns.  Patient does note that he has a history of obstructive ureterolithiasis.  Moreover, patient also notes that he has been taking Augmentin for a sinus infection and this cortisone was completed.  He also history of kidney disease, abdominal surgery or other concerns.     ROS:     Review of Systems   Constitutional: Negative for chills and fever.   HENT: Negative for ear pain and sore throat.    Eyes: Negative for visual disturbance.   Respiratory: Negative for cough and shortness of breath.    Cardiovascular: Negative for chest pain and palpitations.   Gastrointestinal: Positive for abdominal pain and nausea. Negative for blood in stool, constipation, diarrhea and vomiting.   Genitourinary: Positive for dysuria, flank pain (Right) and frequency. Negative for difficulty urinating, hematuria, scrotal swelling and testicular pain.   Musculoskeletal: Negative for arthralgias.   Skin: Negative for color change and rash.   Neurological: Negative for seizures and syncope.   All other systems reviewed " and are negative.         Family History   Family History   Problem Relation Age of Onset     C.A.D. Maternal Grandfather      Diabetes Maternal Grandfather      Hypertension Maternal Grandfather      Heart Disease Maternal Grandfather      C.A.D. Paternal Grandmother      Diabetes Paternal Grandmother      Circulatory Paternal Grandmother      Gastrointestinal Disease Paternal Grandmother      Hypertension Paternal Grandfather      Circulatory Paternal Grandfather      Heart Disease Paternal Grandfather      Gastrointestinal Disease Paternal Grandfather      Heart Failure Paternal Grandfather      Hypertension Father      Alcohol/Drug Father      Allergies Father      Depression Father      Gastrointestinal Disease Father      Prostate Cancer Father      Esophageal Cancer Father      Cerebrovascular Disease Maternal Grandmother      Arthritis Maternal Grandmother      Heart Disease Maternal Grandmother      Alcohol/Drug Mother      Allergies Mother      Circulatory Mother      Depression Mother      Heart Disease Mother      Diabetes Mother      Alcohol/Drug Sister      Allergies Sister      Depression Sister      Genitourinary Problems Sister         Problem history  Patient Active Problem List   Diagnosis     Major depression in partial remission (H)     Bipolar affective disorder (H)     OCD (obsessive compulsive disorder)     Schizoaffective disorder (H)     Sinus tachycardia     Palpitations     Prediabetes     BABATUNDE (obstructive sleep apnea)- moderate (AHI 17)     Morbid obesity (H)     Gastroesophageal reflux disease, esophagitis presence not specified     Family history of cardiac arrhythmia     H/O esophageal ulcer     Mixed hyperlipidemia        Allergies  Allergies   Allergen Reactions     Nicotine Rash     Nicotine Patches     Pertussis Toxoid      Bad reaction as a child        Social History  Social History     Socioeconomic History     Marital status: Single     Spouse name: Not on file     Number of  children: Not on file     Years of education: Not on file     Highest education level: Not on file   Occupational History     Not on file   Tobacco Use     Smoking status: Former Smoker     Quit date: 2013     Years since quittin.0     Smokeless tobacco: Never Used     Tobacco comment: 5 cigs per day   Substance and Sexual Activity     Alcohol use: Yes     Alcohol/week: 0.0 standard drinks     Comment: rare use     Drug use: No     Sexual activity: Not Currently     Partners: Female     Birth control/protection: None   Other Topics Concern     Parent/sibling w/ CABG, MI or angioplasty before 65F 55M? Not Asked   Social History Narrative     Not on file     Social Determinants of Health     Financial Resource Strain: Not on file   Food Insecurity: Not on file   Transportation Needs: Not on file   Physical Activity: Not on file   Stress: Not on file   Social Connections: Not on file   Intimate Partner Violence: Not on file   Housing Stability: Not on file        Current Meds    Current Outpatient Medications:      amoxicillin-clavulanate (AUGMENTIN) 875-125 MG tablet, Take 1 tablet by mouth 2 times daily for 7 days, Disp: 14 tablet, Rfl: 0     gabapentin (NEURONTIN) 100 MG capsule, Take 300 mg by mouth 3 times daily And an additional 200 mg prn, Disp: , Rfl:      lorazepam (ATIVAN) 2 MG tablet, Take 2 mg by mouth 3 times daily as needed Patient taking 3 mg in the morning, 2 mg in the afternoon and 2 mg in the evening., Disp: 30 tablet, Rfl: 0     methocarbamol (ROBAXIN) 750 MG tablet, Take 1 tablet (750 mg) by mouth 2 times daily as needed for muscle spasms, Disp: 60 tablet, Rfl: 2     mirtazapine (REMERON) 15 MG tablet, 15 mg Patient taking 1.5 tablets at bedtime, Disp: , Rfl: 2     omeprazole (PRILOSEC) 20 MG DR capsule, Take 1 capsule (20 mg) by mouth 2 times daily (Patient taking differently: Take 20 mg by mouth daily ), Disp: 120 capsule, Rfl: 1     [facility] [place], Disp:  , Rfl:       metoclopramide (REGLAN) 5 MG tablet, Take 1 tablet (5 mg) by mouth 3 times daily as needed (nausea) (Patient not taking: Reported on 8/30/2021), Disp: 60 tablet, Rfl: 0        OBJECTIVE     Vital signs reviewed by Abe Lockwood PA-C    Patient Vitals for the past 24 hrs:   BP Temp Temp src Pulse Resp SpO2 Weight   01/22/22 0942 (!) 142/82 -- -- -- -- -- --   01/22/22 0923 (!) 161/79 97.6  F (36.4  C) Tympanic 97 16 98 % 132.5 kg (292 lb)         Physical Exam  Constitutional:       General: He is not in acute distress.     Appearance: He is well-developed.   HENT:      Right Ear: Tympanic membrane and external ear normal.      Left Ear: Tympanic membrane and external ear normal.      Nose: Nose normal.      Mouth/Throat:      Mouth: No oral lesions.      Pharynx: No oropharyngeal exudate.   Eyes:      General:         Right eye: No discharge.         Left eye: No discharge.      Conjunctiva/sclera: Conjunctivae normal.      Pupils: Pupils are equal, round, and reactive to light.   Neck:      Thyroid: No thyromegaly.      Trachea: No tracheal deviation.   Cardiovascular:      Rate and Rhythm: Normal rate and regular rhythm.      Pulses: Normal pulses.      Heart sounds: Normal heart sounds, S1 normal and S2 normal. No murmur heard.  No S3 or S4 sounds.    Pulmonary:      Effort: Pulmonary effort is normal. No respiratory distress.      Breath sounds: Normal breath sounds. No wheezing or rales.   Abdominal:      General: Bowel sounds are normal.      Palpations: Abdomen is soft. There is no mass.      Tenderness: There is abdominal tenderness. There is right CVA tenderness. There is no left CVA tenderness, guarding or rebound.   Musculoskeletal:         General: No deformity. Normal range of motion.      Cervical back: Neck supple.   Lymphadenopathy:      Cervical: No cervical adenopathy.   Skin:     General: Skin is warm and dry.      Findings: No lesion or rash.   Neurological:      Mental Status: He is alert and  oriented to person, place, and time.      Motor: No abnormal muscle tone.      Deep Tendon Reflexes: Reflexes are normal and symmetric.   Psychiatric:         Speech: Speech normal.         Thought Content: Thought content normal.         Judgment: Judgment normal.        Physical Exam   Constitutional: The patient is oriented to person, place, and time. Alert and cooperative.   HENT:   Eyes: Conjunctivae, EOM and lids are normal. Pupils are equal, round, and reactive to light.   Mouth: Mucous membranes are moist. Posterior oropharynx is clear.  Cardiovascular: Regular rate and rhythm  GI: RLQ abdominal tenderness.  No rebound or guarding.  Right CVA tenderness.  No left CVA tenderness.   Pulmonary/Chest: Effort normal. No respiratory distress. Lungs are clear to auscultation throughout.   Musculoskeletal: Normal range of motion.   Neurological: Normal strength. Moves all extremities spontaneously. Alert and oriented x3.  Skin: No rash noted.   Psychiatric:The patient has a normal mood and affect.       Urgent Care Course:  9:21 AM: Patient evaluated and seen for physical examination. Discussed plan of care.   9:42 AM: Discussed plan for discharge to go to the ED now for further work up. Patient verbalized understanding and agreement with the plan and was discharged in stable condition.      Labs:    No results found for any visits on 01/22/22.    Imaging:  None      ASSESSMENT AND PLAN      Medical Decision Making:  Rafa Cochran is a 41 year old male who presents with right sided flank pain.  Associated symptoms include nausea, dysuria, abdomial pain and frequency of urination.   A broad differential diagnosis was considered including diverticulitis, ureterolithiasis, tumor, colitis, cholecystitis, aneurysm, dissection, hydronephrosis, pneumonia, rib fracture, UTI, pyelonephritis amongst many other etiologies.  Patient clinical exam concerning for ureterolithiasis.  Patient has a history of obstructive  ureterolithiasis. Pain is controlled here in the Urgent Care. There is no fever here.  Patient also notes that he is taking Augmentin currently for a sinus infection has nearly completed this course.    Unfortunately, we are unable to perform a CT non-contrast study to assess for kidney stone or other intra-abdominal process causing his right lower quadrant pain and intermittent right flank pain.  Patient is afebrile hemodynamically stable here.  And to go to the ER now for UA, blood work, CT and other work as determined by the ED physician for likely ureterolithiasis.  Patient is discharged in stable condition.       Diagnosis:    (R10.9) Right flank pain  (primary encounter diagnosis)    (R30.0) Dysuria    (R35.0) Urinary frequency    (G47.33) BABATUNDE (obstructive sleep apnea)    (J01.00) Acute non-recurrent maxillary sinusitis    (R03.0) Elevated blood pressure reading without diagnosis of hypertension          Disposition: Discharged - Discussed reporting to ED Now        Abe Lockwood PA-C  January 22, 2022

## 2022-02-03 ENCOUNTER — NURSE TRIAGE (OUTPATIENT)
Dept: NURSING | Facility: CLINIC | Age: 42
End: 2022-02-03

## 2022-02-03 ENCOUNTER — TELEPHONE (OUTPATIENT)
Dept: FAMILY MEDICINE | Facility: CLINIC | Age: 42
End: 2022-02-03

## 2022-02-03 NOTE — TELEPHONE ENCOUNTER
RN triage   Call from pt   Pt states he has cough and SOB --- SOB at rest and sometimes increased hear trate   Sometimes dizzy   Has not checked temperature   Per protocol = should go to ED   Pt will go to ED     Stephie Nowak RN  [facility]  Triage Nurse Advisor    COVID 19 Nurse Triage Plan/Patient Instructions    Please be aware that novel coronavirus (COVID-19) may be circulating in the community. If you develop symptoms such as fever, cough, or SOB or if you have concerns about the presence of another infection including coronavirus (COVID-19), please contact your health care provider or visit [email or web address].     Disposition/Instructions    ED Visit recommended. Follow protocol based instructions.     Bring Your Own Device:  Please also bring your smart device(s) (smart phones, tablets, laptops) and their charging cables for your personal use and to communicate with your care team during your visit.    Thank you for taking steps to prevent the spread of this virus.  o Limit your contact with others.  o Wear a simple mask to cover your cough.  o Wash your hands well and often.    Resources    [facility]: About COVID-19: [email or web address]    CDC: What to Do If You're Sick: www.cdc.gov/coronavirus/2019-ncov/about/steps-when-sick.html    CDC: Ending Home Isolation: www.cdc.gov/coronavirus/2019-ncov/hcp/disposition-in-home-patients.html     CDC: Caring for Someone: www.cdc.gov/coronavirus/2019-ncov/if-you-are-sick/care-for-someone.html     [facility]: Interim Guidance for Hospital Discharge to Home: www.health.[facility].[place]/diseases/coronavirus/hcp/hospdischarge.pdf    [facility] clinical trials (COVID-19 research studies): [email or web address]     Below are the COVID-19 hotlines at the [place] Department of Health ([facility]). Interpreters are available.   o For health questions: Call 109-039-5170 or 1-160.872.8698 (7 a.m. to 7 p.m.)  o For questions about schools  and childcare: Call 543-185-7564 or 1-740.535.4340 (7 a.m. to 7 p.m.)                     Reason for Disposition    MODERATE difficulty breathing (e.g., speaks in phrases, SOB even at rest, pulse 100-120)    Additional Information    Negative: SEVERE difficulty breathing (e.g., struggling for each breath, speaks in single words)    Negative: Difficult to awaken or acting confused (e.g., disoriented, slurred speech)    Negative: Bluish (or gray) lips or face now    Negative: Shock suspected (e.g., cold/pale/clammy skin, too weak to stand, low BP, rapid pulse)    Negative: Sounds like a life-threatening emergency to the triager    Negative: SEVERE or constant chest pain or pressure (Exception: mild central chest pain, present only when coughing)    Protocols used: CORONAVIRUS (COVID-19) DIAGNOSED OR [id number] 8.25.2021

## 2022-02-03 NOTE — TELEPHONE ENCOUNTER
Left a message to return a call to [phone].  Carlita Galindo R.N.    RN:  Whomever speaks with Rafa please triage his symptoms as requested by Dr. Gela Wood to see if he is appropriate for a clinic visit with his symptoms or should he go to the E.R. If further support is needed please take down what is needed and route the message back to the provider  Thank you. Carlita Galindo R.N.    Next 5 appointments (look out 90 days)    Feb 03, 2022  3:30 PM  (Arrive by 3:10 PM)  Provider Visit with Gela Wood MD  [facility] ([facility] ) [address] [facility] [address] [phone]

## 2022-02-03 NOTE — TELEPHONE ENCOUNTER
Patient scheduled with Dr. Wood for this afternoon for symptoms of Back pain, difficulty waking and going upstairs, and shortness of breath.   Per Dr. Wood please call patient to discuss symptoms.    Possible concerns for PE so we need to determine if patient appropriate for clinic or if he should proceed directly to ROZINA Brown CMA

## 2022-02-04 NOTE — TELEPHONE ENCOUNTER
Patient was seen at [facility] on 2/3/2022 for his symptoms.  Thank you. Carlita Galindo R.N.

## 2022-02-17 PROBLEM — K21.9 GASTROESOPHAGEAL REFLUX DISEASE: Chronic | Status: ACTIVE | Noted: 2018-01-08

## 2022-04-08 ENCOUNTER — TELEPHONE (OUTPATIENT)
Dept: FAMILY MEDICINE | Facility: CLINIC | Age: 42
End: 2022-04-08
Payer: MEDICARE

## 2022-04-08 DIAGNOSIS — R07.9 CHEST PAIN, UNSPECIFIED TYPE: ICD-10-CM

## 2022-04-09 RX ORDER — METHOCARBAMOL 750 MG/1
750 TABLET, FILM COATED ORAL 2 TIMES DAILY PRN
Qty: 60 TABLET | Refills: 2 | OUTPATIENT
Start: 2022-04-09

## 2022-04-19 ENCOUNTER — VIRTUAL VISIT (OUTPATIENT)
Dept: FAMILY MEDICINE | Facility: CLINIC | Age: 42
End: 2022-04-19
Payer: MEDICARE

## 2022-04-19 DIAGNOSIS — R13.19 ESOPHAGEAL DYSPHAGIA: ICD-10-CM

## 2022-04-19 DIAGNOSIS — F41.9 ANXIETY: ICD-10-CM

## 2022-04-19 DIAGNOSIS — R03.0 ELEVATED BLOOD PRESSURE READING WITHOUT DIAGNOSIS OF HYPERTENSION: Primary | ICD-10-CM

## 2022-04-19 DIAGNOSIS — K21.9 GASTROESOPHAGEAL REFLUX DISEASE, UNSPECIFIED WHETHER ESOPHAGITIS PRESENT: ICD-10-CM

## 2022-04-19 DIAGNOSIS — M62.838 MUSCLE SPASM: ICD-10-CM

## 2022-04-19 DIAGNOSIS — J32.1 CHRONIC FRONTAL SINUSITIS: ICD-10-CM

## 2022-04-19 DIAGNOSIS — F42.9 OBSESSIVE-COMPULSIVE DISORDER, UNSPECIFIED TYPE: ICD-10-CM

## 2022-04-19 PROCEDURE — 99214 OFFICE O/P EST MOD 30 MIN: CPT | Mod: 95 | Performed by: NURSE PRACTITIONER

## 2022-04-19 RX ORDER — LORAZEPAM 2 MG/1
2 TABLET ORAL 3 TIMES DAILY PRN
Qty: 30 TABLET | Refills: 0 | COMMUNITY
Start: 2022-04-19

## 2022-04-19 RX ORDER — METHOCARBAMOL 750 MG/1
750 TABLET, FILM COATED ORAL 2 TIMES DAILY PRN
Qty: 60 TABLET | Refills: 2 | Status: SHIPPED | OUTPATIENT
Start: 2022-04-19 | End: 2022-04-19

## 2022-04-19 RX ORDER — MIRTAZAPINE 15 MG/1
TABLET, FILM COATED ORAL
Refills: 2 | COMMUNITY
Start: 2022-04-19

## 2022-04-19 RX ORDER — GABAPENTIN 100 MG/1
300 CAPSULE ORAL 3 TIMES DAILY
COMMUNITY
Start: 2022-04-19 | End: 2023-05-30

## 2022-04-19 RX ORDER — METHOCARBAMOL 750 MG/1
750 TABLET, FILM COATED ORAL 3 TIMES DAILY PRN
Qty: 90 TABLET | Refills: 2 | Status: SHIPPED | OUTPATIENT
Start: 2022-04-19 | End: 2022-07-22

## 2022-04-19 RX ORDER — DOXYCYCLINE 100 MG/1
100 CAPSULE ORAL 2 TIMES DAILY
Qty: 14 CAPSULE | Refills: 0 | Status: SHIPPED | OUTPATIENT
Start: 2022-04-19 | End: 2022-04-26

## 2022-04-19 NOTE — PROGRESS NOTES
Rafa is a 42 year old who is being evaluated via a billable video visit.      How would you like to obtain your AVS? MyChart  If the video visit is dropped, the invitation should be resent by: Text to cell phone: [phone]  Will anyone else be joining your video visit? No      Video Start Time 2:21pm    Assessment & Plan     Muscle spasms  Having more muscle spasms. It is somewhat helpful, will increase to TID prn  - methocarbamol (ROBAXIN) 750 MG tablet; Take 1 tablet (750 mg) by mouth 3 times daily as needed for muscle spasms    Elevated blood pressure reading without diagnosis of hypertension  Check BP aat   - Home Blood Pressure Monitor Order for DME - ONLY FOR DME    Esophageal dysphagia  Stable. Due for EGD soon with GI  - omeprazole (PRILOSEC) 20 MG DR capsule; Take 1 capsule (20 mg) by mouth daily    Gastroesophageal reflux disease, unspecified whether esophagitis present  As abpve    Anxiety  Stable. Medications Rx from psychiatry   - LORazepam (ATIVAN) 2 MG tablet; Take 1 tablet (2 mg) by mouth 3 times daily as needed for anxiety Patient taking 3 mg in the morning, 2 mg in the afternoon and 2 mg in the evening.  - gabapentin (NEURONTIN) 100 MG capsule; Take 3 capsules (300 mg) by mouth 3 times daily And an additional 200 mg prn  - mirtazapine (REMERON) 15 MG tablet; 15 mg Patient taking 1.5 tablets at bedtime    Obsessive-compulsive disorder, unspecified type  stable  - LORazepam (ATIVAN) 2 MG tablet; Take 1 tablet (2 mg) by mouth 3 times daily as needed for anxiety Patient taking 3 mg in the morning, 2 mg in the afternoon and 2 mg in the evening.  - gabapentin (NEURONTIN) 100 MG capsule; Take 3 capsules (300 mg) by mouth 3 times daily And an additional 200 mg prn  - mirtazapine (REMERON) 15 MG tablet; 15 mg Patient taking 1.5 tablets at bedtime    Chronic frontal sinusitis   has tried augmentin and amoxicillin in the past, last augmentin in January 2022, wasn't very helpful. Trial doxycycline-call if  "not better in a week  - doxycycline hyclate (VIBRAMYCIN) 100 MG capsule; Take 1 capsule (100 mg) by mouth 2 times daily for 7 days       BMI:   Estimated body mass index is 38.52 kg/m  as calculated from the following:    Height as of 4/26/21: 1.854 m (6' 1\").    Weight as of 1/22/22: 132.5 kg (292 lb).   Weight management plan: Discussed healthy diet and exercise guidelines    See Patient Instructions    Return in about 4 months (around 8/19/2022) for Medication check, Phone or Video visit okay.    NAS Smith, NP-C  [facility] CORTNEY Craig is a 42 year old who presents for the following health issues  accompanied by his self.    HPI     Medication Followup of robaxin:     Taking Medication as prescribed: yes- pt ran out of medication a couple weeks ago so is having more spasms at the moment    Side Effects:  None    Medication Helping Symptoms:  yes     rx by psychiatrist  Gabapentin, ativan, mirtazapine    His GI setup EGD to check for esophageal spams      Having trouble sleeping due to sinus symptoms, mostly right sided. Had antibiotic in the past, isn't sure it was helpful at that time. The cpap is not working sometimes due to nasal symptoms.  Also has ruptured tooth on right side, only way to fix that is pulling it which would cause problems chewing.  Had Augmentin in January 2022 and that wasn't super helpful.    Around that time had trouble getting up stairs. He has been exercising and that is improving. Feeling really wiped out. Coughing lots of stuff from lungs. That has been consistent. Unsure if sinuses are draining or not. COVID test was negative in January. No fever/chills.         Review of Systems   Constitutional, HEENT-as above, cardiovascular, pulmonary, gi and gu systems are negative, except as otherwise noted.      Objective           Vitals:  No vitals were obtained today due to virtual visit.    Physical Exam   GENERAL: Healthy, alert and no " distress  EYES: Eyes grossly normal to inspection.  No discharge or erythema, or obvious scleral/conjunctival abnormalities.  RESP: No audible wheeze, cough, or visible cyanosis.  No visible retractions or increased work of breathing.    SKIN: Visible skin clear. No significant rash, abnormal pigmentation or lesions.  NEURO: Cranial nerves grossly intact.  Mentation and speech appropriate for age.  PSYCH: Mentation appears normal, affect normal/bright, judgement and insight intact, normal speech and appearance well-groomed.    Office Visit on 01/17/2022   Component Date Value Ref Range Status     Group A Strep antigen 01/17/2022 Negative  Negative Final     Influenza A antigen 01/17/2022 Negative  Negative Final     Influenza B antigen 01/17/2022 Negative  Negative Final     Group A strep by PCR 01/17/2022 Not Detected  Not Detected Final               Video-Visit Details    Type of service:  Video Visit    Video End Time:2:38 PM    Originating Location (pt. Location): Home    Distant Location (provider location):  Home secure location    Platform used for Video Visit: Alok

## 2022-04-19 NOTE — PATIENT INSTRUCTIONS
Let provider Monday/Tuesday if NOT improving in sinus/cough symptoms     Okay to increase robaxin to three times a day as needed      Work on healthy nutrition and exercise

## 2022-05-02 DIAGNOSIS — R13.19 ESOPHAGEAL DYSPHAGIA: ICD-10-CM

## 2022-05-08 ENCOUNTER — HEALTH MAINTENANCE LETTER (OUTPATIENT)
Age: 42
End: 2022-05-08

## 2022-05-09 ENCOUNTER — NURSE TRIAGE (OUTPATIENT)
Dept: FAMILY MEDICINE | Facility: CLINIC | Age: 42
End: 2022-05-09
Payer: MEDICARE

## 2022-05-09 ENCOUNTER — TELEPHONE (OUTPATIENT)
Dept: FAMILY MEDICINE | Facility: CLINIC | Age: 42
End: 2022-05-09
Payer: MEDICARE

## 2022-05-09 NOTE — TELEPHONE ENCOUNTER
"Patient was prescribed doxycycline 4/19/22 for sinus infection.  He did not start it.  He states started the prescription last night.  This morning noted that the palm side of his fingers between where they attach to finger to just past next knuckle are peeling.  More so on right hand but present also on left.  No redness or swelling.  No blistering.  States don't itch but has a \"very light burning sensation\"  Is able to use his hands/fingers as normal.  Did apply hand lotion but doesn't feel it's dry skin.  He feels it's a side effect of the doxycycline and wanting message to his provider for advisement.  The hand lotion did make hands feel better but states can see the peeling skin still.  Has not worsened during day.  Please advise.  Please note:  He is taking the doxycycline now because he has a slight headache in forehead and tenderness under both eyes.  No jaw pain.  He states his nose can go from being very dry to running.  No cough.  No fever.  States does have some nasal congestion.  Didn't start the prescription in April because wasn't sure needed it.  Beth Reyna, RN      "

## 2022-05-09 NOTE — TELEPHONE ENCOUNTER
"    Reason for Disposition    Dizziness, lightheadedness, or weakness    Answer Assessment - Initial Assessment Questions  1. DESCRIPTION: \"Please describe your heart rate or heart beat that you are having\" (e.g., fast/slow, regular/irregular, skipped or extra beats, \"palpitations\")      Patient states he woke up x2 during night with heart racing.  States \"was pounding really fast\"  Doesn't feel was skipping beats but was \"really pounding.  Doesn't know what heart rate was then.  He reports heart rates in 120's at one point.  I asked him how he checks his heart rate; he states has a FITBIT.  Reports that when he is sitting/resting his heart rate is 100.  If he gets up and walks at all will go up to 120's with normal activity.  He states that he often goes to bed late.  Last night went to bed at 4-5am and woke up at 12 or 2pm.  He states he watches TV all night.    2. ONSET: \"When did it start?\" (Minutes, hours or days)       Early hours of a.m.  3. DURATION: \"How long does it last\" (e.g., seconds, minutes, hours)      States \"It comes and goes\"  Unable to tell me how long it lasts but present often.  4. PATTERN \"Does it come and go, or has it been constant since it started?\"  \"Does it get worse with exertion?\"   \"Are you feeling it now?\"      Worse with walking.  5. TAP: \"Using your hand, can you tap out what you are feeling on a chair or table in front of you, so that I can hear?\" (Note: not all patients can do this)          6. HEART RATE: \"Can you tell me your heart rate?\" \"How many beats in 15 seconds?\"  (Note: not all patients can do this)        FITBIT records 100's to 120's when he's checked it.  This is at rest or with minimal walking  7. RECURRENT SYMPTOM: \"Have you ever had this before?\" If so, ask: \"When was the last time?\" and \"What happened that time?\"       States he has a while back.    8. CAUSE: \"What do you think is causing the palpitations?\"      He thinks it's his anxiety.  States history of  9. " "CARDIAC HISTORY: \"Do you have any history of heart disease?\" (e.g., heart attack, angina, bypass surgery, angioplasty, arrhythmia)         10. OTHER SYMPTOMS: \"Do you have any other symptoms?\" (e.g., dizziness, chest pain, sweating, difficulty breathing)        Patient reports that he feels weaker than normal.  States has moments of lightheadedness.  Has chest discomfort center of his chest that goes up into front of his throat.  He reports this symptom comes and goes all day.  No radiation to left or right.  Does not go up into neck or shoulder/arm/back.  He is supposed to have an EGD.  Hasn't scheduled yet.  11. PREGNANCY: \"Is there any chance you are pregnant?\" \"When was your last menstrual period?\"        n/a    Protocols used: HEART RATE AND HEARTBEAT [id number]      "

## 2022-05-11 ENCOUNTER — ANCILLARY PROCEDURE (OUTPATIENT)
Dept: GENERAL RADIOLOGY | Facility: CLINIC | Age: 42
End: 2022-05-11
Attending: NURSE PRACTITIONER
Payer: MEDICARE

## 2022-05-11 ENCOUNTER — OFFICE VISIT (OUTPATIENT)
Dept: FAMILY MEDICINE | Facility: CLINIC | Age: 42
End: 2022-05-11
Payer: MEDICARE

## 2022-05-11 VITALS
RESPIRATION RATE: 18 BRPM | HEIGHT: 73 IN | TEMPERATURE: 98.5 F | HEART RATE: 77 BPM | BODY MASS INDEX: 39.67 KG/M2 | WEIGHT: 299.3 LBS | SYSTOLIC BLOOD PRESSURE: 116 MMHG | OXYGEN SATURATION: 95 % | DIASTOLIC BLOOD PRESSURE: 76 MMHG

## 2022-05-11 DIAGNOSIS — M25.551 HIP PAIN, RIGHT: ICD-10-CM

## 2022-05-11 DIAGNOSIS — R36.9 URETHRAL DISCHARGE: ICD-10-CM

## 2022-05-11 DIAGNOSIS — S61.219A CUT OF FINGER: ICD-10-CM

## 2022-05-11 DIAGNOSIS — N50.811 PAIN IN RIGHT TESTICLE: Primary | ICD-10-CM

## 2022-05-11 LAB
ALBUMIN UR-MCNC: NEGATIVE MG/DL
APPEARANCE UR: CLEAR
BILIRUB UR QL STRIP: NEGATIVE
COLOR UR AUTO: YELLOW
ERYTHROCYTE [DISTWIDTH] IN BLOOD BY AUTOMATED COUNT: 12.9 % (ref 10–15)
GLUCOSE UR STRIP-MCNC: NEGATIVE MG/DL
HCT VFR BLD AUTO: 39.3 % (ref 40–53)
HGB BLD-MCNC: 13.4 G/DL (ref 13.3–17.7)
HGB UR QL STRIP: NEGATIVE
KETONES UR STRIP-MCNC: NEGATIVE MG/DL
LEUKOCYTE ESTERASE UR QL STRIP: NEGATIVE
MCH RBC QN AUTO: 29.3 PG (ref 26.5–33)
MCHC RBC AUTO-ENTMCNC: 34.1 G/DL (ref 31.5–36.5)
MCV RBC AUTO: 86 FL (ref 78–100)
NITRATE UR QL: NEGATIVE
PH UR STRIP: 6 [PH] (ref 5–7)
PLATELET # BLD AUTO: 256 10E3/UL (ref 150–450)
RBC # BLD AUTO: 4.58 10E6/UL (ref 4.4–5.9)
RBC #/AREA URNS AUTO: NORMAL /HPF
SP GR UR STRIP: 1.01 (ref 1–1.03)
UROBILINOGEN UR STRIP-ACNC: 0.2 E.U./DL
WBC # BLD AUTO: 7.1 10E3/UL (ref 4–11)
WBC #/AREA URNS AUTO: NORMAL /HPF

## 2022-05-11 PROCEDURE — 85027 COMPLETE CBC AUTOMATED: CPT | Performed by: NURSE PRACTITIONER

## 2022-05-11 PROCEDURE — 73502 X-RAY EXAM HIP UNI 2-3 VIEWS: CPT | Mod: TC | Performed by: FAMILY MEDICINE

## 2022-05-11 PROCEDURE — 99214 OFFICE O/P EST MOD 30 MIN: CPT | Mod: 25 | Performed by: NURSE PRACTITIONER

## 2022-05-11 PROCEDURE — 90714 TD VACC NO PRESV 7 YRS+ IM: CPT | Performed by: NURSE PRACTITIONER

## 2022-05-11 PROCEDURE — 81001 URINALYSIS AUTO W/SCOPE: CPT | Performed by: NURSE PRACTITIONER

## 2022-05-11 PROCEDURE — 90471 IMMUNIZATION ADMIN: CPT | Performed by: NURSE PRACTITIONER

## 2022-05-11 PROCEDURE — 36415 COLL VENOUS BLD VENIPUNCTURE: CPT | Performed by: NURSE PRACTITIONER

## 2022-05-11 ASSESSMENT — PAIN SCALES - GENERAL: PAINLEVEL: NO PAIN (1)

## 2022-05-11 NOTE — PROGRESS NOTES
Assessment & Plan     Pain in right testicle  Patient feels is either coming from his hip or stemming from the testicle. UA is okay, labs pending, ultrasound is pending  - UA with Microscopic reflex to Culture - lab collect; Future  - CBC with platelets; Future  - US Testicular & Scrotum w Doppler Ltd; Future  - UA with Microscopic reflex to Culture - lab collect  - CBC with platelets  - Urine Microscopic    Hip pain, right  X-ray is normal, follow up with PT, if not improving with PT may consider other imaging   - XR Hip Right 2-3 Views  - Physical Therapy Referral; Future    Urethral discharge  UA is normal  - UA with Microscopic reflex to Culture - lab collect; Future  - UA with Microscopic reflex to Culture - lab collect  - Urine Microscopic    Cut of finger  TD given, cut finger when cleaning out grill on something sharp        Return in about 2 weeks (around 5/25/2022), or if symptoms worsen or fail to improve.    NAS Smith, NP-C  [facility] C[facility]NEY Craig is a 42 year old who presents for the following health issues  accompanied by his self.        History of Present Illness       Reason for visit:  Hip / testicular pain  Symptom onset:  3-4 weeks ago  Symptoms include:  Sporadic episodes of intense pain.  Symptom intensity:  Severe  Symptom progression:  Staying the same  Had these symptoms before:  No  What makes it worse:  Walking on treadmill/sitting on soft surfaces/waking up in the morning  What makes it better:  No    He eats 2-3 servings of fruits and vegetables daily.He consumes 0 sweetened beverage(s) daily.He exercises with enough effort to increase his heart rate 60 or more minutes per day.  He exercises with enough effort to increase his heart rate 7 days per week.   He is taking medications regularly.       Concern - right hip, radiating down leg, also to testicle   Onset: a while   Description: radiating   Intensity: severe  Progression of Symptoms:   "worsening    Therapies tried and outcome: none, therapies havent lasted long enough for relief     Feels like sharp pain \"like an IV or electric spasm\" into right leg/testicle. Hx of large dog/puppy jumping on him also a few times. Also tunnel vision at times when this occurs. Also weakness in right leg when he has the pain. Some milky discharge from penis at times. Had been using vacum pump but trying to be careful with that.     See [employer] message also from 5/4/22.     Walking on treadmill it has occurred, sitting on soft surfaces can make it worse. Driving in the car causes  Pain at times.   No fever/chills      Feels sinus symptoms are gone.       Review of Systems   Constitutional, HEENT, cardiovascular, pulmonary, MS- as above. ystems are negative, except as otherwise noted.      Objective    /76 (BP Location: Right arm, Patient Position: Sitting, Cuff Size: Adult Large)   Pulse 77   Temp 98.5  F (36.9  C) (Oral)   Resp 18   Ht 1.85 m (6' 0.84\")   Wt 135.8 kg (299 lb 4.8 oz)   SpO2 95%   BMI 39.67 kg/m    Body mass index is 39.67 kg/m .  Physical Exam   GENERAL: healthy, alert and no distress  RESP: lungs clear to auscultation - no rales, rhonchi or wheezes  CV: regular rate and rhythm, normal S1 S2, no S3 or S4, no murmur, click or rub   (male): normal male genitalia without lesions or urethral discharge, no hernia. Slight tenderness in right testicle  MS: no gross musculoskeletal defects noted, slight discomfort into right hip with external hip rotation    Results for orders placed or performed in visit on 05/11/22 (from the past 24 hour(s))   XR Hip Right 2-3 Views    Narrative    HIP RIGHT TWO - THREE VIEWS   5/11/2022 1:35 PM     HISTORY: Hip pain for few months. Hip pain, right.    COMPARISON: None.      Impression    IMPRESSION: Hip joint space appears preserved. No acute fracture or  AVN.    DREW HAMLIN MD         SYSTEM ID:  [id number]   CBC with platelets   Result Value Ref Range    " WBC Count 7.1 4.0 - 11.0 10e3/uL    RBC Count 4.58 4.40 - 5.90 10e6/uL    Hemoglobin 13.4 13.3 - 17.7 g/dL    Hematocrit 39.3 (L) 40.0 - 53.0 %    MCV 86 78 - 100 fL    MCH 29.3 26.5 - 33.0 pg    MCHC 34.1 31.5 - 36.5 g/dL    RDW 12.9 10.0 - 15.0 %    Platelet Count 256 150 - 450 10e3/uL   UA with Microscopic reflex to Culture - lab collect    Specimen: Urine, Clean Catch   Result Value Ref Range    Color Urine Yellow Colorless, Straw, Light Yellow, Yellow    Appearance Urine Clear Clear    Glucose Urine Negative Negative mg/dL    Bilirubin Urine Negative Negative    Ketones Urine Negative Negative mg/dL    Specific Gravity Urine 1.010 1.003 - 1.035    Blood Urine Negative Negative    pH Urine 6.0 5.0 - 7.0    Protein Albumin Urine Negative Negative mg/dL    Urobilinogen Urine 0.2 0.2, 1.0 E.U./dL    Nitrite Urine Negative Negative    Leukocyte Esterase Urine Negative Negative   Urine Microscopic   Result Value Ref Range    RBC Urine 0-2 0-2 /HPF /HPF    WBC Urine 0-5 0-5 /HPF /HPF    Narrative    Urine Culture not indicated

## 2022-05-11 NOTE — NURSING NOTE
Prior to immunization administration, verified patients identity using patient s name and date of birth. Please see Immunization Activity for additional information.     Screening Questionnaire for Adult Immunization    Are you sick today?   No   Do you have allergies to medications, food, a vaccine component or latex?   No   Have you ever had a serious reaction after receiving a vaccination?   No   Do you have a long-term health problem with heart, lung, kidney, or metabolic disease (e.g., diabetes), asthma, a blood disorder, no spleen, complement component deficiency, a cochlear implant, or a spinal fluid leak?  Are you on long-term aspirin therapy?   No   Do you have cancer, leukemia, HIV/AIDS, or any other immune system problem?   No   Do you have a parent, brother, or sister with an immune system problem?   No   In the past 3 months, have you taken medications that affect  your immune system, such as prednisone, other steroids, or anticancer drugs; drugs for the treatment of rheumatoid arthritis, Crohn s disease, or psoriasis; or have you had radiation treatments?   No   Have you had a seizure, or a brain or other nervous system problem?   No   During the past year, have you received a transfusion of blood or blood    products, or been given immune (gamma) globulin or antiviral drug?   No   For women: Are you pregnant or is there a chance you could become       pregnant during the next month?   No   Have you received any vaccinations in the past 4 weeks?   No     Immunization questionnaire answers were all negative.        Per orders of Francoise An, injection of TD given by Cherrie Douglas CMA. Patient instructed to remain in clinic for 15 minutes afterwards, and to report any adverse reaction to me immediately.       Screening performed by Cherrie Douglas CMA on 5/11/2022 at 1:47 PM.

## 2022-05-11 NOTE — RESULT ENCOUNTER NOTE
Alvarado Craig,   Your labs and urine look normal. Let's see what the ultrasound and x-ray show.  Thank you,  NAS Smith, NP-C  [facility]

## 2022-05-12 NOTE — RESULT ENCOUNTER NOTE
Alvarado Craig,   The x-ray of your hip is normal. Follow up with PT and we will consider further imaging if PT is not helping.  Thank you,  NAS Smith, NP-C  [facility]

## 2022-05-17 ENCOUNTER — ANCILLARY PROCEDURE (OUTPATIENT)
Dept: ULTRASOUND IMAGING | Facility: CLINIC | Age: 42
End: 2022-05-17
Attending: NURSE PRACTITIONER
Payer: MEDICARE

## 2022-05-17 DIAGNOSIS — N50.811 PAIN IN RIGHT TESTICLE: ICD-10-CM

## 2022-05-17 DIAGNOSIS — N50.811 PAIN IN RIGHT TESTICLE: Primary | ICD-10-CM

## 2022-05-17 PROCEDURE — 76870 US EXAM SCROTUM: CPT

## 2022-05-17 PROCEDURE — 93976 VASCULAR STUDY: CPT

## 2022-05-17 NOTE — RESULT ENCOUNTER NOTE
Alvarado Craig,   Your testicular ultrasound showed an area of possible concern in the left testicle but was not deemed to be typical for cancer. We should repeat the ultrasound in 3 months to ensure this shadow area is gone. If it is not, I will refer to a urologist. The right testicle appeared normal.     Are you still having the pain?   Thank you,  NAS Smith, NP-C  [facility]

## 2022-07-21 ENCOUNTER — NURSE TRIAGE (OUTPATIENT)
Dept: NURSING | Facility: CLINIC | Age: 42
End: 2022-07-21

## 2022-07-21 DIAGNOSIS — M62.838 MUSCLE SPASM: ICD-10-CM

## 2022-07-21 NOTE — TELEPHONE ENCOUNTER
Routing refill request to provider for review/approval patient is out of this medication.      Last Written Prescription Date:  4/19/2022  Last Fill Quantity: 90,  # refills: 2   Last office visit provider:  5/11/2022      Patient said the script stated take 3 tablets a day as needed.  The pharmacy told the patient to take only 2 due to the amount of pills given.  Can patient get the needed amount so the patient can take 3 tablets daily if needed?    Also patient is out of this medication.  He is requesting this to be sent to pharmacy tomorrow 7/22.  Thank you     Requested Prescriptions   Pending Prescriptions Disp Refills     methocarbamol (ROBAXIN) 750 MG tablet 90 tablet 2     Sig: Take 1 tablet (750 mg) by mouth 3 times daily as needed for muscle spasms       There is no refill protocol information for this order          Nkechi Colunga RN 07/21/22 6:12 PM    Reason for Disposition    [1] Caller requesting a NON-URGENT new prescription or refill AND [2] triager unable to refill per unit policy    Additional Information    Negative: Drug overdose and triager unable to answer question    Negative: Caller requesting information unrelated to medicine    Negative: Caller requesting a prescription for Strep throat and has a positive culture result    Negative: Rash while taking a medication or within 3 days of stopping it    Negative: Immunization reaction suspected    Negative: [1] Asthma and [2] having symptoms of asthma (cough, wheezing, etc.)    Negative: [1] Influenza symptoms AND [2] anti-viral med prescription request, such as Tamiflu    Negative: [1] Symptom of illness (e.g., headache, abdominal pain, earache, vomiting) AND [2] more than mild    Negative: MORE THAN A DOUBLE DOSE of a prescription or over-the-counter (OTC) drug    Negative: [1] DOUBLE DOSE (an extra dose or lesser amount) of over-the-counter (OTC) drug AND [2] any symptoms (e.g., dizziness, nausea, pain, sleepiness)    Negative: [1]  "DOUBLE DOSE (an extra dose or lesser amount) of prescription drug AND [2] any symptoms (e.g., dizziness, nausea, pain, sleepiness)    Negative: Took another person's prescription drug    Negative: [1] DOUBLE DOSE (an extra dose or lesser amount) of prescription drug AND [2] NO symptoms (Exception: a double dose of antibiotics)    Negative: Diabetes drug error or overdose (e.g., took wrong type of insulin or took extra dose)    Negative: [1] Request for URGENT new prescription or refill of \"essential\" medication (i.e., likelihood of harm to patient if not taken) AND [2] triager unable to fill per unit policy    Negative: [1] Prescription not at pharmacy AND [2] was prescribed by PCP recently    Negative: [1] Pharmacy calling with prescription questions AND [2] triager unable to answer question    Negative: [1] Caller has URGENT medication question about med that PCP or specialist prescribed AND [2] triager unable to answer question    Negative: [1] Caller has NON-URGENT medication question about med that PCP prescribed AND [2] triager unable to answer question    Protocols used: MEDICATION QUESTION CALL-A-AH      "

## 2022-07-22 RX ORDER — METHOCARBAMOL 750 MG/1
750 TABLET, FILM COATED ORAL 3 TIMES DAILY PRN
Qty: 90 TABLET | Refills: 2 | Status: SHIPPED | OUTPATIENT
Start: 2022-07-22 | End: 2023-01-09

## 2022-07-22 NOTE — TELEPHONE ENCOUNTER
Refill sent: sending 90 quantity so patient can take 3x/day if needed.  Thank you,  NAS Smith, NP-C  [facility]

## 2022-07-25 ENCOUNTER — ANCILLARY PROCEDURE (OUTPATIENT)
Dept: ULTRASOUND IMAGING | Facility: CLINIC | Age: 42
End: 2022-07-25
Attending: NURSE PRACTITIONER
Payer: MEDICARE

## 2022-07-25 DIAGNOSIS — N50.811 PAIN IN RIGHT TESTICLE: ICD-10-CM

## 2022-07-25 PROCEDURE — 76870 US EXAM SCROTUM: CPT | Performed by: RADIOLOGY

## 2022-07-26 NOTE — RESULT ENCOUNTER NOTE
Alvarado Craig,   Your testicle ultrasound is normal. Please let me know if you have questions.  Thank you,  NAS Smith, NP-C  [facility]

## 2022-08-02 ENCOUNTER — LAB (OUTPATIENT)
Dept: URGENT CARE | Facility: URGENT CARE | Age: 42
End: 2022-08-02
Attending: FAMILY MEDICINE
Payer: MEDICARE

## 2022-08-02 DIAGNOSIS — Z20.822 SUSPECTED 2019 NOVEL CORONAVIRUS INFECTION: ICD-10-CM

## 2022-08-02 PROCEDURE — U0005 INFEC AGEN DETEC AMPLI PROBE: HCPCS

## 2022-08-02 PROCEDURE — 99207 PR NO CHARGE LOS: CPT

## 2022-08-02 PROCEDURE — U0003 INFECTIOUS AGENT DETECTION BY NUCLEIC ACID (DNA OR RNA); SEVERE ACUTE RESPIRATORY SYNDROME CORONAVIRUS 2 (SARS-COV-2) (CORONAVIRUS DISEASE [COVID-19]), AMPLIFIED PROBE TECHNIQUE, MAKING USE OF HIGH THROUGHPUT TECHNOLOGIES AS DESCRIBED BY CMS-2020-01-R: HCPCS

## 2022-08-03 LAB — SARS-COV-2 RNA RESP QL NAA+PROBE: NEGATIVE

## 2022-08-26 ENCOUNTER — OFFICE VISIT (OUTPATIENT)
Dept: URGENT CARE | Facility: URGENT CARE | Age: 42
End: 2022-08-26
Payer: MEDICARE

## 2022-08-26 VITALS
OXYGEN SATURATION: 98 % | WEIGHT: 284.4 LBS | DIASTOLIC BLOOD PRESSURE: 75 MMHG | TEMPERATURE: 98.1 F | BODY MASS INDEX: 37.69 KG/M2 | HEART RATE: 89 BPM | SYSTOLIC BLOOD PRESSURE: 130 MMHG

## 2022-08-26 DIAGNOSIS — B35.3 TINEA PEDIS OF LEFT FOOT: ICD-10-CM

## 2022-08-26 DIAGNOSIS — L03.032 CELLULITIS OF TOE OF LEFT FOOT: Primary | ICD-10-CM

## 2022-08-26 PROCEDURE — 99213 OFFICE O/P EST LOW 20 MIN: CPT | Performed by: PHYSICIAN ASSISTANT

## 2022-08-26 ASSESSMENT — ENCOUNTER SYMPTOMS
NEUROLOGICAL NEGATIVE: 1
WOUND: 1
ABDOMINAL PAIN: 0
FREQUENCY: 0
COUGH: 0
FEVER: 0
COLOR CHANGE: 1
CHILLS: 0
CHEST TIGHTNESS: 0
WHEEZING: 0
DYSURIA: 0
MYALGIAS: 0
ALLERGIC/IMMUNOLOGIC NEGATIVE: 1
RESPIRATORY NEGATIVE: 1
DIARRHEA: 0
PALPITATIONS: 0
HEADACHES: 0
CONSTITUTIONAL NEGATIVE: 1
SORE THROAT: 0
MUSCULOSKELETAL NEGATIVE: 1
CARDIOVASCULAR NEGATIVE: 1
SHORTNESS OF BREATH: 0
HEMATURIA: 0
GASTROINTESTINAL NEGATIVE: 1
VOMITING: 0
NAUSEA: 0

## 2022-08-26 NOTE — PROGRESS NOTES
"Chief Complaint:     No chief complaint on file.         Assessment:     1. Laceration of left foot, initial encounter         Plan:    Imaging of the injured area for foreign body or fracture was not  Indicated    Wound closed per procedure note below.    Wound was cleaned with sterile saline and surgiscrub.  Antibiotic ointment and sterile dressing applied in clinic.     Discussed home wound care. Return to  with increased swelling, pain, redness, pus or fevers.  Follow up for {SUTURE/STAPLEREMOVAL:897266} removal in {NUMBERS(MULTIPLE):926701} days.       Patient was discharged in stable condition.  Patient verbalized understanding and agreed with this plan.      Procedure Note - Wound Repair:  Procedure performed by Cresencio Simeon.     Anesthesia was obtained with {Anesthesiaderm:782183} via {ANESTHESIA:842043}.  The wound, located on the ***, measured *** cm and was {LACERATION DESCRIPTION:5260::\"clean wound edges\",\"no foreign bodies\"}.  The level of complexity was: {SIMPLE COMPLEX:076867::\"simple\"} .  The neurovascular exam was ***.  It was cleaned with surgiscrub, irrigated with normal saline and explored.  The wound was closed using {SUTURE:282746} {suture technique:868827}.       SUBJECTIVE     HPI: Rafa Cochran is an 42 year old male that presents to clinic after cutting self on the L foot with a ***. He denies numbness of the foot. He denies dysfunction of the foot. Patient denies any loss of strength to the foot.  Patient is up to date on tetanus.     Review of Systems:  Review of Systems   Constitutional: Negative.  Negative for chills and fever.   HENT: Negative.  Negative for sore throat.    Respiratory: Negative.  Negative for cough, chest tightness, shortness of breath and wheezing.    Cardiovascular: Negative.  Negative for chest pain and palpitations.   Gastrointestinal: Negative.  Negative for abdominal pain, diarrhea, nausea and vomiting.   Genitourinary: Negative for dysuria, frequency, hematuria " and urgency.   Musculoskeletal: Negative.  Negative for myalgias.   Skin: Positive for wound. Negative for rash.   Allergic/Immunologic: Negative.  Negative for immunocompromised state.   Neurological: Negative.  Negative for headaches.         Family History   Family History   Problem Relation Age of Onset     C.A.D. Maternal Grandfather      Diabetes Maternal Grandfather      Hypertension Maternal Grandfather      Heart Disease Maternal Grandfather      Coronary Artery Disease Maternal Grandfather      Cerebrovascular Disease Maternal Grandfather      C.A.D. Paternal Grandmother      Diabetes Paternal Grandmother      Circulatory Paternal Grandmother      Gastrointestinal Disease Paternal Grandmother      Hypertension Paternal Grandfather      Circulatory Paternal Grandfather      Heart Disease Paternal Grandfather      Gastrointestinal Disease Paternal Grandfather      Heart Failure Paternal Grandfather      Coronary Artery Disease Paternal Grandfather      Hypertension Father      Alcohol/Drug Father      Allergies Father      Depression Father      Gastrointestinal Disease Father      Prostate Cancer Father      Esophageal Cancer Father      Mental Illness Father      Substance Abuse Father      Cerebrovascular Disease Maternal Grandmother      Arthritis Maternal Grandmother      Heart Disease Maternal Grandmother      Alcohol/Drug Mother      Allergies Mother      Circulatory Mother      Depression Mother      Heart Disease Mother      Diabetes Mother      Cerebrovascular Disease Mother         occurred during open heart surgery     Anxiety Disorder Mother      Mental Illness Mother      Asthma Mother      Alcohol/Drug Sister      Allergies Sister      Depression Sister      Genitourinary Problems Sister      Hypertension Sister      Depression Sister      Anxiety Disorder Sister      Mental Illness Sister      Obesity Sister      Depression Sister      Anxiety Disorder Sister      Mental Illness Sister       Depression Brother      Anxiety Disorder Brother      Mental Illness Brother      Asthma Brother         Problem history  Patient Active Problem List   Diagnosis     Major depression in partial remission (H)     Bipolar affective disorder (H)     OCD (obsessive compulsive disorder)     Schizoaffective disorder (H)     Sinus tachycardia     Palpitations     Prediabetes     BABATUNDE (obstructive sleep apnea)- moderate (AHI 17)     Morbid obesity (H)     Gastroesophageal reflux disease, esophagitis presence not specified     Family history of cardiac arrhythmia     H/O esophageal ulcer     Mixed hyperlipidemia        Allergies  Allergies   Allergen Reactions     Nicotine Rash     Nicotine Patches     Pertussis Toxoid      Bad reaction as a child        Social History  Social History     Socioeconomic History     Marital status: Single     Spouse name: Not on file     Number of children: Not on file     Years of education: Not on file     Highest education level: Not on file   Occupational History     Not on file   Tobacco Use     Smoking status: Former Smoker     Years: 18.00     Quit date: 2013     Years since quittin.6     Smokeless tobacco: Never Used     Tobacco comment: 5 cigs per day   Substance and Sexual Activity     Alcohol use: Yes     Comment: extremely rare     Drug use: No     Sexual activity: Not Currently     Partners: Female     Birth control/protection: None   Other Topics Concern     Parent/sibling w/ CABG, MI or angioplasty before 65F 55M? No   Social History Narrative     Not on file     Social Determinants of Health     Financial Resource Strain: Not on file   Food Insecurity: Not on file   Transportation Needs: Not on file   Physical Activity: Not on file   Stress: Not on file   Social Connections: Not on file   Intimate Partner Violence: Not on file   Housing Stability: Not on file        Current Meds    Current Outpatient Medications:      gabapentin (NEURONTIN) 100 MG capsule, Take 3  capsules (300 mg) by mouth 3 times daily And an additional 200 mg prn, Disp: , Rfl:      LORazepam (ATIVAN) 2 MG tablet, Take 1 tablet (2 mg) by mouth 3 times daily as needed for anxiety Patient taking 3 mg in the morning, 2 mg in the afternoon and 2 mg in the evening., Disp: 30 tablet, Rfl: 0     methocarbamol (ROBAXIN) 750 MG tablet, Take 1 tablet (750 mg) by mouth 3 times daily as needed for muscle spasms, Disp: 90 tablet, Rfl: 2     metoclopramide (REGLAN) 5 MG tablet, Take 1 tablet (5 mg) by mouth 3 times daily as needed (nausea), Disp: 60 tablet, Rfl: 0     mirtazapine (REMERON) 15 MG tablet, 15 mg Patient taking 1.5 tablets at bedtime, Disp: , Rfl: 2     Multiple Vitamins-Minerals (MENS MULTIVITAMIN) TABS, Take 1 tablet by mouth daily, Disp: 90 tablet, Rfl: 3     omeprazole (PRILOSEC) 20 MG DR capsule, Take 1 capsule (20 mg) by mouth daily, Disp: 90 capsule, Rfl: 0     order for DME, autoCPAP 8-12cm, Disp:  , Rfl:      OBJECTIVE    Vitals reviewed by Alex Simeon PA-C  There were no vitals taken for this visit.    Physical Exam:    Physical Exam  Vitals and nursing note reviewed.   Constitutional:       General: He is not in acute distress.     Appearance: He is well-developed. He is not ill-appearing, toxic-appearing or diaphoretic.   HENT:      Head: Normocephalic and atraumatic.      Right Ear: Hearing, tympanic membrane, ear canal and external ear normal. Tympanic membrane is not perforated, erythematous, retracted or bulging.      Left Ear: Hearing, tympanic membrane, ear canal and external ear normal. Tympanic membrane is not perforated, erythematous, retracted or bulging.      Nose: Nose normal. No mucosal edema, congestion or rhinorrhea.      Mouth/Throat:      Pharynx: No oropharyngeal exudate or posterior oropharyngeal erythema.      Tonsils: No tonsillar exudate or tonsillar abscesses. 0 on the right. 0 on the left.   Eyes:      Pupils: Pupils are equal, round, and reactive to light.    Cardiovascular:      Rate and Rhythm: Normal rate and regular rhythm.      Heart sounds: Normal heart sounds, S1 normal and S2 normal. Heart sounds not distant. No murmur heard.    No friction rub. No gallop.   Pulmonary:      Effort: Pulmonary effort is normal. No respiratory distress.      Breath sounds: Normal breath sounds. No decreased breath sounds, wheezing, rhonchi or rales.   Abdominal:      General: Bowel sounds are normal. There is no distension.      Palpations: Abdomen is soft.      Tenderness: There is no abdominal tenderness.   Musculoskeletal:      Cervical back: Normal range of motion and neck supple.   Lymphadenopathy:      Cervical: No cervical adenopathy.   Skin:     General: Skin is warm and dry.      Findings: No rash.   Neurological:      Mental Status: He is alert.      Cranial Nerves: No cranial nerve deficit.   Psychiatric:         Attention and Perception: He is attentive.         Speech: Speech normal.         Behavior: Behavior normal. Behavior is cooperative.         Thought Content: Thought content normal.         Judgment: Judgment normal.         Alex Simeon PA-C 4:45 PM

## 2022-08-26 NOTE — PROGRESS NOTES
Chief Complaint:    Chief Complaint   Patient presents with     Wound Check     injury unknown thinks skin maybe split open on the end of the toe on left foot , pain is not as bad as it was yesterday, noticed it yesterday, painful to walk on it, possibly infected, red, swollen         Medical Decision Making:    Vital signs reviewed by Alex Simeon PA-C  /75 (BP Location: Left arm, Patient Position: Sitting, Cuff Size: Adult Large)   Pulse 89   Temp 98.1  F (36.7  C) (Tympanic)   Wt 129 kg (284 lb 6.4 oz)   SpO2 98%   BMI 37.69 kg/m      Differential Diagnosis:  Cellulitis.     ASSESSMENT:     1. Cellulitis of toe of left foot    2. Tinea pedis of left foot       PLAN:     Rx for Augmentin for cellulitis.  Order placed for follow up with podiatry for possible fungal infection with skin breakdown.  Keep toe clean and dry.  Continue OTC fungal medication.  Worrisome symptoms discussed with instructions to go to the ED.  Patient verbalized understanding and agreed with this plan.    Labs:     No results found for any visits on 08/26/22.    Current Meds:    Current Outpatient Medications:      amoxicillin-clavulanate (AUGMENTIN) 875-125 MG tablet, Take 1 tablet by mouth 2 times daily for 7 days, Disp: 14 tablet, Rfl: 0     gabapentin (NEURONTIN) 100 MG capsule, Take 3 capsules (300 mg) by mouth 3 times daily And an additional 200 mg prn, Disp: , Rfl:      LORazepam (ATIVAN) 2 MG tablet, Take 1 tablet (2 mg) by mouth 3 times daily as needed for anxiety Patient taking 3 mg in the morning, 2 mg in the afternoon and 2 mg in the evening., Disp: 30 tablet, Rfl: 0     methocarbamol (ROBAXIN) 750 MG tablet, Take 1 tablet (750 mg) by mouth 3 times daily as needed for muscle spasms, Disp: 90 tablet, Rfl: 2     mirtazapine (REMERON) 15 MG tablet, 15 mg Patient taking 1.5 tablets at bedtime, Disp: , Rfl: 2     omeprazole (PRILOSEC) 20 MG DR capsule, Take 1 capsule (20 mg) by mouth daily, Disp: 90 capsule, Rfl: 0      order for DME, autoCPAP 8-12cm, Disp:  , Rfl:      metoclopramide (REGLAN) 5 MG tablet, Take 1 tablet (5 mg) by mouth 3 times daily as needed (nausea) (Patient not taking: Reported on 8/26/2022), Disp: 60 tablet, Rfl: 0     Multiple Vitamins-Minerals (MENS MULTIVITAMIN) TABS, Take 1 tablet by mouth daily (Patient not taking: Reported on 8/26/2022), Disp: 90 tablet, Rfl: 3    Allergies:  Allergies   Allergen Reactions     Nicotine Rash     Nicotine Patches     Pertussis Toxoid      Bad reaction as a child       SUBJECTIVE    HPI: Rafa Cochran is an 42 year old male who presents for evaluation and treatment of L pinky toe pain, redness, and swelling.   Patient noticed symptoms yesterday.  The redness and swelling of the toe have worsened.   He has been having some problems with athletes foot also.  He denies any numbness, tingling, or dysfunction of the foot.  He is able to walk on the L foot.    ROS:      Review of Systems   Constitutional: Negative.  Negative for chills and fever.   HENT: Negative.  Negative for sore throat.    Respiratory: Negative.  Negative for cough, chest tightness, shortness of breath and wheezing.    Cardiovascular: Negative.  Negative for chest pain and palpitations.   Gastrointestinal: Negative.  Negative for abdominal pain, diarrhea, nausea and vomiting.   Genitourinary: Negative for dysuria, frequency, hematuria and urgency.   Musculoskeletal: Negative.  Negative for myalgias.   Skin: Positive for color change and wound. Negative for rash.   Allergic/Immunologic: Negative.  Negative for immunocompromised state.   Neurological: Negative.  Negative for headaches.        Family History   Family History   Problem Relation Age of Onset     C.A.D. Maternal Grandfather      Diabetes Maternal Grandfather      Hypertension Maternal Grandfather      Heart Disease Maternal Grandfather      Coronary Artery Disease Maternal Grandfather      Cerebrovascular Disease Maternal Grandfather      C.A.D.  Paternal Grandmother      Diabetes Paternal Grandmother      Circulatory Paternal Grandmother      Gastrointestinal Disease Paternal Grandmother      Hypertension Paternal Grandfather      Circulatory Paternal Grandfather      Heart Disease Paternal Grandfather      Gastrointestinal Disease Paternal Grandfather      Heart Failure Paternal Grandfather      Coronary Artery Disease Paternal Grandfather      Hypertension Father      Alcohol/Drug Father      Allergies Father      Depression Father      Gastrointestinal Disease Father      Prostate Cancer Father      Esophageal Cancer Father      Mental Illness Father      Substance Abuse Father      Cerebrovascular Disease Maternal Grandmother      Arthritis Maternal Grandmother      Heart Disease Maternal Grandmother      Alcohol/Drug Mother      Allergies Mother      Circulatory Mother      Depression Mother      Heart Disease Mother      Diabetes Mother      Cerebrovascular Disease Mother         occurred during open heart surgery     Anxiety Disorder Mother      Mental Illness Mother      Asthma Mother      Alcohol/Drug Sister      Allergies Sister      Depression Sister      Genitourinary Problems Sister      Hypertension Sister      Depression Sister      Anxiety Disorder Sister      Mental Illness Sister      Obesity Sister      Depression Sister      Anxiety Disorder Sister      Mental Illness Sister      Depression Brother      Anxiety Disorder Brother      Mental Illness Brother      Asthma Brother        Social History  Social History     Socioeconomic History     Marital status: Single     Spouse name: Not on file     Number of children: Not on file     Years of education: Not on file     Highest education level: Not on file   Occupational History     Not on file   Tobacco Use     Smoking status: Former Smoker     Years: 18.00     Quit date: 2013     Years since quittin.6     Smokeless tobacco: Never Used     Tobacco comment: 5 cigs per day    Substance and Sexual Activity     Alcohol use: Yes     Comment: extremely rare     Drug use: No     Sexual activity: Not Currently     Partners: Female     Birth control/protection: None   Other Topics Concern     Parent/sibling w/ CABG, MI or angioplasty before 65F 55M? No   Social History Narrative     Not on file     Social Determinants of Health     Financial Resource Strain: Not on file   Food Insecurity: Not on file   Transportation Needs: Not on file   Physical Activity: Not on file   Stress: Not on file   Social Connections: Not on file   Intimate Partner Violence: Not on file   Housing Stability: Not on file        Surgical History:  Past Surgical History:   Procedure Laterality Date     APPENDECTOMY       COMBINED ESOPHAGOSCOPY, GASTROSCOPY, DUODENOSCOPY (EGD) WITH CO2 INSUFFLATION N/A 11/25/2019    Procedure: ESOPHAGOGASTRODUODENOSCOPY, WITH CO2 INSUFFLATION;  Surgeon: Sabas Grajeda MD;  Location:  OR     ESOPHAGOSCOPY, GASTROSCOPY, DUODENOSCOPY (EGD), COMBINED N/A 11/25/2019    Procedure: Esophagogastroduodenoscopy, With Biopsy;  Surgeon: Sabas Grajeda MD;  Location:  OR     GENITOURINARY SURGERY  1988        Problem List:  Patient Active Problem List   Diagnosis     Major depression in partial remission (H)     Bipolar affective disorder (H)     OCD (obsessive compulsive disorder)     Schizoaffective disorder (H)     Sinus tachycardia     Palpitations     Prediabetes     BABATUNDE (obstructive sleep apnea)- moderate (AHI 17)     Morbid obesity (H)     Gastroesophageal reflux disease, esophagitis presence not specified     Family history of cardiac arrhythmia     H/O esophageal ulcer     Mixed hyperlipidemia           OBJECTIVE:     Vital signs noted and reviewed by Alex Simeon PA-C  /75 (BP Location: Left arm, Patient Position: Sitting, Cuff Size: Adult Large)   Pulse 89   Temp 98.1  F (36.7  C) (Tympanic)   Wt 129 kg (284 lb 6.4 oz)   SpO2 98%   BMI 37.69  kg/m       PEFR:    Physical Exam  Vitals and nursing note reviewed.   Constitutional:       General: He is not in acute distress.     Appearance: He is well-developed. He is not ill-appearing, toxic-appearing or diaphoretic.   HENT:      Head: Normocephalic and atraumatic.      Right Ear: Hearing, tympanic membrane, ear canal and external ear normal. Tympanic membrane is not perforated, erythematous, retracted or bulging.      Left Ear: Hearing, tympanic membrane, ear canal and external ear normal. Tympanic membrane is not perforated, erythematous, retracted or bulging.      Nose: Nose normal. No mucosal edema, congestion or rhinorrhea.      Mouth/Throat:      Pharynx: No oropharyngeal exudate or posterior oropharyngeal erythema.      Tonsils: No tonsillar exudate or tonsillar abscesses. 0 on the right. 0 on the left.   Eyes:      Pupils: Pupils are equal, round, and reactive to light.   Cardiovascular:      Rate and Rhythm: Normal rate and regular rhythm.      Heart sounds: Normal heart sounds, S1 normal and S2 normal. Heart sounds not distant. No murmur heard.    No friction rub. No gallop.   Pulmonary:      Effort: Pulmonary effort is normal. No respiratory distress.      Breath sounds: Normal breath sounds. No decreased breath sounds, wheezing, rhonchi or rales.   Abdominal:      General: Bowel sounds are normal. There is no distension.      Palpations: Abdomen is soft.      Tenderness: There is no abdominal tenderness.   Musculoskeletal:      Cervical back: Normal range of motion and neck supple.      Left foot: Swelling present.      Comments: Swelling and redness of the L pinky toe.  Skin loss with open wound on the plantar side of the pinky toe MTP joint   Lymphadenopathy:      Cervical: No cervical adenopathy.   Skin:     General: Skin is warm and dry.      Findings: No rash.   Neurological:      Mental Status: He is alert.      Cranial Nerves: No cranial nerve deficit.   Psychiatric:         Attention and  Perception: He is attentive.         Speech: Speech normal.         Behavior: Behavior normal. Behavior is cooperative.         Thought Content: Thought content normal.         Judgment: Judgment normal.             Alex Simeon PA-C  8/26/2022, 4:54 PM

## 2022-08-29 NOTE — TELEPHONE ENCOUNTER
DIAGNOSIS: Tinea pedis of left foot   APPOINTMENT DATE: 08/31/2022   NOTES STATUS DETAILS   OFFICE NOTE from referring provider Internal 08/26/2022 Dr Simeon [facility]    OFFICE NOTE from other specialist N/A    DISCHARGE SUMMARY from hospital N/A    DISCHARGE REPORT from the ER N/A    OPERATIVE REPORT N/A    EMG report N/A    MEDICATION LIST N/A    MRI N/A    DEXA (osteoporosis/bone health) N/A    CT SCAN N/A    XRAYS (IMAGES & REPORTS) N/A

## 2022-08-31 ENCOUNTER — OFFICE VISIT (OUTPATIENT)
Dept: PODIATRY | Facility: CLINIC | Age: 42
End: 2022-08-31
Payer: MEDICARE

## 2022-08-31 ENCOUNTER — PRE VISIT (OUTPATIENT)
Dept: PODIATRY | Facility: CLINIC | Age: 42
End: 2022-08-31

## 2022-08-31 VITALS
HEART RATE: 90 BPM | SYSTOLIC BLOOD PRESSURE: 130 MMHG | DIASTOLIC BLOOD PRESSURE: 80 MMHG | BODY MASS INDEX: 37.64 KG/M2 | WEIGHT: 284 LBS

## 2022-08-31 DIAGNOSIS — B35.1 ONYCHOMYCOSIS: ICD-10-CM

## 2022-08-31 DIAGNOSIS — L03.119 CELLULITIS AND ABSCESS OF FOOT, EXCEPT TOES: ICD-10-CM

## 2022-08-31 DIAGNOSIS — B35.3 TINEA PEDIS OF LEFT FOOT: ICD-10-CM

## 2022-08-31 DIAGNOSIS — L02.619 CELLULITIS AND ABSCESS OF FOOT, EXCEPT TOES: ICD-10-CM

## 2022-08-31 DIAGNOSIS — R23.4 FISSURE IN SKIN OF FOOT: Primary | ICD-10-CM

## 2022-08-31 PROCEDURE — 99203 OFFICE O/P NEW LOW 30 MIN: CPT | Performed by: PODIATRIST

## 2022-08-31 NOTE — PATIENT INSTRUCTIONS
We wish you continued good healing. If you have any questions or concerns, please do not hesitate to contact us at  [phone]    [employer] (secure e-mail communication and access to your chart) to send a message or to make an appointment.    Please remember to call and schedule a follow up appointment if one was recommended at your earliest convenience.     PODIATRY CLINIC HOURS  TELEPHONE NUMBER    Dr. Williams [facility]PELL[facility]        Clinics:  Guy Gilman CMA   Tuesday 1PM-6PM  [place]  Wednesday 745AM-330PM  [place]  Thursday/Friday 745AM-230PM  [place] APPOINT[facility]  [phone]    [place] APPOINTMENTS  [phone]        If you need a medication refill, please contact us you may need lab work and/or a follow up visit prior to your refill (i.e. Antifungal medications).  If MRI needed please call Imaging at [phone] or [phone]  HOW DO I GET MY KNEE SCOOTER? Knee scooters can be picked up at ANY Medical Supply stores with your knee scooter Prescription.  OR  Bring your signed prescription to an [facility] showroom.

## 2022-08-31 NOTE — LETTER
8/31/2022         RE: Rafa Cochran  [address]  [place] [address]        Dear Colleague,    Thank you for referring your patient, Rafa Cochran, to the [facility]. Please see a copy of my visit note below.    Patient seen today in consult from Alex Simeon and complains of cellulitis left fifth toe.  Started recently.  Seen in urgent care and placed on Augmentin.  Much better.  He believes there is a fissure in his skin causing this.  Denies drainage.  Denies purulence or odor.  He also has dry skin in his feet.  Has thick hallux nails as well.  Topical antifungals helped.  Patient states he has a history of fatty liver disease.    ROS:  see above         Allergies   Allergen Reactions     Nicotine Rash     Nicotine Patches     Pertussis Toxoid      Bad reaction as a child       Current Outpatient Medications   Medication Sig Dispense Refill     amoxicillin-clavulanate (AUGMENTIN) 875-125 MG tablet Take 1 tablet by mouth 2 times daily for 7 days 14 tablet 0     gabapentin (NEURONTIN) 100 MG capsule Take 3 capsules (300 mg) by mouth 3 times daily And an additional 200 mg prn       LORazepam (ATIVAN) 2 MG tablet Take 1 tablet (2 mg) by mouth 3 times daily as needed for anxiety Patient taking 3 mg in the morning, 2 mg in the afternoon and 2 mg in the evening. 30 tablet 0     methocarbamol (ROBAXIN) 750 MG tablet Take 1 tablet (750 mg) by mouth 3 times daily as needed for muscle spasms 90 tablet 2     metoclopramide (REGLAN) 5 MG tablet Take 1 tablet (5 mg) by mouth 3 times daily as needed (nausea) (Patient not taking: Reported on 8/26/2022) 60 tablet 0     mirtazapine (REMERON) 15 MG tablet 15 mg Patient taking 1.5 tablets at bedtime  2     Multiple Vitamins-Minerals (MENS MULTIVITAMIN) TABS Take 1 tablet by mouth daily (Patient not taking: Reported on 8/26/2022) 90 tablet 3     omeprazole (PRILOSEC) 20 MG DR capsule Take 1 capsule (20 mg) by mouth daily 90 capsule 0      order for DME autoCPAP 8-12cm         Patient Active Problem List   Diagnosis     Major depression in partial remission (H)     Bipolar affective disorder (H)     OCD (obsessive compulsive disorder)     Schizoaffective disorder (H)     Sinus tachycardia     Palpitations     Prediabetes     BABATUNDE (obstructive sleep apnea)- moderate (AHI 17)     Morbid obesity (H)     Gastroesophageal reflux disease, esophagitis presence not specified     Family history of cardiac arrhythmia     H/O esophageal ulcer     Mixed hyperlipidemia       Past Medical History:   Diagnosis Date     Depressive disorder 1991     H/O esophageal ulcer 2013     Nephrolithiasis 2009     Panic attack 07/05/2013     Shoulder pain, left 10/19/2015     Sprain and strain of shoulder and upper arm, left, initial encounter 10/19/2015       Past Surgical History:   Procedure Laterality Date     APPENDECTOMY       COMBINED ESOPHAGOSCOPY, GASTROSCOPY, DUODENOSCOPY (EGD) WITH CO2 INSUFFLATION N/A 11/25/2019    Procedure: ESOPHAGOGASTRODUODENOSCOPY, WITH CO2 INSUFFLATION;  Surgeon: Sabas Grajeda MD;  Location: [facility] OR     ESOPHAGOSCOPY, GASTROSCOPY, DUODENOSCOPY (EGD), COMBINED N/A 11/25/2019    Procedure: Esophagogastroduodenoscopy, With Biopsy;  Surgeon: Sabas Grajeda MD;  Location: [facility] OR     GENITOURINARY SURGERY  1988       Family History   Problem Relation Age of Onset     C.A.D. Maternal Grandfather      Diabetes Maternal Grandfather      Hypertension Maternal Grandfather      Heart Disease Maternal Grandfather      Coronary Artery Disease Maternal Grandfather      Cerebrovascular Disease Maternal Grandfather      C.A.D. Paternal Grandmother      Diabetes Paternal Grandmother      Circulatory Paternal Grandmother      Gastrointestinal Disease Paternal Grandmother      Hypertension Paternal Grandfather      Circulatory Paternal Grandfather      Heart Disease Paternal Grandfather      Gastrointestinal Disease Paternal Grandfather       Heart Failure Paternal Grandfather      Coronary Artery Disease Paternal Grandfather      Hypertension Father      Alcohol/Drug Father      Allergies Father      Depression Father      Gastrointestinal Disease Father      Prostate Cancer Father      Esophageal Cancer Father      Mental Illness Father      Substance Abuse Father      Cerebrovascular Disease Maternal Grandmother      Arthritis Maternal Grandmother      Heart Disease Maternal Grandmother      Alcohol/Drug Mother      Allergies Mother      Circulatory Mother      Depression Mother      Heart Disease Mother      Diabetes Mother      Cerebrovascular Disease Mother         occurred during open heart surgery     Anxiety Disorder Mother      Mental Illness Mother      Asthma Mother      Alcohol/Drug Sister      Allergies Sister      Depression Sister      Genitourinary Problems Sister      Hypertension Sister      Depression Sister      Anxiety Disorder Sister      Mental Illness Sister      Obesity Sister      Depression Sister      Anxiety Disorder Sister      Mental Illness Sister      Depression Brother      Anxiety Disorder Brother      Mental Illness Brother      Asthma Brother        Social History     Tobacco Use     Smoking status: Former Smoker     Years: 18.00     Quit date: 2013     Years since quittin.6     Smokeless tobacco: Never Used     Tobacco comment: 5 cigs per day   Substance Use Topics     Alcohol use: Yes     Comment: extremely rare         Exam:    Vitals: /80   Pulse 90   Wt 128.8 kg (284 lb)   BMI 37.64 kg/m    BMI: Body mass index is 37.64 kg/m .  Height: Data Unavailable    Constitutional/ general:  Pt is in no apparent distress, appears well-nourished.  Cooperative with history and physical exam.     Psych:  The patient answered questions appropriately.  Normal affect.  Seems to have reasonable expectations, in terms of treatment.     Lungs:  Non labored breathing, non labored speech. No cough.  No audible  wheezing. Even, quiet breathing.       Vascular:  positive pedal pulses bilaterally for both the DP and PT arteries.  CFT < 3 sec.  positive ankle edema.  positive pedal hair growth.    Neuro:  Alert and oriented x 3. Coordinated gait.  Light touch sensation is intact     Musculoskeletal:    Lower extremity muscle strength is normal.  Patient is ambulatory without an assistive device or brace.   Normal arch with weightbearing.  No forefoot or rear foot deformities noted.  MS 5/5 all compartments.  Normal ROM all fore foot and rearfoot joints.  No equinus.     Derm: Normal texture and turgor.  Skin dry moccasin distribution.  Hallux nails mycotic.  Left fourth interspace macerated.  Fissure plantar medial fifth toe at sulcus.  No purulence odor or drainage.  No sinus tracts.  Erythema mostly resolved in this toe.    A/P    Left toe cellulitis  Left fissure  Left chronic tinea  Left onychomycosis.    Discussed with patient how maceratedcan cause fissures.  We will keep this dry with labs well and discussed other options for keeping feet dry.  Personally reviewed urgent care note and recent labs.  His past LFTs are normal.  Discussed options for treating onychomycosis.  Since cosmetic he will watch this for now.  He will continue using antifungals on his skin and discussed strategies for preventing fungus.  Return to clinic prn.    Williams Murillo DPM, FACFAS          Again, thank you for allowing me to participate in the care of your patient.        Sincerely,        Williams Murillo DPM

## 2022-09-02 NOTE — PROGRESS NOTES
Patient seen today in consult from Alex Simeon and complains of cellulitis left fifth toe.  Started recently.  Seen in urgent care and placed on Augmentin.  Much better.  He believes there is a fissure in his skin causing this.  Denies drainage.  Denies purulence or odor.  He also has dry skin in his feet.  Has thick hallux nails as well.  Topical antifungals helped.  Patient states he has a history of fatty liver disease.    ROS:  see above         Allergies   Allergen Reactions     Nicotine Rash     Nicotine Patches     Pertussis Toxoid      Bad reaction as a child       Current Outpatient Medications   Medication Sig Dispense Refill     amoxicillin-clavulanate (AUGMENTIN) 875-125 MG tablet Take 1 tablet by mouth 2 times daily for 7 days 14 tablet 0     gabapentin (NEURONTIN) 100 MG capsule Take 3 capsules (300 mg) by mouth 3 times daily And an additional 200 mg prn       LORazepam (ATIVAN) 2 MG tablet Take 1 tablet (2 mg) by mouth 3 times daily as needed for anxiety Patient taking 3 mg in the morning, 2 mg in the afternoon and 2 mg in the evening. 30 tablet 0     methocarbamol (ROBAXIN) 750 MG tablet Take 1 tablet (750 mg) by mouth 3 times daily as needed for muscle spasms 90 tablet 2     metoclopramide (REGLAN) 5 MG tablet Take 1 tablet (5 mg) by mouth 3 times daily as needed (nausea) (Patient not taking: Reported on 8/26/2022) 60 tablet 0     mirtazapine (REMERON) 15 MG tablet 15 mg Patient taking 1.5 tablets at bedtime  2     Multiple Vitamins-Minerals (MENS MULTIVITAMIN) TABS Take 1 tablet by mouth daily (Patient not taking: Reported on 8/26/2022) 90 tablet 3     omeprazole (PRILOSEC) 20 MG DR capsule Take 1 capsule (20 mg) by mouth daily 90 capsule 0     order for DME autoCPAP 8-12cm         Patient Active Problem List   Diagnosis     Major depression in partial remission (H)     Bipolar affective disorder (H)     OCD (obsessive compulsive disorder)     Schizoaffective disorder (H)     Sinus tachycardia      Palpitations     Prediabetes     BABATUNDE (obstructive sleep apnea)- moderate (AHI 17)     Morbid obesity (H)     Gastroesophageal reflux disease, esophagitis presence not specified     Family history of cardiac arrhythmia     H/O esophageal ulcer     Mixed hyperlipidemia       Past Medical History:   Diagnosis Date     Depressive disorder 1991     H/O esophageal ulcer 2013     Nephrolithiasis 2009     Panic attack 07/05/2013     Shoulder pain, left 10/19/2015     Sprain and strain of shoulder and upper arm, left, initial encounter 10/19/2015       Past Surgical History:   Procedure Laterality Date     APPENDECTOMY       COMBINED ESOPHAGOSCOPY, GASTROSCOPY, DUODENOSCOPY (EGD) WITH CO2 INSUFFLATION N/A 11/25/2019    Procedure: ESOPHAGOGASTRODUODENOSCOPY, WITH CO2 INSUFFLATION;  Surgeon: Sabas Grajeda MD;  Location: [facility]     ESOPHAGOSCOPY, GASTROSCOPY, DUODENOSCOPY (EGD), COMBINED N/A 11/25/2019    Procedure: Esophagogastroduodenoscopy, With Biopsy;  Surgeon: Sabas Grajeda MD;  Location: [facility]     GENITOURINARY SURGERY  1988       Family History   Problem Relation Age of Onset     C.A.D. Maternal Grandfather      Diabetes Maternal Grandfather      Hypertension Maternal Grandfather      Heart Disease Maternal Grandfather      Coronary Artery Disease Maternal Grandfather      Cerebrovascular Disease Maternal Grandfather      C.A.D. Paternal Grandmother      Diabetes Paternal Grandmother      Circulatory Paternal Grandmother      Gastrointestinal Disease Paternal Grandmother      Hypertension Paternal Grandfather      Circulatory Paternal Grandfather      Heart Disease Paternal Grandfather      Gastrointestinal Disease Paternal Grandfather      Heart Failure Paternal Grandfather      Coronary Artery Disease Paternal Grandfather      Hypertension Father      Alcohol/Drug Father      Allergies Father      Depression Father      Gastrointestinal Disease Father      Prostate Cancer Father       Esophageal Cancer Father      Mental Illness Father      Substance Abuse Father      Cerebrovascular Disease Maternal Grandmother      Arthritis Maternal Grandmother      Heart Disease Maternal Grandmother      Alcohol/Drug Mother      Allergies Mother      Circulatory Mother      Depression Mother      Heart Disease Mother      Diabetes Mother      Cerebrovascular Disease Mother         occurred during open heart surgery     Anxiety Disorder Mother      Mental Illness Mother      Asthma Mother      Alcohol/Drug Sister      Allergies Sister      Depression Sister      Genitourinary Problems Sister      Hypertension Sister      Depression Sister      Anxiety Disorder Sister      Mental Illness Sister      Obesity Sister      Depression Sister      Anxiety Disorder Sister      Mental Illness Sister      Depression Brother      Anxiety Disorder Brother      Mental Illness Brother      Asthma Brother        Social History     Tobacco Use     Smoking status: Former Smoker     Years: 18.00     Quit date: 2013     Years since quittin.6     Smokeless tobacco: Never Used     Tobacco comment: 5 cigs per day   Substance Use Topics     Alcohol use: Yes     Comment: extremely rare         Exam:    Vitals: /80   Pulse 90   Wt 128.8 kg (284 lb)   BMI 37.64 kg/m    BMI: Body mass index is 37.64 kg/m .  Height: Data Unavailable    Constitutional/ general:  Pt is in no apparent distress, appears well-nourished.  Cooperative with history and physical exam.     Psych:  The patient answered questions appropriately.  Normal affect.  Seems to have reasonable expectations, in terms of treatment.     Lungs:  Non labored breathing, non labored speech. No cough.  No audible wheezing. Even, quiet breathing.       Vascular:  positive pedal pulses bilaterally for both the DP and PT arteries.  CFT < 3 sec.  positive ankle edema.  positive pedal hair growth.    Neuro:  Alert and oriented x 3. Coordinated gait.  Light touch  sensation is intact     Musculoskeletal:    Lower extremity muscle strength is normal.  Patient is ambulatory without an assistive device or brace.   Normal arch with weightbearing.  No forefoot or rear foot deformities noted.  MS 5/5 all compartments.  Normal ROM all fore foot and rearfoot joints.  No equinus.     Derm: Normal texture and turgor.  Skin dry moccasin distribution.  Hallux nails mycotic.  Left fourth interspace macerated.  Fissure plantar medial fifth toe at sulcus.  No purulence odor or drainage.  No sinus tracts.  Erythema mostly resolved in this toe.    A/P    Left toe cellulitis  Left fissure  Left chronic tinea  Left onychomycosis.    Discussed with patient how maceratedcan cause fissures.  We will keep this dry with labs well and discussed other options for keeping feet dry.  Personally reviewed urgent care note and recent labs.  His past LFTs are normal.  Discussed options for treating onychomycosis.  Since cosmetic he will watch this for now.  He will continue using antifungals on his skin and discussed strategies for preventing fungus.  Return to clinic prn.    Williams Murillo DPM, FACFAS

## 2022-10-08 ENCOUNTER — NURSE TRIAGE (OUTPATIENT)
Dept: NURSING | Facility: CLINIC | Age: 42
End: 2022-10-08

## 2022-10-08 NOTE — TELEPHONE ENCOUNTER
Caller reports recurrent pain in right testes   For  1 seek; testicle is alasys enderwithout swelling or  Urinary symptoms   does have episodes of  Pain from lower ba ck and hip and this is positional   Has had brief episodes of severe testicle pain ( last time 3 days ago)  that are debilitating and  Are  companied by nausea but go away.  Has no history of kidney stone.  Does have right hip arthritis. Notes pain is sometimes positional   Has  had work up in past 3 mos.  for  similar symptoms with negative  ultra sound on right testicle.   Denies any fever   Triage protocol reviewed   Advised  to be seen in clinic within 3 days; No current schedule openings , will route to clinic triage for follow up   Further advised that UC is option   Advised to go to ED if develops severe debilitating pain again.   Reason for Disposition    All other penis - scrotum symptoms  (Exception: painless rash < 24 hours duration)    Additional Information    Negative: Followed a genital area injury (e.g., penis, scrotum)    Negative: Pain or burning with passing urine is main symptom    Negative: Pain in scrotum or testicle is main symptom    Negative: Swollen scrotum OR lump in the scrotum/groin area    Negative: Pubic lice suspected    Negative: [1] Blood from end of penis AND [2] large amount    Negative: [1] Not circumcised AND [2] foreskin pulled back and stuck    Negative: [1] Looks infected (e.g., draining sore, ulcer, rash is painful to touch) AND [2] fever > 100.4 F (38.0 C)    Negative: [1] Unable to urinate (or only a few drops) > 4 hours AND [2] bladder feels very full (e.g., palpable bladder or strong urge to urinate)    Negative: [1] Prolonged unwanted erection (i.e., not related to sexual interest) AND [2] lasting > 4 hours    Negative: SEVERE pain (e.g., excruciating)    Negative: Patient sounds very sick or weak to the triager    Negative: [1] Pus or bloody discharge from the end of the penis AND [2] fever    Negative:  [1] Pain or burning with passing urine AND [2] fever > 100.4 F (38.0 C)    Negative: [1] Pain or burning with passing urine AND [2] side (flank) or back pain present    Negative: Entire penis is swollen (i.e., edema)    Negative: [1] Antibiotic treatment > 3 days for STI (e.g., penile discharge from gonorrhea, chlamydia) AND [2] painful urination not improved    Negative: Pus (white, yellow) or bloody discharge from end of penis    Negative: Pain or burning with passing urine    Negative: [1] Not circumcised AND [2] swollen foreskin    Negative: [1] Tiny water blisters rash AND [2] 3 or more    Negative: Looks infected (e.g., draining sore, ulcer, rash is painful to touch)    Negative: Blood in urine (red, pink, or tea-colored)    Negative: Severe itching    Negative: [1] Painless rash (e.g., redness, tiny bumps, sore) AND [2] present > 24 hours    Negative: Blood in semen    Negative: [1] Prolonged unwanted erection (i.e., not related to sexual interest) AND [2] lasting < 4 hours    Negative: Patient is worried they have a sexually transmitted infection (STI)    Protocols used: PENIS AND SCROTUM SYMPTOMS-A-AH

## 2022-10-10 NOTE — TELEPHONE ENCOUNTER
Read providers note. Patient understands message and verbalizes good understanding of plan of care. Patient is unable to schedule in clinic due to work schedule. Patient will go to ER if testicular pain persists.     Elaine Aguirre RN

## 2022-10-10 NOTE — TELEPHONE ENCOUNTER
I do not have availability to see patient today or tomorrow but Wednesday at 9 am I have an opening. Schedule him with me in person Wednesday at 9am, if he continues to have severe pain before then should go to the ER.    Thank you,  NAS Smith, NP-C  [facility]

## 2022-11-15 ENCOUNTER — VIRTUAL VISIT (OUTPATIENT)
Dept: FAMILY MEDICINE | Facility: CLINIC | Age: 42
End: 2022-11-15
Payer: MEDICARE

## 2022-11-15 DIAGNOSIS — N50.811 PAIN IN RIGHT TESTICLE: Primary | ICD-10-CM

## 2022-11-15 DIAGNOSIS — M25.551 HIP PAIN, RIGHT: ICD-10-CM

## 2022-11-15 DIAGNOSIS — M54.50 LUMBAR PAIN: ICD-10-CM

## 2022-11-15 DIAGNOSIS — Z12.5 SCREENING FOR PROSTATE CANCER: ICD-10-CM

## 2022-11-15 PROCEDURE — 99214 OFFICE O/P EST MOD 30 MIN: CPT | Mod: 95 | Performed by: NURSE PRACTITIONER

## 2022-11-15 RX ORDER — KETOROLAC TROMETHAMINE 10 MG/1
10 TABLET, FILM COATED ORAL 2 TIMES DAILY PRN
Qty: 20 TABLET | Refills: 0 | Status: SHIPPED | OUTPATIENT
Start: 2022-11-15 | End: 2023-05-04

## 2022-11-15 ASSESSMENT — PATIENT HEALTH QUESTIONNAIRE - PHQ9: SUM OF ALL RESPONSES TO PHQ QUESTIONS 1-9: 21

## 2022-11-15 NOTE — PATIENT INSTRUCTIONS
[facility] providers do not give referrals or medical cannabis prescriptions. It is up to the patient to look into clinics that provide that option. If a patient starts on the medical cannabis program then other medications may be discontinued such as benzodiazepines, narcotics, or potentially ADHD medication per [facility] policy.     The following web site is from the [place] Department of [facility] and can provide information about what conditions qualify and where to find clinics, along with other information. If a patient signs up for this their provider should be made aware and a copy of the certification should be scanned into the chart.     [email or web address]      [facility] suicide prevention number:  281-767-LFLP (8255)    [place] [facility]: [phone]      [place] Suicide Prevention Lifeline.  The 988 code is more than just an easy-to-remember number - it s a direct connection to compassionate, accessible care and support for anyone experiencing mental health-related distress. People can also dial 988 if they are worried about a loved one who may need crisis support. 988 is just the first step in a larger initiative to build a robust crisis response system across the country.    How to access the Lifeline:    Call [phone]  Call 988 - services available in English and Citizen of [place], interpretation services in over 150 languages  Text 988 (English only)  Chat using the Lifeline website (English only)       Key Details:    The 988 dialing code will operate through the existing Lifeline number (2-536-752-3868); the 10-digit number will not go away.  988 is confidential, free, and available 24/7/365  988 is accessible through every land line, cell phone, and voice-over internet device in the [place]  Materials in Epic referencing the 10-digit Lifeline will be updated on July 18 to include 988 information

## 2022-11-15 NOTE — PROGRESS NOTES
"Rafa is a 42 year old who is being evaluated via a billable video visit.      How would you like to obtain your AVS? MyChart  If the video visit is dropped, the invitation should be resent by: Text to cell phone: [phone]  Will anyone else be joining your video visit? No        Assessment & Plan     Pain in right testicle  Patient reports ongoing pain in the right testicle that seems to radiate up to his hip.  Initial UA appears positive but then a second 1 was done same day.  Waiting for response from patient to see the cause of this.  Other labs appear negative.  Trialing Toradol to see if it helps with the pain, advised to take with food, states the pain is waking him up in the middle of the night making it hard to sit.  Also referral to urology for further evaluation  - Adult Urology  Referral; Future  - Comprehensive metabolic panel (BMP + Alb, Alk Phos, ALT, AST, Total. Bili, TP); Future  - PSA, screen; Future  - UA with Microscopic reflex to Culture - lab collect; Future  - CBC with platelets; Future  - ketorolac (TORADOL) 10 MG tablet; Take 1 tablet (10 mg) by mouth 2 times daily as needed for moderate pain (4-6) Take with food  - CT Abdomen Pelvis w Contrast; Future    Screening for prostate cancer  As above.  PSA was normal    Lumbar pain  Having ongoing low back pain, we will look into this further once we determine the cause of the testicular pain    Hip pain, right  Ongoing and not sure if this is related to the testicle pain or not.        BMI:   Estimated body mass index is 37.64 kg/m  as calculated from the following:    Height as of 5/11/22: 1.85 m (6' 0.84\").    Weight as of 8/31/22: 128.8 kg (284 lb).     Depression Screening Follow Up    PHQ 11/15/2022   PHQ-9 Total Score 21   Q9: Thoughts of better off dead/self-harm past 2 weeks Several days   F/U: Thoughts of suicide or self-harm -   F/U: Self harm-plan -   F/U: Self-harm action -   F/U: Safety concerns -         Follow " Up  Follow Up Actions Taken  Referred patient back to mental health provider    Discussed the following ways the patient can remain in a safe environment:  remove alcohol, remove drugs, dispose of old medications  and be around others    Return in about 1 week (around 11/22/2022), or if symptoms worsen or fail to improve.    NAS Smith, NP-C  [facility] CORTNEY Craig is a 42 year old accompanied by his sef, presenting for the following health issues:  Musculoskeletal Problem      HPI   Musculoskeletal problem/pain  Onset/Duration: 1 year ago  Description  Location: Hip -   Joint Swelling: No  Redness: No  Pain: YES  Warmth: No  Intensity:  Severe when flares up around testicle, other times mild  Progression of Symptoms:  Intermittent  Accompanying signs and symptoms:   Fevers: No  Numbness/tingling/weakness: YES in right leg  History  Trauma to the area: YES  Recent illness:  No  Previous similar problem: YES  Previous evaluation:  YES- pt had ultrasound done-found mass in testicle  Precipitating or alleviating factors:  Aggravating factors include: sitting  Therapies tried and outcome: acetaminophen    Pain is mostly in the testicle. Getting more frequent and painful. Pain is waking him up. Cannot sit for long time. Today twisted wrong, and felt the pain coming right from the testicle and seemed to flare up to the hip.     Has instances that feels like electric shock going from testicle to hip to foot on right side. No fever/chills. There feels a firm area in testicle.     Right Hip is sore. Also feeling some 'cracking and popping' between hip and testicle.     Hx of lumbar issues. Hx of PT for it. Wondering about possible nerve related pain that is coming from the back but presenting from testicle first.     Is willing to try toradol for pain. The marijauna he is using is the only thing so far that is helping the pain. He is not too keen on trying narcotics.     Patient also  wondering about medical marijuana.  Gave him information to look into [facility] for appropriate providers      Review of Systems   Constitutional, HEENT, cardiovascular, pulmonary, gi and gu systems are negative, except as otherwise noted.      Objective           Vitals:  No vitals were obtained today due to virtual visit.    Physical Exam   GENERAL: Healthy, alert and no distress  EYES: Eyes grossly normal to inspection.  No discharge or erythema, or obvious scleral/conjunctival abnormalities.  RESP: No audible wheeze, cough, or visible cyanosis.  No visible retractions or increased work of breathing.    SKIN: Visible skin clear. No significant rash, abnormal pigmentation or lesions.  NEURO: Cranial nerves grossly intact.  Mentation and speech appropriate for age.  PSYCH: Mentation appears normal, affect normal/bright, judgement and insight intact, normal speech and appearance well-groomed.    No results found for any visits on 11/15/22.            Video-Visit Details    Video Start Time: 4:27 PM    Type of service:  Video Visit    Video End Time:4:55 PM    Originating Location (pt. Location): Home    Distant Location (provider location):  Off-site    Platform used for Video Visit: Alok

## 2022-11-16 ENCOUNTER — TELEPHONE (OUTPATIENT)
Dept: UROLOGY | Facility: CLINIC | Age: 42
End: 2022-11-16

## 2022-11-16 NOTE — TELEPHONE ENCOUNTER
[facility] Call Center    Phone Message    May a detailed message be left on voicemail: yes     Reason for Call: Other: Patient called back regarding appointment. Please call to set up appointment. Thanks.      Action Taken: Other: Urology    Travel Screening: Not Applicable

## 2022-11-16 NOTE — TELEPHONE ENCOUNTER
Called patient. Spoke to patient offered 11/17/22 at 9:15 am. Patient stated that it didn't work for him and his work schedule. Patient stated this week wasn't good for him and that next week Tuesday is more ideal because its his day off. Patient scheduled at 12:30 11/22/22. Patient reminded to get imaging done.     Merna Stoll LPN on 11/16/2022 at 4:31 PM

## 2022-11-16 NOTE — TELEPHONE ENCOUNTER
Opening available today 11/16/22 at 10:00am with Dr. Holman at [facility]. Attempted to reach patient by phone, but no answer. Left message with request to return call to clinic as soon as possible as we have availability to see you today at 10am at [facility].     If patient returns call, okay to add on for today at 10:00am with Dr. Holman.    Marifer Luevano RN, BSN

## 2022-11-16 NOTE — TELEPHONE ENCOUNTER
[facility] Call Center    Phone Message    May a detailed message be left on voicemail: yes     Reason for Call: Appointment Intake    Referring Provider Name: Francoise An NP  Diagnosis and/or Symptoms: Pain in right testicle    Pt has an urgent referral for dx above. Writer unable to find an appt within requested timeframe. Please review and advise pt when scheduling. Thanks    Action Taken: Other: uro    Travel Screening: Not Applicable

## 2022-11-17 ENCOUNTER — LAB (OUTPATIENT)
Dept: LAB | Facility: CLINIC | Age: 42
End: 2022-11-17
Payer: MEDICARE

## 2022-11-17 DIAGNOSIS — N50.811 PAIN IN RIGHT TESTICLE: ICD-10-CM

## 2022-11-17 LAB
ALBUMIN SERPL-MCNC: 4.3 G/DL (ref 3.4–5)
ALBUMIN UR-MCNC: NEGATIVE MG/DL
ALP SERPL-CCNC: 96 U/L (ref 40–150)
ALT SERPL W P-5'-P-CCNC: 39 U/L (ref 0–70)
ANION GAP SERPL CALCULATED.3IONS-SCNC: 5 MMOL/L (ref 3–14)
APPEARANCE UR: CLEAR
AST SERPL W P-5'-P-CCNC: 27 U/L (ref 0–45)
BILIRUB SERPL-MCNC: 0.5 MG/DL (ref 0.2–1.3)
BILIRUB UR QL STRIP: NEGATIVE
BUN SERPL-MCNC: 14 MG/DL (ref 7–30)
CALCIUM SERPL-MCNC: 9.6 MG/DL (ref 8.5–10.1)
CHLORIDE BLD-SCNC: 108 MMOL/L (ref 94–109)
CHOLEST SERPL-MCNC: 279 MG/DL
CO2 SERPL-SCNC: 27 MMOL/L (ref 20–32)
COLOR UR AUTO: COLORLESS
CREAT SERPL-MCNC: 0.75 MG/DL (ref 0.66–1.25)
ERYTHROCYTE [DISTWIDTH] IN BLOOD BY AUTOMATED COUNT: 12.4 % (ref 10–15)
FASTING STATUS PATIENT QL REPORTED: NO
GFR SERPL CREATININE-BSD FRML MDRD: >90 ML/MIN/1.73M2
GLUCOSE BLD-MCNC: 113 MG/DL (ref 70–99)
GLUCOSE UR STRIP-MCNC: NEGATIVE MG/DL
HCT VFR BLD AUTO: 41.5 % (ref 40–53)
HDLC SERPL-MCNC: 54 MG/DL
HGB BLD-MCNC: 14.6 G/DL (ref 13.3–17.7)
HGB UR QL STRIP: NEGATIVE
KETONES UR STRIP-MCNC: NEGATIVE MG/DL
LDLC SERPL CALC-MCNC: 176 MG/DL
LDLC SERPL CALC-MCNC: ABNORMAL MG/DL
LEUKOCYTE ESTERASE UR QL STRIP: NEGATIVE
MCH RBC QN AUTO: 29.6 PG (ref 26.5–33)
MCHC RBC AUTO-ENTMCNC: 35.2 G/DL (ref 31.5–36.5)
MCV RBC AUTO: 84 FL (ref 78–100)
NITRATE UR QL: NEGATIVE
NONHDLC SERPL-MCNC: 225 MG/DL
PH UR STRIP: 5.5 [PH] (ref 5–7)
PLATELET # BLD AUTO: 294 10E3/UL (ref 150–450)
POTASSIUM BLD-SCNC: 4.3 MMOL/L (ref 3.4–5.3)
PROT SERPL-MCNC: 7.7 G/DL (ref 6.8–8.8)
PSA SERPL-MCNC: 0.5 UG/L (ref 0–4)
RBC # BLD AUTO: 4.93 10E6/UL (ref 4.4–5.9)
RBC #/AREA URNS AUTO: NORMAL /HPF
SODIUM SERPL-SCNC: 140 MMOL/L (ref 133–144)
SP GR UR STRIP: 1.01 (ref 1–1.03)
TRIGL SERPL-MCNC: 443 MG/DL
UROBILINOGEN UR STRIP-MCNC: NORMAL MG/DL
WBC # BLD AUTO: 7.8 10E3/UL (ref 4–11)
WBC #/AREA URNS AUTO: NORMAL /HPF

## 2022-11-17 PROCEDURE — 85027 COMPLETE CBC AUTOMATED: CPT

## 2022-11-17 PROCEDURE — 36415 COLL VENOUS BLD VENIPUNCTURE: CPT | Mod: GA

## 2022-11-17 PROCEDURE — 81001 URINALYSIS AUTO W/SCOPE: CPT

## 2022-11-17 PROCEDURE — G0103 PSA SCREENING: HCPCS | Mod: GA

## 2022-11-17 PROCEDURE — 80053 COMPREHEN METABOLIC PANEL: CPT

## 2022-11-17 PROCEDURE — 80061 LIPID PANEL: CPT

## 2022-11-17 PROCEDURE — 83721 ASSAY OF BLOOD LIPOPROTEIN: CPT | Mod: 59

## 2022-11-17 NOTE — RESULT ENCOUNTER NOTE
Rafa,    Urinalysis was normal.    The PSA level (prostate cancer screening test) was negative/normal.     The kidney, liver and electrolyte panel was normal except for your glucose level which was elevated in the prediabetic range(100-125).  This puts you at risk for developing diabetes in the future.  Lifestyle measures will help to lower your blood glucose levels and lower the risks of diabetes. These measures include regular exercise, weight loss/maintaining a healthy body weight, and moderating/decreasing carbohydrates (sugar, bread, pasta, rice, baked goods, potatoes, etc) in your diet. We will continue to monitor your blood glucose annually, but please be seen sooner  if you develop any new signs or symptoms of diabetes (increase thirst or urination, fatigue, blurry vision, unexplained weight loss).      Finally, your cholesterol panel still shows a significantly elevated triglyceride and LDL (bad cholesterol) level.  Given how high your numbers are, I do think getting a fasting cholesterol sample could be helpful to determine if medication is needed at this point to manage these numbers.  If you are open to this, please schedule a lab only visit and fast for 10 to 12 hours prior to the lab draw.  Water and black coffee are okay.  Recommendations to reduce cholesterol and cardiovascular risks:  Diet:  -Try to eat more vegetables, fruits, legumes, nuts/seeds, whole grains, and fish.  - try to eat less red meat, processed meats, processed foods, sweetened beverages.   - try to replace saturated fat in your diet with mono- and poly-unsaturated fats   Exercise:  Aim for regular exercise with a goal of 150 min of moderate to high intensity aerobic exercise per week      please MyChart or call if you have any concerns or questions.   Sincerely,  Fidelina Singh MD  (for Francoise An NP who is out of the office today)

## 2022-11-17 NOTE — LETTER
November 30, 2022      Rafa Cochran  [address] [address]        Dear ,    We are writing to inform you of your test results.     Rafa,     Urinalysis was normal.     The PSA level (prostate cancer screening test) was negative/normal.      The kidney, liver and electrolyte panel was normal except for your glucose level which was elevated in the prediabetic range(100-125).  This puts you at risk for developing diabetes in the future.  Lifestyle measures will help to lower your blood glucose levels and lower the risks of diabetes. These measures include regular exercise, weight loss/maintaining a healthy body weight, and moderating/decreasing carbohydrates (sugar, bread, pasta, rice, baked goods, potatoes, etc) in your diet. We will continue to monitor your blood glucose annually, but please be seen sooner  if you develop any new signs or symptoms of diabetes (increase thirst or urination, fatigue, blurry vision, unexplained weight loss).       Finally, your cholesterol panel still shows a significantly elevated triglyceride and LDL (bad cholesterol) level.  Given how high your numbers are, I do think getting a fasting cholesterol sample could be helpful to determine if medication is needed at this point to manage these numbers.  If you are open to this, please schedule a lab only visit and fast for 10 to 12 hours prior to the lab draw.  Water and black coffee are okay.  Recommendations to reduce cholesterol and cardiovascular risks:  Diet:  -Try to eat more vegetables, fruits, legumes, nuts/seeds, whole grains, and fish.  - try to eat less red meat, processed meats, processed foods, sweetened beverages.   - try to replace saturated fat in your diet with mono- and poly-unsaturated fats   Exercise:  Aim for regular exercise with a goal of 150 min of moderate to high intensity aerobic exercise per week       please MyChart or call if you have any concerns or questions.    Sincerely,  Fidelina Singh MD  (for Francoise An NP who is out of the office today)     Resulted Orders   Lipid panel reflex to direct LDL Non-fasting   Result Value Ref Range    Cholesterol 279 (H) <200 mg/dL    Triglycerides 443 (H) <150 mg/dL    Direct Measure HDL 54 >=40 mg/dL    LDL Cholesterol Calculated        Comment:      Cannot estimate LDL when triglyceride exceeds 400 mg/dL    Non HDL Cholesterol 225 (H) <130 mg/dL    Patient Fasting > 8hrs? No     Narrative    Cholesterol  Desirable:  <200 mg/dL    Triglycerides  Normal:  Less than 150 mg/dL  Borderline High:  150-199 mg/dL  High:  200-499 mg/dL  Very High:  Greater than or equal to 500 mg/dL    Direct Measure HDL  Female:  Greater than or equal to 50 mg/dL   Male:  Greater than or equal to 40 mg/dL    LDL Cholesterol  Desirable:  <100mg/dL  Above Desirable:  100-129 mg/dL   Borderline High:  130-159 mg/dL   High:  160-189 mg/dL   Very High:  >= 190 mg/dL    Non HDL Cholesterol  Desirable:  130 mg/dL  Above Desirable:  130-159 mg/dL  Borderline High:  160-189 mg/dL  High:  190-219 mg/dL  Very High:  Greater than or equal to 220 mg/dL   Comprehensive metabolic panel (BMP + Alb, Alk Phos, ALT, AST, Total. Bili, TP)   Result Value Ref Range    Sodium 140 133 - 144 mmol/L    Potassium 4.3 3.4 - 5.3 mmol/L      Comment:      Specimen slightly hemolyzed, potassium may be falsely elevated.    Chloride 108 94 - 109 mmol/L    Carbon Dioxide (CO2) 27 20 - 32 mmol/L    Anion Gap 5 3 - 14 mmol/L    Urea Nitrogen 14 7 - 30 mg/dL    Creatinine 0.75 0.66 - 1.25 mg/dL    Calcium 9.6 8.5 - 10.1 mg/dL    Glucose 113 (H) 70 - 99 mg/dL    Alkaline Phosphatase 96 40 - 150 U/L    AST 27 0 - 45 U/L      Comment:      Specimen is hemolyzed which can falsely elevate AST. Analysis of a non-hemolyzed specimen may result in a lower value.    ALT 39 0 - 70 U/L    Protein Total 7.7 6.8 - 8.8 g/dL    Albumin 4.3 3.4 - 5.0 g/dL    Bilirubin Total 0.5 0.2 - 1.3 mg/dL    GFR  Estimate >90 >60 mL/min/1.73m2      Comment:      Effective December 21, 2021 eGFRcr in adults is calculated using the 2021 CKD-EPI creatinine equation which includes age and gender (Ruma et al., NE, DOI: 10.1056/MJJOzu9357031)   UA with Microscopic reflex to Culture - lab collect   Result Value Ref Range    Color Urine Colorless Colorless, Straw, Light Yellow, Yellow    Appearance Urine Clear Clear    Glucose Urine Negative Negative mg/dL    Bilirubin Urine Negative Negative    Ketones Urine Negative Negative mg/dL    Specific Gravity Urine 1.008 0.999 - 1.035    Blood Urine Negative Negative    pH Urine 5.5 5.0 - 7.0    Protein Albumin Urine Negative Negative mg/dL    Nitrite Urine Negative Negative    Leukocyte Esterase Urine Negative Negative    Urobilinogen Urine Normal Normal, 2.0 mg/dL   CBC with platelets   Result Value Ref Range    WBC Count 7.8 4.0 - 11.0 10e3/uL    RBC Count 4.93 4.40 - 5.90 10e6/uL    Hemoglobin 14.6 13.3 - 17.7 g/dL    Hematocrit 41.5 40.0 - 53.0 %    MCV 84 78 - 100 fL    MCH 29.6 26.5 - 33.0 pg    MCHC 35.2 31.5 - 36.5 g/dL    RDW 12.4 10.0 - 15.0 %    Platelet Count 294 150 - 450 10e3/uL   PSA, screen   Result Value Ref Range    Prostate Specific Antigen Screen 0.50 0.00 - 4.00 ug/L       If you have any questions or concerns, please call the clinic at the number listed above.       Sincerely,      Francoise An NP

## 2022-11-22 ENCOUNTER — ANCILLARY PROCEDURE (OUTPATIENT)
Dept: CT IMAGING | Facility: CLINIC | Age: 42
End: 2022-11-22
Attending: NURSE PRACTITIONER
Payer: MEDICARE

## 2022-11-22 ENCOUNTER — OFFICE VISIT (OUTPATIENT)
Dept: UROLOGY | Facility: CLINIC | Age: 42
End: 2022-11-22
Payer: MEDICARE

## 2022-11-22 DIAGNOSIS — N50.811 PAIN IN RIGHT TESTICLE: ICD-10-CM

## 2022-11-22 DIAGNOSIS — N50.811 RIGHT TESTICULAR PAIN: Primary | ICD-10-CM

## 2022-11-22 PROCEDURE — 99203 OFFICE O/P NEW LOW 30 MIN: CPT | Performed by: UROLOGY

## 2022-11-22 PROCEDURE — 74176 CT ABD & PELVIS W/O CONTRAST: CPT | Mod: MG | Performed by: RADIOLOGY

## 2022-11-22 PROCEDURE — G1010 CDSM STANSON: HCPCS | Mod: GC | Performed by: RADIOLOGY

## 2022-11-22 RX ORDER — IOPAMIDOL 755 MG/ML
135 INJECTION, SOLUTION INTRAVASCULAR ONCE
Status: DISCONTINUED | OUTPATIENT
Start: 2022-11-22 | End: 2022-11-22

## 2022-11-22 NOTE — PROGRESS NOTES
"Urology Consult History and Physical  [facility]   Name: Rafa Cochran    MRN: [id number]   YOB: 1980       We were asked to see Rafa Cochran at the request of Francoise An CNP for evaluation and treatment of Right testicular pain.        Chief Complaint:   Right testicular pain    History is obtained from the patient            History of Present Illness:   Rafa Cochran is a 42 year old male who is being seen for evaluation of right testicular pain    Has been having intermittent pain in the right testicle for the past year  Pain will \"ramp up and last for about 1 minute\"  This would feel \"like a needle getting shoved in\"  Pain is worse with prolonged sitting  This will come and go in waves - minimal pain last week, but this week 3-4x/day  No notable swelling  The right testicle feels more firm and smaller  .  Will resolve on its own and he does not require pain medication for this           Past Medical History:     Past Medical History:   Diagnosis Date     Depressive disorder 1991     H/O esophageal ulcer 2013     Nephrolithiasis 2009     Panic attack 07/05/2013     Shoulder pain, left 10/19/2015     Sprain and strain of shoulder and upper arm, left, initial encounter 10/19/2015            Past Surgical History:     Past Surgical History:   Procedure Laterality Date     APPENDECTOMY       COMBINED ESOPHAGOSCOPY, GASTROSCOPY, DUODENOSCOPY (EGD) WITH CO2 INSUFFLATION N/A 11/25/2019    Procedure: ESOPHAGOGASTRODUODENOSCOPY, WITH CO2 INSUFFLATION;  Surgeon: Sabas Grajeda MD;  Location:  OR     ESOPHAGOSCOPY, GASTROSCOPY, DUODENOSCOPY (EGD), COMBINED N/A 11/25/2019    Procedure: Esophagogastroduodenoscopy, With Biopsy;  Surgeon: Sabas Grajeda MD;  Location:  OR     GENITOURINARY SURGERY  1988            Social History:     Social History     Tobacco Use     Smoking status: Former     Years: 18.00     Types: Cigarettes     Quit date: 12/25/2013     Years since quitting: " 8.9     Smokeless tobacco: Never     Tobacco comments:     5 cigs per day   Substance Use Topics     Alcohol use: Yes     Comment: extremely rare       History   Smoking Status     Former     Years: 18.00     Types: Cigarettes     Quit date: 12/25/2013   Smokeless Tobacco     Never            Family History:     Family History   Problem Relation Age of Onset     C.A.D. Maternal Grandfather      Diabetes Maternal Grandfather      Hypertension Maternal Grandfather      Heart Disease Maternal Grandfather      Coronary Artery Disease Maternal Grandfather      Cerebrovascular Disease Maternal Grandfather      C.A.D. Paternal Grandmother      Diabetes Paternal Grandmother      Circulatory Paternal Grandmother      Gastrointestinal Disease Paternal Grandmother      Hypertension Paternal Grandfather      Circulatory Paternal Grandfather      Heart Disease Paternal Grandfather      Gastrointestinal Disease Paternal Grandfather      Heart Failure Paternal Grandfather      Coronary Artery Disease Paternal Grandfather      Hypertension Father      Alcohol/Drug Father      Allergies Father      Depression Father      Gastrointestinal Disease Father      Prostate Cancer Father      Esophageal Cancer Father      Mental Illness Father      Substance Abuse Father      Cerebrovascular Disease Maternal Grandmother      Arthritis Maternal Grandmother      Heart Disease Maternal Grandmother      Alcohol/Drug Mother      Allergies Mother      Circulatory Mother      Depression Mother      Heart Disease Mother      Diabetes Mother      Cerebrovascular Disease Mother         occurred during open heart surgery     Anxiety Disorder Mother      Mental Illness Mother      Asthma Mother      Alcohol/Drug Sister      Allergies Sister      Depression Sister      Genitourinary Problems Sister      Hypertension Sister      Depression Sister      Anxiety Disorder Sister      Mental Illness Sister      Obesity Sister      Depression Sister       Anxiety Disorder Sister      Mental Illness Sister      Depression Brother      Anxiety Disorder Brother      Mental Illness Brother      Asthma Brother               Allergies:     Allergies   Allergen Reactions     Nicotine Rash     Nicotine Patches     Pertussis Toxoid      Bad reaction as a child            Medications:     Current Outpatient Medications   Medication Sig     gabapentin (NEURONTIN) 100 MG capsule Take 3 capsules (300 mg) by mouth 3 times daily And an additional 200 mg prn     LORazepam (ATIVAN) 2 MG tablet Take 1 tablet (2 mg) by mouth 3 times daily as needed for anxiety Patient taking 3 mg in the morning, 2 mg in the afternoon and 2 mg in the evening.     methocarbamol (ROBAXIN) 750 MG tablet Take 1 tablet (750 mg) by mouth 3 times daily as needed for muscle spasms     mirtazapine (REMERON) 15 MG tablet 15 mg Patient taking 1.5 tablets at bedtime     omeprazole (PRILOSEC) 20 MG DR capsule Take 1 capsule (20 mg) by mouth daily     order for DME autoCPAP 8-12cm     ketorolac (TORADOL) 10 MG tablet Take 1 tablet (10 mg) by mouth 2 times daily as needed for moderate pain (4-6) Take with food (Patient not taking: Reported on 11/22/2022)     No current facility-administered medications for this visit.             Review of Systems:     Skin: negative  Eyes: negative  Ears/Nose/Throat: negative  Respiratory: No shortness of breath, dyspnea on exertion, cough, or hemoptysis  Cardiovascular: negative  Gastrointestinal: negative  Genitourinary: as above  Musculoskeletal: negative  Neurologic: negative  Psychiatric: negative  Hematologic/Lymphatic/Immunologic: negative  Endocrine: negative          Physical Exam:   No data found.  There is no height or weight on file to calculate BMI.     General: age-appropriate appearing male in NAD  HEENT: Head AT/NC, EOMI, CN Grossly intact  Lungs: no respiratory distress, or pursed lip breathing  Heart: No obvious jugular venous distension present  Back: no bony  midline tenderness, no CVAT bilaterally.  Abdomen: soft, obesely-distended, non-tender. No organomegaly  : Normal phallus, normal testes bilaterally with no evidence of swelling or point tenderness or any other abnormalities, normal epididymis bilaterally  Lymph: no palpable inguinal lymphadenopathy.  LE: no edema.   Musculoskeltal: extremities normal, no peripheral edema  Skin: no suspicious lesions or rashes  Neuro: Alert, oriented, speech and mentation normal;  moving all 4 extremities equally.  Psych: affect and mood normal          Data:   All laboratory data reviewed:    UA RESULTS:  Recent Labs   Lab Test 11/17/22  1311 11/17/22  0900 05/11/22  1344   COLOR Colorless   < > Yellow   APPEARANCE Clear   < > Clear   URINEGLC Negative   < > Negative   URINEBILI Negative   < > Negative   URINEKETONE Negative   < > Negative   SG 1.008   < > 1.010   UBLD Negative   < > Negative   URINEPH 5.5   < > 6.0   PROTEIN Negative   < > Negative   UROBILINOGEN  --   --  0.2   NITRITE Negative   < > Negative   LEUKEST Negative   < > Negative   RBCU None Seen   < > 0-2   WBCU None Seen   < > 0-5    < > = values in this interval not displayed.      PSA   Date Value Ref Range Status   03/12/2019 0.66 0 - 4 ug/L Final     Comment:     Assay Method:  Chemiluminescence using Siemens Vista analyzer     Prostate Specific Antigen Screen   Date Value Ref Range Status   11/17/2022 0.50 0.00 - 4.00 ug/L Final      Lab Results   Component Value Date    CR 0.75 11/17/2022    CR 0.88 09/24/2020      IMAGING:  All pertinent imaging reviewed:    All imaging studies reviewed by me.  I personally reviewed these imaging films.  A formal report from radiology will follow.    Findings:  The testes demonstrate normal and symmetric echotexture and  vascularity. The right testicle measures 4.9 x 4.0 x 3.1 cm and the  left measures 5.0 x 3.6 x 3.4  cm. Small nonshadowing hyperechoic  focus within the left testicle measures 3 x 2 x 3 mm, unchanged.  There  is no evidence of torsion.     There is no evidence of a significant hydrocele or varicocele.     Both the right and left epididymis are within normal limits.                                                                      Impression:   No significant change in nonspecific benign-appearing 3 mm focus of  increased echogenicity in the left testicle.         Impression and Plan:   Impression:   42-year-old man with intermittent right testicular pain      Plan:   Right testicular pain  - I reviewed his labs which are notable for a normal urinalysis and a nonconcerning PSA as well as a normal serum creatinine  - I reviewed his imaging and reviewed these images personally.  I agree with radiologist interpretation there is no evidence of significant testicular abnormalities  - We discussed the impact of stress and the numerous ways that this can manifest  - We discussed the use of scrotal support and intermittent rest and ice  - If this persists we discussed the option for referral to my partner Dr. Holman for consideration for a cord block     Thank you for the kind consultation.    Time spent: 20 minutes spent on the date of the encounter doing chart review, history and exam, documentation and further activities as noted above.    Deniz Alberto MD   Urology  [facility]  [facility] Phone: [phone]  [facility] Phone: [phone]

## 2022-11-22 NOTE — NURSING NOTE
Rafa Cochran's goals for this visit include:   Chief Complaint   Patient presents with     New Patient     Urgent referral pain in right testicle        He requests these members of his care team be copied on today's visit information:       PCP: Francoise An    Referring Provider:  No referring provider defined for this encounter.    There were no vitals taken for this visit.    Do you need any medication refills at today's visit?     Merna Stoll LPN on 11/22/2022 at 12:43 PM

## 2022-11-23 NOTE — RESULT ENCOUNTER NOTE
Alvarado Craig,  The CT of your abdomen and pelvis did not show a reason for the testicular pain that you are having that sometimes radiates up into your groin.  I recommend following up with urology.    Other findings though include 1 small pulmonary nodule, I do not recall if you have smoked a lot or not in the past, if not then we do not need to follow-up on this.  Please keep me posted.     Therapeutic there is to be a small cyst that may have burst near your kidneys.  But nothing concerning.    There is no inguinal hernia.    Please let me know if you have questions.  Thank you,  NAS Smith, NP-C  [facility]

## 2022-11-30 ENCOUNTER — TELEPHONE (OUTPATIENT)
Dept: FAMILY MEDICINE | Facility: CLINIC | Age: 42
End: 2022-11-30

## 2023-01-06 DIAGNOSIS — M62.838 MUSCLE SPASM: ICD-10-CM

## 2023-01-09 RX ORDER — METHOCARBAMOL 750 MG/1
750 TABLET, FILM COATED ORAL 3 TIMES DAILY PRN
Qty: 90 TABLET | Refills: 2 | Status: SHIPPED | OUTPATIENT
Start: 2023-01-09 | End: 2023-05-30

## 2023-01-14 ENCOUNTER — HEALTH MAINTENANCE LETTER (OUTPATIENT)
Age: 43
End: 2023-01-14

## 2023-05-02 DIAGNOSIS — M62.838 MUSCLE SPASM: ICD-10-CM

## 2023-05-02 RX ORDER — METHOCARBAMOL 750 MG/1
750 TABLET, FILM COATED ORAL 3 TIMES DAILY PRN
Qty: 90 TABLET | Refills: 2 | Status: CANCELLED | OUTPATIENT
Start: 2023-05-02

## 2023-05-03 ENCOUNTER — LAB (OUTPATIENT)
Dept: FAMILY MEDICINE | Facility: CLINIC | Age: 43
End: 2023-05-03
Attending: FAMILY MEDICINE
Payer: MEDICARE

## 2023-05-03 DIAGNOSIS — Z20.822 SUSPECTED 2019 NOVEL CORONAVIRUS INFECTION: ICD-10-CM

## 2023-05-03 PROCEDURE — U0003 INFECTIOUS AGENT DETECTION BY NUCLEIC ACID (DNA OR RNA); SEVERE ACUTE RESPIRATORY SYNDROME CORONAVIRUS 2 (SARS-COV-2) (CORONAVIRUS DISEASE [COVID-19]), AMPLIFIED PROBE TECHNIQUE, MAKING USE OF HIGH THROUGHPUT TECHNOLOGIES AS DESCRIBED BY CMS-2020-01-R: HCPCS

## 2023-05-03 PROCEDURE — 99207 PR NO CHARGE LOS: CPT

## 2023-05-03 PROCEDURE — U0005 INFEC AGEN DETEC AMPLI PROBE: HCPCS

## 2023-05-04 ENCOUNTER — NURSE TRIAGE (OUTPATIENT)
Dept: FAMILY MEDICINE | Facility: CLINIC | Age: 43
End: 2023-05-04
Payer: MEDICARE

## 2023-05-04 ENCOUNTER — ANCILLARY PROCEDURE (OUTPATIENT)
Dept: GENERAL RADIOLOGY | Facility: CLINIC | Age: 43
End: 2023-05-04
Attending: EMERGENCY MEDICINE
Payer: MEDICARE

## 2023-05-04 ENCOUNTER — OFFICE VISIT (OUTPATIENT)
Dept: URGENT CARE | Facility: URGENT CARE | Age: 43
End: 2023-05-04
Payer: MEDICARE

## 2023-05-04 VITALS
BODY MASS INDEX: 39.12 KG/M2 | WEIGHT: 295.2 LBS | SYSTOLIC BLOOD PRESSURE: 144 MMHG | OXYGEN SATURATION: 98 % | HEART RATE: 98 BPM | DIASTOLIC BLOOD PRESSURE: 86 MMHG | TEMPERATURE: 98.4 F

## 2023-05-04 DIAGNOSIS — J01.90 ACUTE SINUSITIS WITH SYMPTOMS > 10 DAYS: Primary | ICD-10-CM

## 2023-05-04 DIAGNOSIS — J06.9 UPPER RESPIRATORY TRACT INFECTION, UNSPECIFIED TYPE: ICD-10-CM

## 2023-05-04 DIAGNOSIS — R07.9 CHEST PAIN, UNSPECIFIED TYPE: ICD-10-CM

## 2023-05-04 PROCEDURE — 87651 STREP A DNA AMP PROBE: CPT | Performed by: EMERGENCY MEDICINE

## 2023-05-04 PROCEDURE — 99214 OFFICE O/P EST MOD 30 MIN: CPT | Performed by: EMERGENCY MEDICINE

## 2023-05-04 PROCEDURE — 71046 X-RAY EXAM CHEST 2 VIEWS: CPT | Mod: TC | Performed by: RADIOLOGY

## 2023-05-04 PROCEDURE — 93000 ELECTROCARDIOGRAM COMPLETE: CPT | Performed by: EMERGENCY MEDICINE

## 2023-05-04 PROCEDURE — 87804 INFLUENZA ASSAY W/OPTIC: CPT | Performed by: EMERGENCY MEDICINE

## 2023-05-04 RX ORDER — BENZONATATE 100 MG/1
100 CAPSULE ORAL 2 TIMES DAILY PRN
Qty: 20 CAPSULE | Refills: 0 | Status: SHIPPED | OUTPATIENT
Start: 2023-05-04 | End: 2023-05-30

## 2023-05-04 NOTE — PROGRESS NOTES
Assessment & Plan     Diagnosis:  (J01.90) Acute sinusitis with symptoms > 10 days  (primary encounter diagnosis)  Plan: amoxicillin-clavulanate         (AUGMENTIN) 875-125 MG tablet    (R07.9) Chest pain, unspecified type    (J06.9) Upper respiratory tract infection, unspecified type  Plan:  benzonatate (TESSALON) 100 MG         capsule      Medical Decision Making:  Rafa Cochran is a 43 year old male who presents to clinic today for evaluation of facial pain/pressure, cough, chest tightness and shortness of breath.  Signs and symptoms are consistent with sinusitis and flu-like illness. He did have chest pain and given risk factors we did obtain an EKG; this demonstrates no signs of acute ischemic process.  Chest x-ray without evidence of acute effusion, infiltrate or pneumothorax on my read, radiology notes as per below.  He also notes that chest pain resolved after he was burping the other day, it is not exertional or pleuritic and I suspect more likely a gastritis issue.  Discussed viral vs bacterial sinusitis/bronchitis with the patient. Given duration >10 days and negative viral testing here; I discussed antibiotics for bacterial sinusitis.   outpatient medications ordered as noted above.  No evidence of fungal sinusitis, meningitis, encephalitis, cavernous sinus thrombosis, ocular pathology, intracerebral bleed, serious bacterial infection otherwise.  He had benign abdominal exam.  Very low suspicion for ACS based on symptom constellation and reassuring EKG here.  Low concern for PE, patient is PERC negative.  Supportive outpatient management indicated.  Patient verbalizes understanding and agreement with the plan including reasons to go to the ER. All questions answered.      Abe Lockwood PA-C  [facility]    Subjective     Rafa Cochran is a 43 year old male who presents to clinic today for the following health issues:  Chief Complaint   Patient presents with     Cough     Chest cold,  "congestion. Onset- 3 days        HPI  Patient states for the last 2 weeks has been experiencing sinus pressure and pain, runny nose, sinus swelling, difficulties using his CPAP at night for this reason.  He notes that last 3 days his symptoms have worsened, he is feeling chills, body aches, coughing frequently especially when laying down.  He is also notes some diarrhea as well, no abdominal pain.  He has had some chest pains as well, seems to radiate into both arms and his jaw at times.  He states it \"feels like gas,\" because after he burps he gets relief.  He denies exertional chest pain, shortness of breath.  He also denies any fever, leg swelling, recent surgeries or long periods of immobilization, dark or bloody stools, severe headaches, vision changes, dizziness or lightheadedness.      Review of Systems    See HPI    Objective      Vitals: BP (!) 144/86 (BP Location: Left arm, Patient Position: Sitting, Cuff Size: Adult Large)   Pulse 98   Temp 98.4  F (36.9  C) (Tympanic)   Wt 133.9 kg (295 lb 3.2 oz)   SpO2 98%   BMI 39.12 kg/m    Resp: 18    Patient Vitals for the past 24 hrs:   BP Temp Temp src Pulse SpO2 Weight   05/04/23 1401 (!) 144/86 98.4  F (36.9  C) Tympanic 98 98 % 133.9 kg (295 lb 3.2 oz)       Vital signs reviewed by: Abe Lockwood PA-C    Physical Exam   Constitutional: Patient is alert and cooperative. Mild acute distress.  Ears: Right TM is normal. Left TM is normal. External ear canals are normal.  Mouth: Mucous membranes are moist. Normal tongue and tonsil. Posterior oropharynx is clear.  Nose: Nasal turbinates are edematous.  Yellow rhinorrhea noted.  No septal mass or epistaxis.  Eyes: Conjunctivae, EOMI and lids are normal. PERRL.  Cardiovascular: Regular rate and rhythm  Pulmonary/Chest: Lungs are clear to auscultation throughout. Effort normal. No respiratory distress. No wheezes, rales or rhonchi.  GI: Abdomen is soft and non-tender throughout.   Neurological: Alert and " oriented x3. CN 3-7 and 9-12 intact. Strength and sensation are intact and symmetric in the bilateral upper extremities.   Skin: No rash noted on visualized skin.  Psychiatric:The patient has a normal mood and affect.     Labs/Imaging:  Results for orders placed or performed in visit on 05/04/23   XR Chest 2 Views     Status: None (Preliminary result)    Narrative    CHEST TWO VIEWS    5/4/2023 2:38 PM     HISTORY: Chest pain, unspecified type. Upper respiratory tract  infection, unspecified type.    COMPARISON: 5/19/2016.      Impression    IMPRESSION: No acute cardiopulmonary disease.   Results for orders placed or performed in visit on 05/04/23   Streptococcus A Rapid Screen w/Reflex to PCR - Clinic Collect     Status: Normal    Specimen: Throat; Swab   Result Value Ref Range    Group A Strep antigen Negative Negative   Influenza A & B Antigen - Clinic Collect     Status: Normal    Specimen: Nose; Swab   Result Value Ref Range    Influenza A antigen Negative Negative    Influenza B antigen Negative Negative    Narrative    Test results must be correlated with clinical data. If necessary, results should be confirmed by a molecular assay or viral culture.          EKG - Reviewed and interpreted by: Abe Lockwood PA-C  Rate: 98 bpm   OR: 178 ms  QTc: 396 ms  Normal sinus rhythm. normal intervals, no acute ST/T changes c/w ischemia, no LVH by voltage criteria, grossly unchanged from previous tracings.      Abe Lockwood PA-C, May 4, 2023

## 2023-05-04 NOTE — TELEPHONE ENCOUNTER
"Pt calling to report productive cough started 2 days ago.     Pt tested negative for COVID. Endorses frequent productive cough, mild SOB with activity, runny nose, ears feel stuffed, mild dizziness. Pt states he had cough a week ago and was coughing up yellow phlegm at the time, he is unsure at this time what color sputum is. Pt denies coughing up blood. Denies cardiac history, states he was told recently that he has \"mild asthma.\"    Per protocol, pt to bee seen by health care provider within 4 hours. RN instructed pt to be seen in , pt verbalized understanding and will go to  urgent care.     Amairani Acosta RN    Reason for Disposition    [1] MILD difficulty breathing (e.g., minimal/no SOB at rest, SOB with walking, pulse <100) AND [2] still present when not coughing    Additional Information    Negative: SEVERE difficulty breathing (e.g., struggling for each breath, speaks in single words)    Negative: Bluish (or gray) lips or face now    Negative: [1] Difficulty breathing AND [2] exposure to flames, smoke, or fumes    Negative: [1] Stridor AND [2] difficulty breathing    Negative: Sounds like a life-threatening emergency to the triager    Negative: [1] Previous asthma attacks AND [2] this feels like asthma attack    Negative: Dry cough (non-productive;  no sputum or minimal clear sputum)    Negative: [1] MODERATE difficulty breathing (e.g., speaks in phrases, SOB even at rest, pulse 100-120) AND [2] still present when not coughing    Negative: Chest pain  (Exception: MILD central chest pain, present only when coughing)    Negative: Patient sounds very sick or weak to the triager    Answer Assessment - Initial Assessment Questions  1. ONSET: \"When did the cough begin?\"       5/2/23  2. SEVERITY: \"How bad is the cough today?\"       Moderate in frequency, but feels like a deep cough  3. SPUTUM: \"Describe the color of your sputum\" (none, dry cough; clear, white, yellow, green)      Productive, not sure of " "color  4. HEMOPTYSIS: \"Are you coughing up any blood?\" If so ask: \"How much?\" (flecks, streaks, tablespoons, etc.)      None  5. DIFFICULTY BREATHING: \"Are you having difficulty breathing?\" If Yes, ask: \"How bad is it?\" (e.g., mild, moderate, severe)     - MILD: No SOB at rest, mild SOB with walking, speaks normally in sentences, can lie down, no retractions, pulse < 100.     - MODERATE: SOB at rest, SOB with minimal exertion and prefers to sit, cannot lie down flat, speaks in phrases, mild retractions, audible wheezing, pulse 100-120.     - SEVERE: Very SOB at rest, speaks in single words, struggling to breathe, sitting hunched forward, retractions, pulse > 120       Mild, speaking in full sentences.   6. FEVER: \"Do you have a fever?\" If Yes, ask: \"What is your temperature, how was it measured, and when did it start?\"      No  7. CARDIAC HISTORY: \"Do you have any history of heart disease?\" (e.g., heart attack, congestive heart failure)       No  8. LUNG HISTORY: \"Do you have any history of lung disease?\"  (e.g., pulmonary embolus, asthma, emphysema)      Mild asthma  9. PE RISK FACTORS: \"Do you have a history of blood clots?\" (or: recent major surgery, recent prolonged travel, bedridden)      No  10. OTHER SYMPTOMS: \"Do you have any other symptoms?\" (e.g., runny nose, wheezing, chest pain)        Runny nose, ears plugged, dizzy.   11. PREGNANCY: \"Is there any chance you are pregnant?\" \"When was your last menstrual period?\"        no  12. TRAVEL: \"Have you traveled out of the country in the last month?\" (e.g., travel history, exposures)        No    Protocols used: COUGH - ACUTE [id number]-JS      "

## 2023-05-04 NOTE — PATIENT INSTRUCTIONS
Follow up in 2-3 days with any available provider for recheck if not improving. Go to the ER if worsening breathing, chest pains, difficulties swallowing or other concerns.

## 2023-05-10 NOTE — TELEPHONE ENCOUNTER
AudioName message sent to patient.     Thanks,  KATIA Soto  [facility]

## 2023-05-30 ENCOUNTER — VIRTUAL VISIT (OUTPATIENT)
Dept: FAMILY MEDICINE | Facility: CLINIC | Age: 43
End: 2023-05-30
Payer: MEDICARE

## 2023-05-30 DIAGNOSIS — M62.838 MUSCLE SPASM: ICD-10-CM

## 2023-05-30 DIAGNOSIS — R13.19 ESOPHAGEAL DYSPHAGIA: ICD-10-CM

## 2023-05-30 PROCEDURE — 99213 OFFICE O/P EST LOW 20 MIN: CPT | Mod: VID | Performed by: FAMILY MEDICINE

## 2023-05-30 RX ORDER — METHOCARBAMOL 750 MG/1
750 TABLET, FILM COATED ORAL 2 TIMES DAILY PRN
Qty: 180 TABLET | Refills: 1 | Status: SHIPPED | OUTPATIENT
Start: 2023-05-30 | End: 2023-10-04

## 2023-05-30 RX ORDER — GABAPENTIN 300 MG/1
300 CAPSULE ORAL 3 TIMES DAILY
COMMUNITY
Start: 2023-05-22

## 2023-05-30 NOTE — PROGRESS NOTES
Rafa is a 43 year old who is being evaluated via a billable video visit.      How would you like to obtain your AVS? MyChart  If the video visit is dropped, the invitation should be resent by: Text to cell phone: [phone]  Will anyone else be joining your video visit? No    Assessment & Plan   Muscle spasm  Persistent problem in the back neck sides and medications helpful and he finds himself taking 1 pill twice daily.  New prescription sent.  Recommended follow-up within 6 months.  - methocarbamol (ROBAXIN) 750 MG tablet  Dispense: 180 tablet; Refill: 1    Esophageal dysphagia  He feels this is stress related.  Is working with psychiatrist.  Does not drink caffeine or alcohol on a regular basis.  Recommended continuing medication: Last EGD in 2019  - omeprazole (PRILOSEC) 20 MG DR capsule  Dispense: 90 capsule; Refill: 3        No follow-ups on file.   Follow-up Visit   Expected date:  Nov 30, 2023 (Approximate)      Follow Up Appointment Details:     Follow-up with whom?: Any Primary Taking New Patients    Follow-Up for what?: Adult Preventive    How?: In Person    Is this an as-needed follow-up?: No                        Cresencio Escamilla MD      [address] [address]  "[employer]   Office: [phone]       Subjective   Rafa is a 43 year old, presenting for the following health issues:  Recheck Medication and Refill Request        5/30/2023     5:18 PM   Additional Questions   Roomed by Shahid MCNAIR CMA     HPI     Medication Followup for: Muscle Spasms in the back, neck, chest, sides - worse with stress   -  methocarbamol (ROBAXIN) 750 MG tablet    Taking Medication as prescribed: Yes    Side Effects:  None    Medication Helping Symptoms:  Yes    Medication Followup for: Esophageal dysphagia - omeprazole (PRILOSEC) 20 MG DR capsule    Taking Medication as prescribed: Yes    Side Effects:  None    Medication Helping Symptoms:  Yes      Review of  Systems         Objective         Vitals:  No vitals were obtained today due to virtual visit.    Physical Exam   GENERAL: Healthy, alert and no distress  EYES: Eyes grossly normal to inspection.  No discharge or erythema, or obvious scleral/conjunctival abnormalities.  RESP: No audible wheeze, cough, or visible cyanosis.  No visible retractions or increased work of breathing.    SKIN: Visible skin clear. No significant rash, abnormal pigmentation or lesions.  NEURO: Cranial nerves grossly intact.  Mentation and speech appropriate for age.  PSYCH: Mentation appears normal, affect normal/bright, judgement and insight intact, normal speech and appearance well-groomed.          Video-Visit Details    Type of service:  Video Visit   Video Start Time: 6:07 PM  Video End Time:6:16 PM    Originating Location (pt. Location): Home  Distant Location (provider location):  On-site  Platform used for Video Visit: Alok

## 2023-06-02 ENCOUNTER — HEALTH MAINTENANCE LETTER (OUTPATIENT)
Age: 43
End: 2023-06-02

## 2023-06-27 ENCOUNTER — TELEPHONE (OUTPATIENT)
Dept: FAMILY MEDICINE | Facility: CLINIC | Age: 43
End: 2023-06-27
Payer: MEDICARE

## 2023-06-27 NOTE — TELEPHONE ENCOUNTER
Patient Quality Outreach    Patient is due for the following:   Physical Annual Wellness Visit      Topic Date Due     COVID-19 Vaccine (3 - Booster for Morteza series) 03/03/2022       Next Steps:   Schedule a Annual Wellness Visit    Type of outreach:    Sent [employer] message.      Questions for provider review:    None           Anthony Velásquez

## 2023-07-03 DIAGNOSIS — R13.19 ESOPHAGEAL DYSPHAGIA: ICD-10-CM

## 2023-09-18 ENCOUNTER — NURSE TRIAGE (OUTPATIENT)
Dept: FAMILY MEDICINE | Facility: CLINIC | Age: 43
End: 2023-09-18
Payer: MEDICARE

## 2023-09-18 NOTE — TELEPHONE ENCOUNTER
Nurse Triage SBAR    Is this a 2nd Level Triage? YES, LICENSED PRACTITIONER REVIEW IS REQUIRED    Situation: Patient reports he has had a very rapid heart rate, in the 140's since this morning. He has had a bad cough that started yesterday as well and fevers up to 104. He has not tested for Covid and is unsure of any ill contacts. He is taking Tylenol every 6 hours and has gotten his fever down to 102. He denies SOB, He has increased fluids. He reports feeling light headed and very clammy.     Background: patient has a hx of PACs    Assessment: Patient needs to be seen in ED due to increase HR of 140 for the past several hours    Protocol Recommended Disposition:       Recommendation: Patient will go to ED now     Patient is going to ED    Does the patient meet one of the following criteria for ADS visit consideration? No      CHIKA Recinos, RN      Reason for Disposition   Fever > 103 F (39.4 C)   Heart beating very rapidly (e.g., > 140 / minute) and not present now  (Exception: During exercise.)    Additional Information   Negative: Difficult to awaken or acting confused (e.g., disoriented, slurred speech)   Negative: Pale cold skin and very weak (can't stand)   Negative: Difficulty breathing and bluish (or gray) lips or face   Negative: New-onset rash with purple (or blood-colored) spots or dots   Negative: Sounds like a life-threatening emergency to the triager   Negative: Fever onset within 24 hours of receiving vaccine   Negative: Fever within 14 days of COVID-19 Exposure   Negative: Pregnant   Negative: Postpartum (from 0 to 6 weeks after delivery)   Negative: Headache and stiff neck (can't touch chin to chest)   Negative: Difficulty breathing   Negative: IV Drug Use (IVDU)   Negative: Passed out (i.e., lost consciousness, collapsed and was not responding)   Negative: Shock suspected (e.g., cold/pale/clammy skin, too weak to stand, low BP, rapid pulse)   Negative: Difficult to awaken or acting confused  "(e.g., disoriented, slurred speech)   Negative: Visible sweat on face or sweat dripping down face   Negative: Unable to walk, or can only walk with assistance (e.g., requires support)   Negative: Received SHOCK from implantable cardiac defibrillator and has persisting symptoms (i.e., palpitations, lightheadedness)   Negative: Dizziness, lightheadedness, or weakness and heart beating very rapidly (e.g., > 140 / minute)   Negative: Dizziness, lightheadedness, or weakness and heart beating very slowly (e.g., < 50 / minute)   Negative: Sounds like a life-threatening emergency to the triager   Negative: Chest pain   Negative: Difficulty breathing   Negative: Dizziness, lightheadedness, or weakness   Negative: Heart beating very rapidly (e.g., > 140 / minute) and present now  (Exception: During exercise.)   Negative: Heart beating very slowly (e.g., < 50 / minute)  (Exception: Athlete and heart rate normal for caller.)   Negative: New or worsened shortness of breath with activity (dyspnea on exertion)   Negative: Patient sounds very sick or weak to the triager   Negative: Wearing a 'Holter monitor' or 'cardiac event monitor'   Negative: Received SHOCK from implantable cardiac defibrillator (and now feels well)    Answer Assessment - Initial Assessment Questions  1. TEMPERATURE: \"What is the most recent temperature?\"  \"How was it measured?\"       102  2. ONSET: \"When did the fever start?\"       Yesterday  3. CHILLS: \"Do you have chills?\" If yes: \"How bad are they?\"  (e.g., none, mild, moderate, severe)    - NONE: no chills    - MILD: feeling cold    - MODERATE: feeling very cold, some shivering (feels better under a thick blanket)    - SEVERE: feeling extremely cold with shaking chills (general body shaking, rigors; even under a thick blanket)       Has had some  4. OTHER SYMPTOMS: \"Do you have any other symptoms besides the fever?\"  (e.g., abdomen pain, cough, diarrhea, earache, headache, sore throat, urination pain)      " "Sore throat, cough  5. CAUSE: If there are no symptoms, ask: \"What do you think is causing the fever?\"       N/A  6. CONTACTS: \"Does anyone else in the family have an infection?\"      No  7. TREATMENT: \"What have you done so far to treat this fever?\" (e.g., medications)      Tylenol  8. IMMUNOCOMPROMISE: \"Do you have of the following: diabetes, HIV positive, splenectomy, cancer chemotherapy, chronic steroid treatment, transplant patient, etc.\"      No  9. PREGNANCY: \"Is there any chance you are pregnant?\" \"When was your last menstrual period?\"      N/A  10. TRAVEL: \"Have you traveled out of the country in the last month?\" (e.g., travel history, exposures)        No    Protocols used: Fever-A-OH, Heart Rate and Heartbeat Fohyvscxc-I-JO    "

## 2023-09-20 ENCOUNTER — TELEPHONE (OUTPATIENT)
Dept: FAMILY MEDICINE | Facility: CLINIC | Age: 43
End: 2023-09-20

## 2023-09-20 NOTE — TELEPHONE ENCOUNTER
Pt called needing hospital follow up but needs to be seen before October. Sending to provider to see if same day is ok?           Alva PINEDA Visit Facilitator

## 2023-10-04 ENCOUNTER — ANCILLARY PROCEDURE (OUTPATIENT)
Dept: GENERAL RADIOLOGY | Facility: CLINIC | Age: 43
End: 2023-10-04
Attending: PHYSICIAN ASSISTANT
Payer: MEDICARE

## 2023-10-04 ENCOUNTER — OFFICE VISIT (OUTPATIENT)
Dept: URGENT CARE | Facility: URGENT CARE | Age: 43
End: 2023-10-04
Payer: MEDICARE

## 2023-10-04 VITALS
WEIGHT: 289.9 LBS | TEMPERATURE: 97.6 F | DIASTOLIC BLOOD PRESSURE: 85 MMHG | HEART RATE: 94 BPM | RESPIRATION RATE: 16 BRPM | SYSTOLIC BLOOD PRESSURE: 141 MMHG | OXYGEN SATURATION: 98 % | BODY MASS INDEX: 38.42 KG/M2

## 2023-10-04 DIAGNOSIS — R06.02 SOB (SHORTNESS OF BREATH): ICD-10-CM

## 2023-10-04 DIAGNOSIS — U07.1 COVID-19: Primary | ICD-10-CM

## 2023-10-04 DIAGNOSIS — M62.838 MUSCLE SPASM: ICD-10-CM

## 2023-10-04 DIAGNOSIS — R13.19 ESOPHAGEAL DYSPHAGIA: ICD-10-CM

## 2023-10-04 PROCEDURE — 99214 OFFICE O/P EST MOD 30 MIN: CPT | Performed by: PHYSICIAN ASSISTANT

## 2023-10-04 PROCEDURE — 71046 X-RAY EXAM CHEST 2 VIEWS: CPT | Mod: TC | Performed by: RADIOLOGY

## 2023-10-04 RX ORDER — METHOCARBAMOL 750 MG/1
750 TABLET, FILM COATED ORAL 2 TIMES DAILY PRN
Qty: 60 TABLET | Refills: 0 | Status: SHIPPED | OUTPATIENT
Start: 2023-10-04 | End: 2023-12-21

## 2023-10-04 ASSESSMENT — ENCOUNTER SYMPTOMS
COUGH: 1
MUSCULOSKELETAL NEGATIVE: 1
SHORTNESS OF BREATH: 1
ALLERGIC/IMMUNOLOGIC NEGATIVE: 1
DYSURIA: 0
HEADACHES: 0
VOMITING: 0
HEMATURIA: 0
FREQUENCY: 0
CHEST TIGHTNESS: 0
MYALGIAS: 0
ABDOMINAL PAIN: 0
CHILLS: 0
FEVER: 0
SORE THROAT: 0
PALPITATIONS: 1
DIARRHEA: 0
WHEEZING: 0
NAUSEA: 0
CONSTITUTIONAL NEGATIVE: 1
GASTROINTESTINAL NEGATIVE: 1
NEUROLOGICAL NEGATIVE: 1

## 2023-10-04 ASSESSMENT — PAIN SCALES - GENERAL: PAINLEVEL: NO PAIN (0)

## 2023-10-04 NOTE — PROGRESS NOTES
Chief Complaint:    Chief Complaint   Patient presents with    Cough     Patient reports cough since Covid infection; has improved but got much worse today. Patient reports muscular pain in upper left chest and shoulder earlier today but denies pain at this time.    Tachycardia     Patient reports increase in heart rate with exertion. Patient states it has gone up to 152 with walking from vehicle into class. Patient states he was in ED on 9/18 with tachycardia and was Covid positive at that time.     Medication Refill     Patient states he needs some medications refilled as he is unable to get into his primary care provider.      Medical Decision Making:    Vital signs reviewed by Alex Simeon PA-C  BP (!) 141/85 (BP Location: Left arm, Patient Position: Sitting, Cuff Size: Adult Large)   Pulse 94   Temp 97.6  F (36.4  C) (Tympanic)   Resp 16   Wt 131.5 kg (289 lb 14.4 oz)   SpO2 98%   BMI 38.42 kg/m      Differential Diagnosis:  URI Adult/Peds:  Bronchitis-viral, Pneumonia, and Viral upper respiratory illness      ASSESSMENT:     1. COVID-19    2. SOB (shortness of breath)    3. Esophageal dysphagia    4. Muscle spasm           PLAN:     Patient is in no acute distress.  He is afebrile with stable vital signs.    CXR was negative for any acute infiltrates or consolidations per my read.  Rx for refill of his Omeprazole sent in.  Rx for refill of his Robaxin sent in.  Patient brought up front for appointment to establish care at this location.    Worrisome symptoms discussed with instructions to go to the ED.  Patient verbalized understanding and agreed with this plan.    Labs:     Results for orders placed or performed in visit on 10/04/23   XR Chest 2 Views     Status: None    Narrative    EXAM: XR CHEST 2 VIEWS  LOCATION: [facility]  DATE: 10/4/2023    INDICATION:  SOB (shortness of breath)  COMPARISON: Chest radiograph 05/04/2023      Impression    IMPRESSION: Negative chest.        Current Meds:    Current Outpatient Medications:     gabapentin (NEURONTIN) 300 MG capsule, Take 300 mg by mouth 3 times daily, Disp: , Rfl:     LORazepam (ATIVAN) 2 MG tablet, Take 1 tablet (2 mg) by mouth 3 times daily as needed for anxiety Patient taking 3 mg in the morning, 2 mg in the afternoon and 2 mg in the evening., Disp: 30 tablet, Rfl: 0    methocarbamol (ROBAXIN) 750 MG tablet, Take 1 tablet (750 mg) by mouth 2 times daily as needed for muscle spasms, Disp: 60 tablet, Rfl: 0    mirtazapine (REMERON) 15 MG tablet, 15 mg Patient taking 1.5 tablets at bedtime, Disp: , Rfl: 2    omeprazole (PRILOSEC) 20 MG DR capsule, Take 1 capsule (20 mg) by mouth daily, Disp: 30 capsule, Rfl: 0    order for DME, autoCPAP 8-12cm, Disp:  , Rfl:     Allergies:  Allergies   Allergen Reactions    Nicotine Rash     Nicotine Patches    Pertussis Toxoid      Bad reaction as a child       SUBJECTIVE    HPI: Rafa Cochran is an 43 year old male who presents for evaluation and treatment of cough, SOB with activity, and tachycardia.  Patient was seen in the ED for this on 9/18.  D-dimer and cardiac workup were negative.  Patient was positive for COVID at that time.  He was advised to follow up with his PCP and has not done this.  He continues to have symptoms that come and go.  He is also requesting refill of some medications.  He denies any chest pain, dizziness, nausea, or vomiting.  No fever, chills, or diarrhea.     ROS:      Review of Systems   Constitutional: Negative.  Negative for chills and fever.   HENT: Negative.  Negative for sore throat.    Respiratory:  Positive for cough and shortness of breath. Negative for chest tightness and wheezing.    Cardiovascular:  Positive for palpitations. Negative for chest pain.   Gastrointestinal: Negative.  Negative for abdominal pain, diarrhea, nausea and vomiting.   Genitourinary:  Negative for dysuria, frequency, hematuria and urgency.   Musculoskeletal: Negative.  Negative for  myalgias.   Skin:  Negative for rash.   Allergic/Immunologic: Negative.  Negative for immunocompromised state.   Neurological: Negative.  Negative for headaches.        Family History   Family History   Problem Relation Age of Onset    C.A.D. Maternal Grandfather     Diabetes Maternal Grandfather     Hypertension Maternal Grandfather     Heart Disease Maternal Grandfather     Coronary Artery Disease Maternal Grandfather     Cerebrovascular Disease Maternal Grandfather     C.A.D. Paternal Grandmother     Diabetes Paternal Grandmother     Circulatory Paternal Grandmother     Gastrointestinal Disease Paternal Grandmother     Hypertension Paternal Grandfather     Circulatory Paternal Grandfather     Heart Disease Paternal Grandfather     Gastrointestinal Disease Paternal Grandfather     Heart Failure Paternal Grandfather     Coronary Artery Disease Paternal Grandfather     Hypertension Father     Alcohol/Drug Father     Allergies Father     Depression Father     Gastrointestinal Disease Father     Prostate Cancer Father     Esophageal Cancer Father     Mental Illness Father     Substance Abuse Father     Cerebrovascular Disease Maternal Grandmother     Arthritis Maternal Grandmother     Heart Disease Maternal Grandmother     Alcohol/Drug Mother     Allergies Mother     Circulatory Mother     Depression Mother     Heart Disease Mother     Diabetes Mother     Cerebrovascular Disease Mother         occurred during open heart surgery    Anxiety Disorder Mother     Mental Illness Mother     Asthma Mother     Alcohol/Drug Sister     Allergies Sister     Depression Sister     Genitourinary Problems Sister     Hypertension Sister     Depression Sister     Anxiety Disorder Sister     Mental Illness Sister     Obesity Sister     Depression Sister     Anxiety Disorder Sister     Mental Illness Sister     Depression Brother     Anxiety Disorder Brother     Mental Illness Brother     Asthma Brother        Social History  Social  History     Socioeconomic History    Marital status: Single     Spouse name: Not on file    Number of children: Not on file    Years of education: Not on file    Highest education level: Not on file   Occupational History    Not on file   Tobacco Use    Smoking status: Former     Years: 18.00     Types: Cigarettes     Quit date: 2013     Years since quittin.7     Passive exposure: Past    Smokeless tobacco: Never    Tobacco comments:     5 cigs per day   Vaping Use    Vaping Use: Never used   Substance and Sexual Activity    Alcohol use: Yes     Comment: extremely rare    Drug use: No    Sexual activity: Not Currently     Partners: Female     Birth control/protection: None   Other Topics Concern    Parent/sibling w/ CABG, MI or angioplasty before 65F 55M? No   Social History Narrative    Not on file     Social Determinants of Health     Financial Resource Strain: Not on file   Food Insecurity: Not on file   Transportation Needs: Not on file   Physical Activity: Not on file   Stress: Not on file   Social Connections: Not on file   Interpersonal Safety: Not on file   Housing Stability: Not on file        Surgical History:  Past Surgical History:   Procedure Laterality Date    APPENDECTOMY      COMBINED ESOPHAGOSCOPY, GASTROSCOPY, DUODENOSCOPY (EGD) WITH CO2 INSUFFLATION N/A 2019    Procedure: ESOPHAGOGASTRODUODENOSCOPY, WITH CO2 INSUFFLATION;  Surgeon: Sabas Grajeda MD;  Location:  OR    ESOPHAGOSCOPY, GASTROSCOPY, DUODENOSCOPY (EGD), COMBINED N/A 2019    Procedure: Esophagogastroduodenoscopy, With Biopsy;  Surgeon: Sabas Grajeda MD;  Location: [facility] OR    GENITOURINARY SURGERY  1988        Problem List:  Patient Active Problem List   Diagnosis    Major depression in partial remission (H24)    Bipolar affective disorder (H)    OCD (obsessive compulsive disorder)    Schizoaffective disorder (H)    Sinus tachycardia    Palpitations    Prediabetes    BABATUNDE (obstructive sleep  apnea)- moderate (AHI 17)    Morbid obesity (H)    Gastroesophageal reflux disease, esophagitis presence not specified    Family history of cardiac arrhythmia    H/O esophageal ulcer    Mixed hyperlipidemia           OBJECTIVE:     Vital signs noted and reviewed by Alex Simeon PA-C  BP (!) 141/85 (BP Location: Left arm, Patient Position: Sitting, Cuff Size: Adult Large)   Pulse 94   Temp 97.6  F (36.4  C) (Tympanic)   Resp 16   Wt 131.5 kg (289 lb 14.4 oz)   SpO2 98%   BMI 38.42 kg/m       PEFR:    Physical Exam  Vitals reviewed.   Constitutional:       General: He is not in acute distress.     Appearance: He is well-developed. He is not ill-appearing, toxic-appearing or diaphoretic.   HENT:      Head: Normocephalic and atraumatic.      Right Ear: Hearing, tympanic membrane, ear canal and external ear normal. No drainage, swelling or tenderness. Tympanic membrane is not perforated, erythematous, retracted or bulging.      Left Ear: Hearing, tympanic membrane, ear canal and external ear normal. No drainage, swelling or tenderness. Tympanic membrane is not perforated, erythematous, retracted or bulging.      Nose: No nasal tenderness, mucosal edema, congestion or rhinorrhea.      Right Turbinates: Not enlarged or swollen.      Left Turbinates: Not enlarged or swollen.      Right Sinus: No maxillary sinus tenderness or frontal sinus tenderness.      Left Sinus: No maxillary sinus tenderness or frontal sinus tenderness.      Mouth/Throat:      Pharynx: No pharyngeal swelling, oropharyngeal exudate, posterior oropharyngeal erythema or uvula swelling.      Tonsils: No tonsillar exudate. 0 on the right. 0 on the left.   Eyes:      General: Lids are normal.         Right eye: No discharge.         Left eye: No discharge.      Conjunctiva/sclera: Conjunctivae normal.      Right eye: Right conjunctiva is not injected. No exudate.     Left eye: Left conjunctiva is not injected. No exudate.     Pupils: Pupils are  equal, round, and reactive to light.   Cardiovascular:      Rate and Rhythm: Normal rate and regular rhythm.      Heart sounds: Normal heart sounds. No murmur heard.     No friction rub. No gallop.   Pulmonary:      Effort: Pulmonary effort is normal. No accessory muscle usage, respiratory distress or retractions.      Breath sounds: Normal breath sounds and air entry. No stridor, decreased air movement or transmitted upper airway sounds. No decreased breath sounds, wheezing, rhonchi or rales.   Chest:      Chest wall: No tenderness.   Abdominal:      General: Bowel sounds are normal. There is no distension.      Palpations: Abdomen is soft. Abdomen is not rigid. There is no mass.      Tenderness: There is no abdominal tenderness. There is no guarding or rebound.   Musculoskeletal:         General: Normal range of motion.      Cervical back: Normal range of motion and neck supple.   Lymphadenopathy:      Head:      Right side of head: No submental, submandibular, tonsillar, preauricular or posterior auricular adenopathy.      Left side of head: No submental, submandibular, tonsillar, preauricular or posterior auricular adenopathy.      Cervical:      Right cervical: No superficial or posterior cervical adenopathy.     Left cervical: No superficial or posterior cervical adenopathy.   Skin:     General: Skin is warm.      Capillary Refill: Capillary refill takes less than 2 seconds.   Neurological:      Mental Status: He is alert and oriented to person, place, and time.      Cranial Nerves: No cranial nerve deficit.      Sensory: No sensory deficit.      Motor: No abnormal muscle tone.      Coordination: Coordination normal.      Deep Tendon Reflexes: Reflexes normal.   Psychiatric:         Behavior: Behavior normal. Behavior is cooperative.         Thought Content: Thought content normal.         Judgment: Judgment normal.             Alex Simeon PA-C  10/4/2023, 5:35 PM

## 2023-10-05 ENCOUNTER — OFFICE VISIT (OUTPATIENT)
Dept: FAMILY MEDICINE | Facility: CLINIC | Age: 43
End: 2023-10-05
Payer: MEDICARE

## 2023-10-05 VITALS
HEART RATE: 101 BPM | HEIGHT: 73 IN | OXYGEN SATURATION: 96 % | BODY MASS INDEX: 38.65 KG/M2 | WEIGHT: 291.6 LBS | SYSTOLIC BLOOD PRESSURE: 133 MMHG | DIASTOLIC BLOOD PRESSURE: 75 MMHG | RESPIRATION RATE: 16 BRPM

## 2023-10-05 DIAGNOSIS — F41.9 ANXIETY: ICD-10-CM

## 2023-10-05 DIAGNOSIS — R06.02 SOB (SHORTNESS OF BREATH): ICD-10-CM

## 2023-10-05 DIAGNOSIS — J40 BRONCHITIS: ICD-10-CM

## 2023-10-05 DIAGNOSIS — R00.0 TACHYCARDIA: ICD-10-CM

## 2023-10-05 DIAGNOSIS — U07.1 INFECTION DUE TO 2019 NOVEL CORONAVIRUS: Primary | ICD-10-CM

## 2023-10-05 PROCEDURE — 99214 OFFICE O/P EST MOD 30 MIN: CPT | Performed by: INTERNAL MEDICINE

## 2023-10-05 PROCEDURE — 96127 BRIEF EMOTIONAL/BEHAV ASSMT: CPT | Performed by: INTERNAL MEDICINE

## 2023-10-05 RX ORDER — INHALER, ASSIST DEVICES
SPACER (EA) MISCELLANEOUS
Qty: 1 EACH | Refills: 0 | Status: SHIPPED | OUTPATIENT
Start: 2023-10-05

## 2023-10-05 RX ORDER — ALBUTEROL SULFATE 90 UG/1
2 AEROSOL, METERED RESPIRATORY (INHALATION) EVERY 6 HOURS PRN
Qty: 18 G | Refills: 1 | Status: SHIPPED | OUTPATIENT
Start: 2023-10-05

## 2023-10-05 RX ORDER — BENZONATATE 100 MG/1
100 CAPSULE ORAL 3 TIMES DAILY PRN
Qty: 30 CAPSULE | Refills: 0 | Status: SHIPPED | OUTPATIENT
Start: 2023-10-05 | End: 2023-11-16

## 2023-10-05 ASSESSMENT — PAIN SCALES - GENERAL: PAINLEVEL: NO PAIN (1)

## 2023-10-05 ASSESSMENT — PATIENT HEALTH QUESTIONNAIRE - PHQ9
10. IF YOU CHECKED OFF ANY PROBLEMS, HOW DIFFICULT HAVE THESE PROBLEMS MADE IT FOR YOU TO DO YOUR WORK, TAKE CARE OF THINGS AT HOME, OR GET ALONG WITH OTHER PEOPLE: EXTREMELY DIFFICULT
SUM OF ALL RESPONSES TO PHQ QUESTIONS 1-9: 12
SUM OF ALL RESPONSES TO PHQ QUESTIONS 1-9: 12

## 2023-10-05 NOTE — COMMUNITY RESOURCES LIST (ENGLISH)
10/05/2023   [facility] [employer]  N/A  For questions about this resource list or additional care needs, please contact your primary care clinic or care manager.  Phone: [phone]   Email: N/A   Address: [address] [place] [address]   Hours: N/A        Financial Stability       Rent and mortgage payment assistance  1  [employer] [facility] ([facility]) Distance: 2.99 miles      In-Person   [address] [place] [address]  Language: English  Hours: Mon - Fri 8:00 AM - 4:00 PM  Fees: Free   Phone: (803) 963-4524 Email: info@[employer] Website: https://[employer]/     2  [employer] Distance: 3.45 miles      Phone/Virtual   [address] [address]  Language: English  Hours: Mon - Fri 9:00 AM - 5:00 PM  Fees: Free   Phone: (123) 695-6780 Email: qazsbkddd6trbkplg@[employer].net Website: https://ilfrnbvck6xvtrvmf.org/          Important Numbers & Websites       Emergency Services   911  [facility] Services   311  Poison Control   (166) 642-4818  Suicide Prevention Lifeline   (377) 533-6013 (TALK)  Child Abuse Hotline   (867) 360-2181 (4-A-Child)  Sexual Assault Hotline   (411) 262-3250 (HOPE)  National Runaway Safeline   (685) 198-4928 (RUNAWAY)  All-Options Talkline   (255) 553-7353  Substance Abuse Referral   (254) 552-4069 (HELP)

## 2023-10-05 NOTE — PROGRESS NOTES
"  Assessment & Plan     Infection due to 2019 novel coronavirus  Diagnosed with COVID on the  18 September  He is still having some shortness of breath and cough  He is concerned about that    SOB (shortness of breath)  He was seen in urgent care yesterday and chest x-ray did not show any focal abnormality  He is still short of breath although his saturations are fine and he is still tachycardic  And concerned about pulmonary embolism we will get a CT chest with PE protocol  - CT Chest w Contrast; Future  - albuterol (PROAIR HFA/PROVENTIL HFA/VENTOLIN HFA) 108 (90 Base) MCG/ACT inhaler; Inhale 2 puffs into the lungs every 6 hours as needed for shortness of breath, wheezing or cough  - spacer (OPTICHAMBER CHIN) holding chamber; Use with inhaler    Tachycardia  His heart rate is around 101 and tells me that he is always tachycardic particularly with any exertional activity  We however also have to rule out PE    Bronchitis    - benzonatate (TESSALON) 100 MG capsule; Take 1 capsule (100 mg) by mouth 3 times daily as needed for cough    Anxiety  Continue with mirtazapine/lorazepam/gabapentin  Prescribed by his psychiatrist      30 minutes spent by me on the date of the encounter doing chart review, history and exam, documentation and further activities per the note       BMI:   Estimated body mass index is 38.47 kg/m  as calculated from the following:    Height as of this encounter: 1.854 m (6' 1\").    Weight as of this encounter: 132.3 kg (291 lb 9.6 oz).           Harrison Salazar MD  [facility] JENIFER Craig is a 43 year old, presenting for the following health issues:  Establish Care      History of Present Illness       [facility]    He eats 2-3 servings of fruits and vegetables daily.He consumes 1 sweetened beverage(s) daily.He exercises with enough effort to increase his heart rate 20 to 29 minutes per day.  He exercises with enough effort to increase " "his heart rate 5 days per week.   He is taking medications regularly.                 Review of Systems   Constitutional, HEENT, cardiovascular, pulmonary, gi and gu systems are negative, except as otherwise noted.      Objective    /75 (BP Location: Right arm, Patient Position: Sitting, Cuff Size: Adult Large)   Pulse 101   Resp 16   Ht 1.854 m (6' 1\")   Wt 132.3 kg (291 lb 9.6 oz)   SpO2 96%   BMI 38.47 kg/m    Body mass index is 38.47 kg/m .  Physical Exam   GENERAL: healthy, alert and no distress  NECK: no adenopathy, no asymmetry, masses, or scars and thyroid normal to palpation  RESP: lungs clear to auscultation - no rales, rhonchi or wheezes  CV: regular rate and rhythm, normal S1 S2, no S3 or S4, no murmur, click or rub, no peripheral edema and peripheral pulses strong  ABDOMEN: soft, nontender, no hepatosplenomegaly, no masses and bowel sounds normal  MS: no gross musculoskeletal defects noted, no edema                      "

## 2023-10-10 ENCOUNTER — TELEPHONE (OUTPATIENT)
Dept: FAMILY MEDICINE | Facility: CLINIC | Age: 43
End: 2023-10-10

## 2023-10-10 ENCOUNTER — ANCILLARY PROCEDURE (OUTPATIENT)
Dept: CT IMAGING | Facility: CLINIC | Age: 43
End: 2023-10-10
Attending: INTERNAL MEDICINE
Payer: MEDICARE

## 2023-10-10 DIAGNOSIS — R06.02 SOB (SHORTNESS OF BREATH): ICD-10-CM

## 2023-10-10 PROCEDURE — G1010 CDSM STANSON: HCPCS | Performed by: RADIOLOGY

## 2023-10-10 PROCEDURE — 71275 CT ANGIOGRAPHY CHEST: CPT | Mod: MG | Performed by: RADIOLOGY

## 2023-10-10 RX ORDER — IOPAMIDOL 755 MG/ML
83 INJECTION, SOLUTION INTRAVASCULAR ONCE
Status: COMPLETED | OUTPATIENT
Start: 2023-10-10 | End: 2023-10-10

## 2023-10-10 RX ADMIN — IOPAMIDOL 83 ML: 755 INJECTION, SOLUTION INTRAVASCULAR at 18:41

## 2023-10-10 NOTE — TELEPHONE ENCOUNTER
PC from radiology.   Scan inconclusive - Cannot rule out small PE but no large PE noted and no secondary signs of PE noted on exam.    Instructions will be given to patient by radiology to go to ED if acutely short of breath for repeat scan.      Routing to Dr Salazar for his information.   Could repeat scan if high suspicion for PE present.      PSK

## 2023-11-05 DIAGNOSIS — R13.19 ESOPHAGEAL DYSPHAGIA: ICD-10-CM

## 2023-11-06 ENCOUNTER — OFFICE VISIT (OUTPATIENT)
Dept: OPTOMETRY | Facility: CLINIC | Age: 43
End: 2023-11-06
Payer: MEDICARE

## 2023-11-06 DIAGNOSIS — H52.13 HIGH MYOPIA, BOTH EYES: ICD-10-CM

## 2023-11-06 DIAGNOSIS — Z01.00 ENCOUNTER FOR EXAMINATION OF EYES AND VISION WITHOUT ABNORMAL FINDINGS: Primary | ICD-10-CM

## 2023-11-06 DIAGNOSIS — H52.223 REGULAR ASTIGMATISM OF BOTH EYES: ICD-10-CM

## 2023-11-06 PROCEDURE — 92004 COMPRE OPH EXAM NEW PT 1/>: CPT | Performed by: OPTOMETRIST

## 2023-11-06 PROCEDURE — 92015 DETERMINE REFRACTIVE STATE: CPT | Mod: GY | Performed by: OPTOMETRIST

## 2023-11-06 ASSESSMENT — TONOMETRY
IOP_METHOD: APPLANATION
OD_IOP_MMHG: 20
OS_IOP_MMHG: 21

## 2023-11-06 ASSESSMENT — CONF VISUAL FIELD
OS_SUPERIOR_TEMPORAL_RESTRICTION: 0
OD_SUPERIOR_TEMPORAL_RESTRICTION: 0
OS_SUPERIOR_NASAL_RESTRICTION: 0
OD_SUPERIOR_NASAL_RESTRICTION: 0
OS_INFERIOR_TEMPORAL_RESTRICTION: 0
OS_INFERIOR_NASAL_RESTRICTION: 0
OD_INFERIOR_NASAL_RESTRICTION: 0
OD_INFERIOR_TEMPORAL_RESTRICTION: 0
OD_NORMAL: 1
OS_NORMAL: 1
METHOD: COUNTING FINGERS

## 2023-11-06 ASSESSMENT — VISUAL ACUITY
OD_CC: 20/40
OS_CC+: -2
CORRECTION_TYPE: GLASSES
OD_CC+: +2
OS_SC: CF @ 3'
OD_SC: 20/20-1
OD_SC: 20/500
OS_SC: 20/20
METHOD: SNELLEN - LINEAR
OD_CC: 20/20
OS_CC: 20/25
OS_CC: 20/20

## 2023-11-06 ASSESSMENT — REFRACTION_WEARINGRX
OD_CYLINDER: +0.75
OD_AXIS: 117
OS_CYLINDER: +1.00
OS_AXIS: 078
OD_SPHERE: -6.75
SPECS_TYPE: SVL
OS_SPHERE: -7.75

## 2023-11-06 ASSESSMENT — CUP TO DISC RATIO
OD_RATIO: 0.2
OS_RATIO: 0.2

## 2023-11-06 ASSESSMENT — EXTERNAL EXAM - RIGHT EYE: OD_EXAM: NORMAL

## 2023-11-06 ASSESSMENT — SLIT LAMP EXAM - LIDS
COMMENTS: NORMAL
COMMENTS: NORMAL

## 2023-11-06 ASSESSMENT — REFRACTION_MANIFEST
OS_AXIS: 083
OS_CYLINDER: +0.75
OS_SPHERE: -7.75
OD_AXIS: 112
OD_CYLINDER: +1.00
OD_SPHERE: -6.75

## 2023-11-06 ASSESSMENT — EXTERNAL EXAM - LEFT EYE: OS_EXAM: NORMAL

## 2023-11-06 NOTE — PATIENT INSTRUCTIONS
Patient educated on importance of good blood sugar control.  Letter sent to primary care provider with diabetic eye exam report.     Updated glasses prescription provided today.   Allow 2 weeks to adapt to the new glasses.     Return in 1 year for a comprehensive eye exam, or sooner if needed.      The effects of the dilating drops last for 4- 6 hours.  You will be more sensitive to light and vision will be blurry up close.  Mydriatic sunglasses were given if needed.     Kiel Mckenzie, OD  [facility] [place]  [address] [place] [address]. [place]  [place]  [address]    [phone]

## 2023-11-06 NOTE — PROGRESS NOTES
Chief Complaint   Patient presents with    Diabetic Eye Exam        Chief Complaint(s) and History of Present Illness(es)       Diabetic Eye Exam              Vision: is stable    Diabetes Type: controlled with diet (Pre-diabetes)    Duration: years    Blood Sugars: Doesn't check                    Lab Results   Component Value Date    A1C ERROR PATIENT NOT AVAILABLE FOR RETEST 12/23/2014       Last Eye Exam: 2022- [facility]   Dilated Previously: Yes, side effects of dilation explained today    What are you currently using to see?  Glasses - SVL - wears full time     Distance Vision Acuity: Noticed gradual change in both eyes    Near Vision Acuity: Not satisfied     Eye Comfort: dry, watery, burning and itchy  Do you use eye drops? 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       OBJECTIVE: See Ophthalmology exam    ASSESSMENT:    ICD-10-CM    1. Encounter for examination of eyes and vision without abnormal findings  Z01.00 EYE EXAM (SIMPLE-NONBILLABLE)      2. High myopia, both eyes  H52.13 EYE EXAM (SIMPLE-NONBILLABLE)     REFRACTION      3. Regular astigmatism of both eyes  H52.223 EYE EXAM (SIMPLE-NONBILLABLE)     REFRACTION          PLAN:    Rafa Cochran aware  eye exam results will be sent to No Ref-Primary, Physician.  Patient Instructions   Patient educated on importance of good blood sugar control.  Letter sent to primary care provider with diabetic eye exam report.     Updated glasses prescription provided today.   Allow 2 weeks to adapt to the new glasses.     Return in 1 year for a comprehensive eye exam, or sooner if needed.      The effects of the dilating drops last for 4- 6 hours.  You will be more sensitive to light and vision will be blurry up close.  Mydriatic sunglasses were given if needed.     Kiel Mckenzie, OD  [facility]  [place]  [address]  [place]  [address]    [phone]

## 2023-11-06 NOTE — LETTER
11/6/2023         RE: Rafa Cochran  [address]  [place] [address]        Dear Colleague,    Thank you for referring your patient, Rafa Cochran, to the [facility]. Please see a copy of my visit note below.    Chief Complaint   Patient presents with     Diabetic Eye Exam        Chief Complaint(s) and History of Present Illness(es)       Diabetic Eye Exam              Vision: is stable    Diabetes Type: controlled with diet (Pre-diabetes)    Duration: years    Blood Sugars: Doesn't check                    Lab Results   Component Value Date    A1C ERROR PATIENT NOT AVAILABLE FOR RETEST 12/23/2014       Last Eye Exam: 2022- [facility]   Dilated Previously: Yes, side effects of dilation explained today    What are you currently using to see?  Glasses - SVL - wears full time     Distance Vision Acuity: Noticed gradual change in both eyes    Near Vision Acuity: Not satisfied     Eye Comfort: dry, watery, burning and itchy  Do you use eye drops? 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       OBJECTIVE: See Ophthalmology exam    ASSESSMENT:    ICD-10-CM    1. Encounter for examination of eyes and vision without abnormal findings  Z01.00 EYE EXAM (SIMPLE-NONBILLABLE)      2. High myopia, both eyes  H52.13 EYE EXAM (SIMPLE-NONBILLABLE)     REFRACTION      3. Regular astigmatism of both eyes  H52.223 EYE EXAM (SIMPLE-NONBILLABLE)     REFRACTION          PLAN:    Rafa Cochran aware  eye exam results will be sent to No Ref-Primary, Physician.  Patient Instructions   Patient educated on importance of good blood sugar control.  Letter sent to primary care provider with diabetic eye exam report.     Updated glasses prescription provided today.   Allow 2 weeks to adapt to the new glasses.     Return in 1 year for a comprehensive eye  exam, or sooner if needed.      The effects of the dilating drops last for 4- 6 hours.  You will be more sensitive to light and vision will be blurry up close.  Mydriatic sunglasses were given if needed.     Kiel Mckenzie OD  [address] [place]  [address]    [phone]           Again, thank you for allowing me to participate in the care of your patient.        Sincerely,        Kiel Mckenzie OD

## 2023-11-16 ENCOUNTER — OFFICE VISIT (OUTPATIENT)
Dept: FAMILY MEDICINE | Facility: CLINIC | Age: 43
End: 2023-11-16
Attending: INTERNAL MEDICINE
Payer: MEDICARE

## 2023-11-16 VITALS
DIASTOLIC BLOOD PRESSURE: 84 MMHG | SYSTOLIC BLOOD PRESSURE: 134 MMHG | HEART RATE: 102 BPM | TEMPERATURE: 97.7 F | WEIGHT: 291 LBS | RESPIRATION RATE: 16 BRPM | BODY MASS INDEX: 36.18 KG/M2 | HEIGHT: 75 IN | OXYGEN SATURATION: 97 %

## 2023-11-16 DIAGNOSIS — F41.9 ANXIETY: ICD-10-CM

## 2023-11-16 DIAGNOSIS — R00.0 TACHYCARDIA: Primary | ICD-10-CM

## 2023-11-16 DIAGNOSIS — F31.77 BIPOLAR DISORDER, IN PARTIAL REMISSION, MOST RECENT EPISODE MIXED (H): Chronic | ICD-10-CM

## 2023-11-16 PROCEDURE — 99214 OFFICE O/P EST MOD 30 MIN: CPT | Performed by: INTERNAL MEDICINE

## 2023-11-16 ASSESSMENT — ANXIETY QUESTIONNAIRES
3. WORRYING TOO MUCH ABOUT DIFFERENT THINGS: NEARLY EVERY DAY
6. BECOMING EASILY ANNOYED OR IRRITABLE: NEARLY EVERY DAY
IF YOU CHECKED OFF ANY PROBLEMS ON THIS QUESTIONNAIRE, HOW DIFFICULT HAVE THESE PROBLEMS MADE IT FOR YOU TO DO YOUR WORK, TAKE CARE OF THINGS AT HOME, OR GET ALONG WITH OTHER PEOPLE: VERY DIFFICULT
1. FEELING NERVOUS, ANXIOUS, OR ON EDGE: NEARLY EVERY DAY
5. BEING SO RESTLESS THAT IT IS HARD TO SIT STILL: SEVERAL DAYS
7. FEELING AFRAID AS IF SOMETHING AWFUL MIGHT HAPPEN: NEARLY EVERY DAY
GAD7 TOTAL SCORE: 17
GAD7 TOTAL SCORE: 17
2. NOT BEING ABLE TO STOP OR CONTROL WORRYING: MORE THAN HALF THE DAYS
4. TROUBLE RELAXING: MORE THAN HALF THE DAYS

## 2023-11-16 ASSESSMENT — PAIN SCALES - GENERAL: PAINLEVEL: NO PAIN (0)

## 2023-11-16 NOTE — COMMUNITY RESOURCES LIST (ENGLISH)
11/16/2023   [facility]  N/A  For questions about this resource list or additional care needs, please contact your primary care clinic or care manager.  Phone: [phone]   Email: N/A   Address: [address] [place] [address]   Hours: N/A        Financial Stability       Rent and mortgage payment assistance  1  Community Action [facility] ([facility]) Distance: 2.99 miles      In-Person   [address] [place] [address]  Language: English  Hours: Mon - Fri 8:00 AM - 4:00 PM  Fees: Free   Phone: (553) 436-9680 Email: info@[facility].Invia.cz Website: https://[facility].Invia.cz/     2  [facility] to [facility] Distance: 3.45 miles      Phone/Virtual   [address] [place] [address]  Language: English  Hours: Mon - Fri 9:00 AM - 5:00 PM  Fees: Free   Phone: (819) 718-1018 Email: urnyrlnda0dgcgwal@[employer].PDV Website: https://[email or web address].org/          Food and Nutrition       Food pantry  3  [facility] Food Shelf Distance: 4.99 miles      Pickup   [address] [place] [address]  Language: English  Hours: Thu 2:00 PM - 6:00 PM , Thu 3:00 PM - 6:00 PM  Fees: Free   Phone: (779) 539-8657 Email: foodshelf@[employer].org Website: https://[employer].org/serve/food-shelf/     4  Community Emergency Assistance Programs (CEAP) - [facility] - Food Market Distance: 5.34 miles      Pickup   [address] [place] [address]  Language: English, Persian  Hours: Mon - Tue 9:00 AM - 4:30 PM , Wed 9:00 AM - 7:00 PM , Thu 9:00 AM - 4:30 PM  Fees: Free   Phone: (148) 904-9118 Email: info@[employer] Website: http://www.ceap.org     SNAP application assistance  5  Second [facility] [place] Distance: 3.33 miles      Phone/Virtual   [address] [place] [address]  Language: English, Persian  Hours: Mon 9:00 AM - 1:00 PM , Tue - Thu 9:00 AM - 4:00 PM , Fri 9:00 AM - 1:00  PM  Fees: Free   Phone: (126) 711-9246 Email: info@[employer].Liftopia Website: http://www.Phosphagenicsorg/     6  [facility] Distance: 5.34 miles      In-Person, Phone/Virtual   [address] [place] [address]  Language: American Sign Language, English  Hours: Mon - Fri 8:00 AM - 4:00 PM  Fees: Free   Phone: (357) 439-5297 Email: servicecenterinfo@[facility] Website: https://www.[employer]/residents#human-services     Soup kitchen or free meals  7  [facility] - [employer] Distance: 4.43 miles      Pickup   [address] [place] [address]  Language: English, Maori  Hours: Sat 5:30 PM - 6:30 PM  Fees: Free   Phone: (110) 919-9048 Email: info@[facility] Website: https://www.[facility].Liftopia/     8  [facility] and Fishes - Community Meal Distance: 4.57 miles      Pickup   [address] [place] [address]  Language: English  Hours: Mon 5:30 PM - 6:30 PM  Fees: Free   Phone: (173) 495-9145 Email: contact@[employer].Liftopia Website: https://www.[employer].Liftopia/          Important Numbers & Websites       Emergency Services   911  [facility]   311  Poison Control   (789) 865-1763  Suicide Prevention Lifeline   (654) 921-4771 (TALK)  Child Abuse Hotline   (445) 502-6822 (4-A-Child)  Sexual Assault Hotline   (730) 791-4760 (HOPE)  National Runaway Safeline   (934) 128-5261 (RUNAWAY)  All-Options Talkline   (725) 713-2205  Substance Abuse Referral   (465) 543-1635 (HELP)

## 2023-11-16 NOTE — COMMUNITY RESOURCES LIST (ENGLISH)
11/16/2023   [facility]  N/A  For questions about this resource list or additional care needs, please contact your primary care clinic or care manager.  Phone: [phone]   Email: N/A   Address: [address] [place] [address]   Hours: N/A        Financial Stability       Rent and mortgage payment assistance  1  Community Action [facility] ([facility]) Distance: 2.99 miles      In-Person   [address] [place] [address]  Language: English  Hours: Mon - Fri 8:00 AM - 4:00 PM  Fees: Free   Phone: (142) 791-3592 Email: info@[facility].CupomNow Website: https://[facility].CupomNow/     2  [facility] to [facility] Distance: 3.45 miles      Phone/Virtual   [address] [place] [address]  Language: English  Hours: Mon - Fri 9:00 AM - 5:00 PM  Fees: Free   Phone: (115) 377-9082 Email: odhngakro1snqkggr@[employer].MeetMe Website: https://cpuibgeif0tbuiqfb.org/          Food and Nutrition       Food pantry  3  [facility] Food Shelf Distance: 4.99 miles      Pickup   [address] [place] [address]  Language: English  Hours: Thu 2:00 PM - 6:00 PM , Thu 3:00 PM - 6:00 PM  Fees: Free   Phone: (781) 852-5000 Email: foodshelf@[employer].org Website: https://[employer].org/serve/food-shelf/     4  Community Emergency Assistance Programs (CEAP) - [facility] - Food Market Distance: 5.34 miles      Pickup   [address] [place] [address]  Language: English, German  Hours: Mon - Tue 9:00 AM - 4:30 PM , Wed 9:00 AM - 7:00 PM , Thu 9:00 AM - 4:30 PM  Fees: Free   Phone: (252) 255-5292 Email: info@[employer] Website: http://www.ceap.org     SNAP application assistance  5  Second [facility] [place] Distance: 3.33 miles      Phone/Virtual   [address] [place] [address]  Language: English, German  Hours: Mon 9:00 AM - 1:00 PM , Tue - Thu 9:00 AM - 4:00 PM , Fri 9:00 AM - 1:00  PM  Fees: Free   Phone: (675) 279-3633 Email: info@[employer].Teamisto Website: http://www.Parrutorg/     6  [facility] Distance: 5.34 miles      In-Person, Phone/Virtual   [address] [place] [address]  Language: American Sign Language, English  Hours: Mon - Fri 8:00 AM - 4:00 PM  Fees: Free   Phone: (566) 982-4625 Email: servicecenterinfo@[facility] Website: https://www.[employer]/residents#human-services     Soup kitchen or free meals  7  [facility] - [facility] [employer] [facility]ishes Distance: 4.43 miles      Pickup   [address] [place] [address]  Language: English, Vietnamese  Hours: Sat 5:30 PM - 6:30 PM  Fees: Free   Phone: (782) 143-4091 Email: info@[facility] Website: https://www.[facility].Teamisto/     8  [facility] - [facility] and Fishes - Community Meal Distance: 4.57 miles      Pickup   [address] [place] [address]  Language: English  Hours: Mon 5:30 PM - 6:30 PM  Fees: Free   Phone: (400) 183-4971 Email: contact@[employer].Teamisto Website: https://www.ticketscript.Teamisto/          Important Numbers & Websites       Emergency Services   911  [facility]   311  Poison Control   (524) 323-8536  Suicide Prevention Lifeline   (170) 496-1022 (TALK)  Child Abuse Hotline   (537) 533-8438 (4-A-Child)  Sexual Assault Hotline   (381) 621-6446 (HOPE)  National Runaway Safeline   (232) 722-3257 (RUNAWAY)  All-Options Talkline   (856) 135-4124  Substance Abuse Referral   (268) 684-3890 (HELP)

## 2023-11-16 NOTE — PROGRESS NOTES
"  Assessment & Plan     Tachycardia  Complaining of ongoing tachycardia  He always had issues with tachycardia but recently it is getting worse  Tachycardia is getting worse after the COVID  Does not drink any energy drinks  No excessive caffeine intake  No history of any drug abuse  One-point his tachycardia reached even 180s  At that point he was sitting on the toilet  He felt dizzy  Other times it reaches around 140s and 150s when he is exerting himself and other times at rest it would be around 100  I did see that he had a cardiac MRI in 2019 which was unremarkable  He had echocardiograms in the past which were normal  He had a Holter in 2020 which only showed PVCs  He is on Ativan currently at 2 mg 3 times a day  He was on 2 mg 4 times a day and this was being tapered  He tells me tachycardia as well as during the time when he is coming of Ativan or whenever the Ativan starts wearing off  EKG in May showed only sinus tachycardia  Denies any chest pains  I suspect that he is heavily dependent on benzos and the tachycardia happens whenever he is coming of benzos  We will check a Holter monitor and if this is negative I will start him on some propanolol  - Adult Leadless EKG Monitor 3 to 7 Days; Future    Anxiety  He is on Ativan 2 mg 3 times a day  Follows up with psychiatrist    Bipolar disorder, in partial remission, most recent episode mixed (H)  Also has a history of bipolar and is on mirtazapine and follows with psychiatrist      30 minutes spent by me on the date of the encounter doing chart review, history and exam, documentation and further activities per the note       BMI:   Estimated body mass index is 36.56 kg/m  as calculated from the following:    Height as of this encounter: 1.9 m (6' 2.8\").    Weight as of this encounter: 132 kg (291 lb).           Harrison Salazar MD  [facility] is a 43 year old, presenting for the following health issues:  Results " (CT SCAN 10/10/2023)        11/16/2023     3:20 PM   Additional Questions   Roomed by Crystal   Accompanied by self         11/16/2023     3:20 PM   Patient Reported Additional Medications   Patient reports taking the following new medications none       History of Present Illness       Mental Health Follow-up:  Patient presents to follow-up on Depression & Anxiety.Patient's depression since last visit has been:  No change  The patient is having other symptoms associated with depression.  Patient's anxiety since last visit has been:  No change  The patient is having other symptoms associated with anxiety.  Any significant life events: job concerns, financial concerns, housing concerns, grief or loss and health concerns  Patient is feeling anxious or having panic attacks.  Patient has no concerns about alcohol or drug use.    Vascular Disease:  He presents for follow up of vascular disease.     He never takes nitroglycerin. He is not taking daily aspirin.    Reason for visit:  Covid\CT Scan follow up\Cardio-Pulmonary concerns    He eats 2-3 servings of fruits and vegetables daily.He consumes 1 sweetened beverage(s) daily.He exercises with enough effort to increase his heart rate 30 to 60 minutes per day.  He exercises with enough effort to increase his heart rate 5 days per week.   He is taking medications regularly.       Vital signs  Blood pressure 134/84  Temperature 97.4  Pulse 102  Review of systems:  All 10 point of review of systems was negative except the stated once above where he has some anxiety with regards to his mental health issues and some palpitations on his CVS review of symptoms  His shortness of breath has gotten better  On examination  General: Appears tired  HEENT: Tympanic membrane's normal  Mouth clear  Eyes pupils equal reacting to light   Chest: Clear  Heart S1-S2 regular no murmurs  Abdomen soft  Extremities no edema

## 2023-11-22 ENCOUNTER — ANCILLARY PROCEDURE (OUTPATIENT)
Dept: CARDIOLOGY | Facility: CLINIC | Age: 43
End: 2023-11-22
Attending: INTERNAL MEDICINE
Payer: MEDICARE

## 2023-11-22 ENCOUNTER — APPOINTMENT (OUTPATIENT)
Dept: OPTOMETRY | Facility: CLINIC | Age: 43
End: 2023-11-22
Payer: MEDICARE

## 2023-11-22 DIAGNOSIS — R00.0 TACHYCARDIA: ICD-10-CM

## 2023-11-22 PROCEDURE — 92340 FIT SPECTACLES MONOFOCAL: CPT | Performed by: OPTOMETRIST

## 2023-11-22 PROCEDURE — 93244 EXT ECG>48HR<7D REV&INTERPJ: CPT | Performed by: INTERNAL MEDICINE

## 2023-11-22 PROCEDURE — 93242 EXT ECG>48HR<7D RECORDING: CPT | Performed by: INTERNAL MEDICINE

## 2023-11-22 NOTE — PATIENT INSTRUCTIONS
Patient has been prescribed a ZioPatch holter for 3 days.  Patient was instructed regarding the indication, function, care and prompt return of the ZioPatch holter monitor. The monitor, with S/N [id number],  was placed on the patient with instructions regarding care of the skin, electrodes, and monitor, as well as documentation in the patient diary. Patient demonstrated understanding of this information and agreed to call [employer] with further questions or concerns.

## 2023-12-21 DIAGNOSIS — M62.838 MUSCLE SPASM: ICD-10-CM

## 2023-12-21 RX ORDER — METHOCARBAMOL 750 MG/1
750 TABLET, FILM COATED ORAL 2 TIMES DAILY PRN
Qty: 60 TABLET | Refills: 0 | Status: SHIPPED | OUTPATIENT
Start: 2023-12-21 | End: 2024-01-26

## 2024-01-05 ENCOUNTER — VIRTUAL VISIT (OUTPATIENT)
Dept: FAMILY MEDICINE | Facility: CLINIC | Age: 44
End: 2024-01-05
Payer: MEDICARE

## 2024-01-05 DIAGNOSIS — R00.2 PALPITATIONS: ICD-10-CM

## 2024-01-05 DIAGNOSIS — F41.9 ANXIETY: ICD-10-CM

## 2024-01-05 DIAGNOSIS — F31.77 BIPOLAR DISORDER, IN PARTIAL REMISSION, MOST RECENT EPISODE MIXED (H): Chronic | ICD-10-CM

## 2024-01-05 DIAGNOSIS — I44.1 MOBITZ (TYPE) I (WENCKEBACH'S) ATRIOVENTRICULAR BLOCK: Primary | ICD-10-CM

## 2024-01-05 PROCEDURE — 99441 PR PHYSICIAN TELEPHONE EVALUATION 5-10 MIN: CPT | Mod: 95 | Performed by: INTERNAL MEDICINE

## 2024-01-05 ASSESSMENT — PATIENT HEALTH QUESTIONNAIRE - PHQ9
SUM OF ALL RESPONSES TO PHQ QUESTIONS 1-9: 6
SUM OF ALL RESPONSES TO PHQ QUESTIONS 1-9: 6
10. IF YOU CHECKED OFF ANY PROBLEMS, HOW DIFFICULT HAVE THESE PROBLEMS MADE IT FOR YOU TO DO YOUR WORK, TAKE CARE OF THINGS AT HOME, OR GET ALONG WITH OTHER PEOPLE: VERY DIFFICULT

## 2024-01-05 NOTE — PROGRESS NOTES
"    Instructions Relayed to Patient by Virtual Roomer:     Patient is active on ShowEvidence:   Relayed following to patient: \"It looks like you are active on ShowEvidence, are you able to join the visit this way? If not, do you need us to send you a link now or would you like your provider to send a link via text or email when they are ready to initiate the visit?\"    Reminded patient to ensure they were logged on to virtual visit by arrival time listed. Documented in appointment notes if patient had flexibility to initiate visit sooner than arrival time. If pediatric virtual visit, ensured pediatric patient along with parent/guardian will be present for video visit.     Patient offered the website [email or web address] and/or phone number to ShowEvidence Help line: [phone]  Rafa is a 43 year old who is being evaluated via a billable telephone visit.      What phone number would you like to be contacted at? [phone]   How would you like to obtain your AVS? PinBridgeharLogicBay    Distant Location (provider location):  On-site    Assessment & Plan     Mobitz (type) I (Wenckebach's) atrioventricular block  He was complaining of some palpitations recently  We obtained a Holter monitor which showed that his average heart rate is between 90 and 130 and  briefly it reached a peak of 150  However it also showed that he has got Wenckebach Mobitz type I block  He wants to explore the options of propranolol for his anxiety issues  In the light of the Holter monitor showing Mobitz type I Wenckebach block I would be hesitant to start him on a beta-blocker without clearance from cardiology  Referral to cardiology placed  - Adult Cardiology Eval  Referral; Future    Bipolar disorder, in partial remission, most recent episode mixed (H)  Follows up with psychiatrist    Palpitations  The Holter monitor clearly showed that the palpitations are from sinus rhythm and sinus tachycardia when they were matched with his " "diary    Anxiety  He has anxiety issues and on benzodiazepines  He is being slowly tapered off them by his psychiatrist  However he also wants to explore the treatment options with propranolol  I was planning to start him on this but because of the findings on Holter monitor as noted above we need clearance from cardiology      30 minutes spent by me on the date of the encounter doing chart review, history and exam, documentation and further activities per the note       BMI:   Estimated body mass index is 36.56 kg/m  as calculated from the following:    Height as of 11/16/23: 1.9 m (6' 2.8\").    Weight as of 11/16/23: 132 kg (291 lb).           Harrison Salazar MD  [facility] JENIFER Craig is a 43 year old, presenting for the following health issues:  Heart Monitor        1/5/2024     6:51 AM   Additional Questions   Roomed by David YEN   Accompanied by Self       History of Present Illness       Vascular Disease:  He presents for follow up of vascular disease.     He never takes nitroglycerin. He is not taking daily aspirin.    He eats 0-1 servings of fruits and vegetables daily.He consumes 0 sweetened beverage(s) daily.He exercises with enough effort to increase his heart rate 60 or more minutes per day.  He exercises with enough effort to increase his heart rate 7 days per week.   He is taking medications regularly.               Review of Systems   Constitutional, HEENT, cardiovascular, pulmonary, gi and gu systems are negative, except as otherwise noted.      Objective           Vitals:  No vitals were obtained today due to virtual visit.    Physical Exam   healthy, alert, and no distress  PSYCH: Alert and oriented times 3; coherent speech, normal   rate and volume, able to articulate logical thoughts, able   to abstract reason, no tangential thoughts, no hallucinations   or delusions  His affect is normal  RESP: No cough, no audible wheezing, able to talk in full " sentences  Remainder of exam unable to be completed due to telephone visits                Phone call duration: 10 minutes

## 2024-01-26 DIAGNOSIS — M62.838 MUSCLE SPASM: ICD-10-CM

## 2024-01-26 RX ORDER — METHOCARBAMOL 750 MG/1
TABLET, FILM COATED ORAL
Qty: 60 TABLET | Refills: 0 | Status: SHIPPED | OUTPATIENT
Start: 2024-01-26 | End: 2024-02-26

## 2024-02-25 DIAGNOSIS — M62.838 MUSCLE SPASM: ICD-10-CM

## 2024-02-26 RX ORDER — METHOCARBAMOL 750 MG/1
TABLET, FILM COATED ORAL
Qty: 60 TABLET | Refills: 0 | Status: SHIPPED | OUTPATIENT
Start: 2024-02-26 | End: 2024-02-27

## 2024-02-27 ENCOUNTER — TELEPHONE (OUTPATIENT)
Dept: FAMILY MEDICINE | Facility: CLINIC | Age: 44
End: 2024-02-27
Payer: MEDICARE

## 2024-02-27 DIAGNOSIS — M62.838 MUSCLE SPASM: Primary | ICD-10-CM

## 2024-02-27 NOTE — TELEPHONE ENCOUNTER
Methocarbamol not covered by patient's plan. The preferred alt is Tizanidine tab, cyclobenzaprine tab, tizanidine cap, chlorzoxazone tab. Please call/fax the pharmacy to change medication along with strength, directions, quantity, and refills.

## 2024-02-28 ENCOUNTER — OFFICE VISIT (OUTPATIENT)
Dept: CARDIOLOGY | Facility: CLINIC | Age: 44
End: 2024-02-28
Attending: INTERNAL MEDICINE
Payer: MEDICARE

## 2024-02-28 VITALS
OXYGEN SATURATION: 95 % | DIASTOLIC BLOOD PRESSURE: 80 MMHG | WEIGHT: 286 LBS | SYSTOLIC BLOOD PRESSURE: 126 MMHG | HEART RATE: 112 BPM | BODY MASS INDEX: 35.94 KG/M2

## 2024-02-28 DIAGNOSIS — I44.1 MOBITZ (TYPE) I (WENCKEBACH'S) ATRIOVENTRICULAR BLOCK: Primary | ICD-10-CM

## 2024-02-28 DIAGNOSIS — E78.5 HYPERLIPIDEMIA LDL GOAL <100: ICD-10-CM

## 2024-02-28 PROCEDURE — 99203 OFFICE O/P NEW LOW 30 MIN: CPT | Performed by: INTERNAL MEDICINE

## 2024-02-28 RX ORDER — METHOCARBAMOL 750 MG/1
750 TABLET, FILM COATED ORAL 4 TIMES DAILY PRN
COMMUNITY
End: 2024-03-27

## 2024-02-28 NOTE — PROGRESS NOTES
General Cardiology Clinic      Referring provider: Harrison Salazar MD    HPI: Mr. Rafa Cochran is a 44 year old male presenting to cardiology clinic today for evaluation of Mobtiz type 1 AV block detected by Zio patch (at 2 AM).  No AV block during the awake hours.  PMH significant for bipolar disorder, anxiety, mixed hyperlipidemia, prediabetes, and BABATUNDE.    Mr Cochran was referred to cardiology clinic after recently experiencing palpitations. Patient was placed on a Holter monitor, which showed predominately sinus tachycardia during daytime. A second degree Mobitz type 1 AV Block was present at night during sleep.  He was recommended to see cardiology before beta-blocker initiation.    Patient has no history of cardiac disease.  He tells me that he has been on lorazepam 2 mg 3 times daily since age 18.  He reports being addicted to benzodiazepines and unfortunately not able to come off of it.  When lorazepam wears off he feels palpitations therefore his primary care physician recommended initiation of propranolol.      Today in clinic, he denies any current chest pain, palpitations, dizziness, lower extremity edema, shortness of breath, HAWK, or PND.     Does not smoke.  No drug abuse.  No hypertension or diabetes.    Lipids in 2022 shows severely elevated total cholesterol at 279, triglycerides 443.  He has not had any lipid profile since then.  2022.     Current Cardiac Medications:  none      PAST MEDICAL HISTORY:  Past Medical History:   Diagnosis Date    Depressive disorder 1991    H/O esophageal ulcer 2013    Nephrolithiasis 2009    Panic attack 07/05/2013    Shoulder pain, left 10/19/2015    Sprain and strain of shoulder and upper arm, left, initial encounter 10/19/2015       CURRENT MEDICATIONS:  Current Outpatient Medications   Medication Sig Dispense Refill    albuterol (PROAIR HFA/PROVENTIL HFA/VENTOLIN HFA) 108 (90 Base) MCG/ACT inhaler Inhale 2 puffs into the lungs every 6 hours as needed for  shortness of breath, wheezing or cough 18 g 1    gabapentin (NEURONTIN) 300 MG capsule Take 300 mg by mouth 3 times daily      LORazepam (ATIVAN) 2 MG tablet Take 1 tablet (2 mg) by mouth 3 times daily as needed for anxiety Patient taking 3 mg in the morning, 2 mg in the afternoon and 2 mg in the evening. 30 tablet 0    mirtazapine (REMERON) 15 MG tablet 15 mg Patient taking 1.5 tablets at bedtime  2    omeprazole (PRILOSEC) 20 MG DR capsule Take 1 capsule (20 mg) by mouth daily 30 capsule 0    order for DME autoCPAP 8-12cm      spacer (OPTICHAMBER CHIN) holding chamber Use with inhaler 1 each 0    tiZANidine (ZANAFLEX) 4 MG tablet Take 1 tablet (4 mg) by mouth 3 times daily as needed for muscle spasms 90 tablet 0       PAST SURGICAL HISTORY:  Past Surgical History:   Procedure Laterality Date    APPENDECTOMY      COMBINED ESOPHAGOSCOPY, GASTROSCOPY, DUODENOSCOPY (EGD) WITH CO2 INSUFFLATION N/A 11/25/2019    Procedure: ESOPHAGOGASTRODUODENOSCOPY, WITH CO2 INSUFFLATION;  Surgeon: Sabas Grajeda MD;  Location: [facility]    ESOPHAGOSCOPY, GASTROSCOPY, DUODENOSCOPY (EGD), COMBINED N/A 11/25/2019    Procedure: Esophagogastroduodenoscopy, With Biopsy;  Surgeon: Sabas Grajeda MD;  Location: [facility] OR    GENITOURINARY SURGERY  1988       ALLERGIES:  Allergies   Allergen Reactions    Nicotine Rash     Nicotine Patches    Pertussis Toxoid      Bad reaction as a child    Seasonal Allergies        FAMILY HISTORY:  Family History   Problem Relation Age of Onset    Alcohol/Drug Mother     Allergies Mother     Circulatory Mother     Depression Mother     Heart Disease Mother     Diabetes Mother     Cerebrovascular Disease Mother         occurred during open heart surgery    Anxiety Disorder Mother     Mental Illness Mother     Asthma Mother     Hypertension Father     Alcohol/Drug Father     Allergies Father     Depression Father     Gastrointestinal Disease Father     Prostate Cancer Father     Esophageal  Cancer Father     Mental Illness Father     Substance Abuse Father     Cerebrovascular Disease Maternal Grandmother     Arthritis Maternal Grandmother     Heart Disease Maternal Grandmother     C.A.D. Maternal Grandfather     Diabetes Maternal Grandfather     Hypertension Maternal Grandfather     Heart Disease Maternal Grandfather     Coronary Artery Disease Maternal Grandfather     Cerebrovascular Disease Maternal Grandfather     Macular Degeneration Paternal Grandmother     C.A.D. Paternal Grandmother     Diabetes Paternal Grandmother     Circulatory Paternal Grandmother     Gastrointestinal Disease Paternal Grandmother     Hypertension Paternal Grandfather     Circulatory Paternal Grandfather     Heart Disease Paternal Grandfather     Gastrointestinal Disease Paternal Grandfather     Heart Failure Paternal Grandfather     Coronary Artery Disease Paternal Grandfather     Depression Brother     Anxiety Disorder Brother     Mental Illness Brother     Asthma Brother     Alcohol/Drug Sister     Allergies Sister     Depression Sister     Genitourinary Problems Sister     Hypertension Sister     Depression Sister     Anxiety Disorder Sister     Mental Illness Sister     Obesity Sister     Depression Sister     Anxiety Disorder Sister     Mental Illness Sister     Glaucoma No family hx of        SOCIAL HISTORY:  Social History     Tobacco Use    Smoking status: Former     Years: 18     Types: Cigarettes     Quit date: 12/25/2013     Years since quitting: 10.1     Passive exposure: Past    Smokeless tobacco: Never    Tobacco comments:     5 cigs per day   Vaping Use    Vaping Use: Never used   Substance Use Topics    Alcohol use: Yes     Comment: extremely rare    Drug use: No       ROS:   Constitutional: No fever, chills, or sweats.   Cardiovascular: As per HPI.       Exam:  /80   Pulse 112   Wt 129.7 kg (286 lb)   SpO2 95%   BMI 35.94 kg/m     GENERAL: alert and no distress  HEENT: no icterus, no central  cyanosis  LYMPH/NECK: no adenopathy, no asymmetry  RESPIRATORY: lungs  clear to auscultation - no rales, rhonchi or wheezes, no use of accessory muscles, no retractions, respirations are unlabored, normal respiratory rate  CARDIOVASCULAR: RRR, +S1S2, no murmur, rub, or gallop.  GI: soft, non tender  EXTREMITIES: no peripheral edema  NEURO: alert, normal speech and affect  SKIN: no jaundice, no rashes or lesions    I have reviewed the labs and personally reviewed the imaging below and made my comment in the assessment and plan.    Labs:  Lab Results   Component Value Date    WBC 7.8 11/17/2022    WBC 8.4 02/27/2020    RBC 4.93 11/17/2022    RBC 5.21 02/27/2020    HGB 14.6 11/17/2022    HGB 14.9 02/27/2020    HCT 41.5 11/17/2022    HCT 44.3 02/27/2020    MCV 84 11/17/2022    MCV 85 02/27/2020    MCH 29.6 11/17/2022    MCH 28.6 02/27/2020    MCHC 35.2 11/17/2022    MCHC 33.6 02/27/2020    RDW 12.4 11/17/2022    RDW 12.3 02/27/2020     11/17/2022     02/27/2020       Lab Results   Component Value Date     11/17/2022     09/24/2020    POTASSIUM 4.3 11/17/2022    POTASSIUM 3.9 09/24/2020    CHLORIDE 108 11/17/2022    CHLORIDE 106 09/24/2020    CO2 27 11/17/2022    CO2 25 09/24/2020    ANIONGAP 5 11/17/2022    ANIONGAP 8 09/24/2020     (H) 11/17/2022    GLC 98 09/24/2020    BUN 14 11/17/2022    BUN 16 09/24/2020    CR 0.75 11/17/2022    CR 0.88 09/24/2020    GFRESTIMATED >90 11/17/2022    GFRESTIMATED >90 09/24/2020    GFRESTBLACK >90 09/24/2020    SOLEDAD 9.6 11/17/2022    SOLEDAD 9.2 09/24/2020        Lab Results   Component Value Date    CHOL 279 11/17/2022    CHOL 260 02/27/2020     Lab Results   Component Value Date    HDL 54 11/17/2022    HDL 48 02/27/2020     Lab Results   Component Value Date    LDL  11/17/2022      Comment:      Cannot estimate LDL when triglyceride exceeds 400 mg/dL     11/17/2022    LDL  02/27/2020     Cannot estimate LDL when triglyceride exceeds 400 mg/dL    LDL  186 02/27/2020     Lab Results   Component Value Date    TRIG 443 11/17/2022    TRIG 434 02/27/2020     Lab Results   Component Value Date    CHOLHDLRATIO 5.4 10/28/2014       Diagnostics  EKG 5/4/23      Ambulatory Monitor 11/22/23        Cardiac MRI 2019  Normal cardiac structure and function. No cardiomyopathy. The patient had sinus tachycardia, likely from  anxiety, and I suspect this may have contributed to the incorrect diagnosis of cardiomyopathy on  echocardiography at [facility].    Assessment and Plan:   Mr. Cochran is a 44 year old male with a PMH of bipolar disorder, anxiety, mixed hyperlipidemia, prediabetes, and BABATUNDE.    # Sinus Tachycardia  # Second Degree AV Block, Mobitz Type 1 detected by Zio patch  -1 episode of Mobitz type I AV Wenckebach block at night during sleep.  Not concerning for the plans to initiate propranolol for anxiety.  -Patient can be started on propranolol.    # Severe hyperlipidemia  # Morbid obesity  -Recommend to repeat lab tests for hyperlipidemia  -CT calcium screening    Follow up:  -3 months with MOISE    Total time spent today for this visit is 23 minutes including precharting, face-to-face clinic visit, review of labs/imaging and medical documentation.    Celena CHUNG MD  [facility] Division of Cardiology  Pager [phone]

## 2024-02-28 NOTE — NURSING NOTE
"Chief Complaint   Patient presents with    New Patient     New general cardiology for Mobitz (type) I (Wenckebach's) atrioventricular block       Initial BP (!) 147/91 (BP Location: Right arm, Patient Position: Chair, Cuff Size: Adult Large)   Pulse 93   Wt 129.7 kg (286 lb)   SpO2 95%   BMI 35.94 kg/m   Estimated body mass index is 35.94 kg/m  as calculated from the following:    Height as of 11/16/23: 1.9 m (6' 2.8\").    Weight as of this encounter: 129.7 kg (286 lb)..  BP completed using cuff size: ANA Villalobos    "

## 2024-02-28 NOTE — LETTER
2/28/2024      RE: Rafa Cochran  [address]  [place] [address]       Dear Colleague,    Thank you for the opportunity to participate in the care of your patient, Rafa Cochran, at the [facility]. Please see a copy of my visit note below.           General Cardiology Clinic      Referring provider: Harrison Salazar MD    HPI: Mr. Rafa Cochran is a 44 year old male presenting to cardiology clinic today for evaluation of Mobtiz type 1 AV block detected by Zio patch (at 2 AM).  No AV block during the awake hours.  PMH significant for bipolar disorder, anxiety, mixed hyperlipidemia, prediabetes, and BABATUNDE.    Mr Cochran was referred to cardiology clinic after recently experiencing palpitations. Patient was placed on a Holter monitor, which showed predominately sinus tachycardia during daytime. A second degree Mobitz type 1 AV Block was present at night during sleep.  He was recommended to see cardiology before beta-blocker initiation.    Patient has no history of cardiac disease.  He tells me that he has been on lorazepam 2 mg 3 times daily since age 18.  He reports being addicted to benzodiazepines and unfortunately not able to come off of it.  When lorazepam wears off he feels palpitations therefore his primary care physician recommended initiation of propranolol.      Today in clinic, he denies any current chest pain, palpitations, dizziness, lower extremity edema, shortness of breath, HAWK, or PND.     Does not smoke.  No drug abuse.  No hypertension or diabetes.    Lipids in 2022 shows severely elevated total cholesterol at 279, triglycerides 443.  He has not had any lipid profile since then.  2022.     Current Cardiac Medications:  none      PAST MEDICAL HISTORY:  Past Medical History:   Diagnosis Date    Depressive disorder 1991    H/O esophageal ulcer 2013    Nephrolithiasis 2009    Panic attack 07/05/2013     Shoulder pain, left 10/19/2015    Sprain and strain of shoulder and upper arm, left, initial encounter 10/19/2015       CURRENT MEDICATIONS:  Current Outpatient Medications   Medication Sig Dispense Refill    albuterol (PROAIR HFA/PROVENTIL HFA/VENTOLIN HFA) 108 (90 Base) MCG/ACT inhaler Inhale 2 puffs into the lungs every 6 hours as needed for shortness of breath, wheezing or cough 18 g 1    gabapentin (NEURONTIN) 300 MG capsule Take 300 mg by mouth 3 times daily      LORazepam (ATIVAN) 2 MG tablet Take 1 tablet (2 mg) by mouth 3 times daily as needed for anxiety Patient taking 3 mg in the morning, 2 mg in the afternoon and 2 mg in the evening. 30 tablet 0    mirtazapine (REMERON) 15 MG tablet 15 mg Patient taking 1.5 tablets at bedtime  2    omeprazole (PRILOSEC) 20 MG DR capsule Take 1 capsule (20 mg) by mouth daily 30 capsule 0    order for DME autoCPAP 8-12cm      spacer (OPTICHAMBER CHIN) holding chamber Use with inhaler 1 each 0    tiZANidine (ZANAFLEX) 4 MG tablet Take 1 tablet (4 mg) by mouth 3 times daily as needed for muscle spasms 90 tablet 0       PAST SURGICAL HISTORY:  Past Surgical History:   Procedure Laterality Date    APPENDECTOMY      COMBINED ESOPHAGOSCOPY, GASTROSCOPY, DUODENOSCOPY (EGD) WITH CO2 INSUFFLATION N/A 11/25/2019    Procedure: ESOPHAGOGASTRODUODENOSCOPY, WITH CO2 INSUFFLATION;  Surgeon: Sabas Grajeda MD;  Location: [facility] OR    ESOPHAGOSCOPY, GASTROSCOPY, DUODENOSCOPY (EGD), COMBINED N/A 11/25/2019    Procedure: Esophagogastroduodenoscopy, With Biopsy;  Surgeon: Sabas Grajeda MD;  Location: [facility] OR    GENITOURINARY SURGERY  1988       ALLERGIES:  Allergies   Allergen Reactions    Nicotine Rash     Nicotine Patches    Pertussis Toxoid      Bad reaction as a child    Seasonal Allergies        FAMILY HISTORY:  Family History   Problem Relation Age of Onset    Alcohol/Drug Mother     Allergies Mother     Circulatory Mother     Depression Mother     Heart Disease  Mother     Diabetes Mother     Cerebrovascular Disease Mother         occurred during open heart surgery    Anxiety Disorder Mother     Mental Illness Mother     Asthma Mother     Hypertension Father     Alcohol/Drug Father     Allergies Father     Depression Father     Gastrointestinal Disease Father     Prostate Cancer Father     Esophageal Cancer Father     Mental Illness Father     Substance Abuse Father     Cerebrovascular Disease Maternal Grandmother     Arthritis Maternal Grandmother     Heart Disease Maternal Grandmother     C.A.D. Maternal Grandfather     Diabetes Maternal Grandfather     Hypertension Maternal Grandfather     Heart Disease Maternal Grandfather     Coronary Artery Disease Maternal Grandfather     Cerebrovascular Disease Maternal Grandfather     Macular Degeneration Paternal Grandmother     C.A.D. Paternal Grandmother     Diabetes Paternal Grandmother     Circulatory Paternal Grandmother     Gastrointestinal Disease Paternal Grandmother     Hypertension Paternal Grandfather     Circulatory Paternal Grandfather     Heart Disease Paternal Grandfather     Gastrointestinal Disease Paternal Grandfather     Heart Failure Paternal Grandfather     Coronary Artery Disease Paternal Grandfather     Depression Brother     Anxiety Disorder Brother     Mental Illness Brother     Asthma Brother     Alcohol/Drug Sister     Allergies Sister     Depression Sister     Genitourinary Problems Sister     Hypertension Sister     Depression Sister     Anxiety Disorder Sister     Mental Illness Sister     Obesity Sister     Depression Sister     Anxiety Disorder Sister     Mental Illness Sister     Glaucoma No family hx of        SOCIAL HISTORY:  Social History     Tobacco Use    Smoking status: Former     Years: 18     Types: Cigarettes     Quit date: 12/25/2013     Years since quitting: 10.1     Passive exposure: Past    Smokeless tobacco: Never    Tobacco comments:     5 cigs per day   Vaping Use    Vaping Use:  Never used   Substance Use Topics    Alcohol use: Yes     Comment: extremely rare    Drug use: No       ROS:   Constitutional: No fever, chills, or sweats.   Cardiovascular: As per HPI.       Exam:  /80   Pulse 112   Wt 129.7 kg (286 lb)   SpO2 95%   BMI 35.94 kg/m     GENERAL: alert and no distress  HEENT: no icterus, no central cyanosis  LYMPH/NECK: no adenopathy, no asymmetry  RESPIRATORY: lungs  clear to auscultation - no rales, rhonchi or wheezes, no use of accessory muscles, no retractions, respirations are unlabored, normal respiratory rate  CARDIOVASCULAR: RRR, +S1S2, no murmur, rub, or gallop.  GI: soft, non tender  EXTREMITIES: no peripheral edema  NEURO: alert, normal speech and affect  SKIN: no jaundice, no rashes or lesions    I have reviewed the labs and personally reviewed the imaging below and made my comment in the assessment and plan.    Labs:  Lab Results   Component Value Date    WBC 7.8 11/17/2022    WBC 8.4 02/27/2020    RBC 4.93 11/17/2022    RBC 5.21 02/27/2020    HGB 14.6 11/17/2022    HGB 14.9 02/27/2020    HCT 41.5 11/17/2022    HCT 44.3 02/27/2020    MCV 84 11/17/2022    MCV 85 02/27/2020    MCH 29.6 11/17/2022    MCH 28.6 02/27/2020    MCHC 35.2 11/17/2022    MCHC 33.6 02/27/2020    RDW 12.4 11/17/2022    RDW 12.3 02/27/2020     11/17/2022     02/27/2020       Lab Results   Component Value Date     11/17/2022     09/24/2020    POTASSIUM 4.3 11/17/2022    POTASSIUM 3.9 09/24/2020    CHLORIDE 108 11/17/2022    CHLORIDE 106 09/24/2020    CO2 27 11/17/2022    CO2 25 09/24/2020    ANIONGAP 5 11/17/2022    ANIONGAP 8 09/24/2020     (H) 11/17/2022    GLC 98 09/24/2020    BUN 14 11/17/2022    BUN 16 09/24/2020    CR 0.75 11/17/2022    CR 0.88 09/24/2020    GFRESTIMATED >90 11/17/2022    GFRESTIMATED >90 09/24/2020    GFRESTBLACK >90 09/24/2020    SOLEDAD 9.6 11/17/2022    SOLEDAD 9.2 09/24/2020        Lab Results   Component Value Date    CHOL 279 11/17/2022     CHOL 260 02/27/2020     Lab Results   Component Value Date    HDL 54 11/17/2022    HDL 48 02/27/2020     Lab Results   Component Value Date    LDL  11/17/2022      Comment:      Cannot estimate LDL when triglyceride exceeds 400 mg/dL     11/17/2022    LDL  02/27/2020     Cannot estimate LDL when triglyceride exceeds 400 mg/dL     02/27/2020     Lab Results   Component Value Date    TRIG 443 11/17/2022    TRIG 434 02/27/2020     Lab Results   Component Value Date    CHOLHDLRATIO 5.4 10/28/2014       Diagnostics  EKG 5/4/23      Ambulatory Monitor 11/22/23        Cardiac MRI 2019  Normal cardiac structure and function. No cardiomyopathy. The patient had sinus tachycardia, likely from  anxiety, and I suspect this may have contributed to the incorrect diagnosis of cardiomyopathy on  echocardiography at [facility].    Assessment and Plan:   Mr. Cochran is a 44 year old male with a PMH of bipolar disorder, anxiety, mixed hyperlipidemia, prediabetes, and BABATUNDE.    # Sinus Tachycardia  # Second Degree AV Block, Mobitz Type 1 detected by Zio patch  -1 episode of Mobitz type I AV Wenckebach block at night during sleep.  Not concerning for the plans to initiate propranolol for anxiety.  -Patient can be started on propranolol.    # Severe hyperlipidemia  # Morbid obesity  -Recommend to repeat lab tests for hyperlipidemia  -CT calcium screening    Follow up:  -3 months with MOISE    Total time spent today for this visit is 23 minutes including precharting, face-to-face clinic visit, review of labs/imaging and medical documentation.          Please do not hesitate to contact me if you have any questions/concerns.     Sincerely,     Celena Carbajal MD

## 2024-02-28 NOTE — PATIENT INSTRUCTIONS
Thank you for coming to the [facility] @ [facility]; please note the following instructions:    1. CT Calcium Score Exam. This exam is $278 and insurance may cover the exam. Please call your insurance prior to your exam and let the  know if the insurance will be paying or not. Insurances that are most likely to cover the exam include: [place] Medicaid, BCBS, UHC, [employer] and [employer]. Final determination will be made when your insurance receives the claim. If your insurance does not cover the exam, you will be given the uninsured discount and will be responsible for paying $114.81 at the time of the visit. For those with Medicare Advantage plans, they require a predetermination prior to completing the CT calcium screen. When the you call to schedule a CT calcium screen you will be scheduled 30-days out to allow for the predetermination process to be completed.    Please do not consume any caffeine 8 hours prior to your test. Locations for this exam include [place], [facility], or [place]. Please call [phone] to schedule.    2. Fasting labs at your convenience.    3. Follow up in 3 months with Pebbles LOVELL    4. We are sending a letter to your primary care provider to start propanolol.     5. Please monitor your blood pressure at home.         If you have any questions regarding your visit please contact your care team:     Cardiology  Telephone Number   Brittni AMANDA., RN  Angella CARVALHO, RN  Dorothea CASTRO, RN  Debora ALICIA, ANA PERLA, ANA RIVAS, Visit Facilitator  Elle MASTERSON [facility] [phone] (option 1)   For scheduling appts:     [phone] (select option 1)       For the Device Clinic (Pacemakers and ICD's)  RN's :  Karen Wilkins   During business hours: [phone]    *After business hours:  [phone] (select option 4)      Normal test result notifications will be released via [employer] or mailed within 7 business days.  All other test results, will be  communicated via telephone once reviewed by your cardiologist.    If you need a medication refill please contact your pharmacy.  Please allow 3 business days for your refill to be completed.    As always, thank you for trusting us with your health care needs!

## 2024-02-29 ENCOUNTER — LAB (OUTPATIENT)
Dept: LAB | Facility: CLINIC | Age: 44
End: 2024-02-29
Payer: MEDICARE

## 2024-02-29 DIAGNOSIS — E78.5 HYPERLIPIDEMIA LDL GOAL <100: ICD-10-CM

## 2024-02-29 LAB
CHOLEST SERPL-MCNC: 300 MG/DL
FASTING STATUS PATIENT QL REPORTED: YES
HDLC SERPL-MCNC: 45 MG/DL
LDLC SERPL CALC-MCNC: ABNORMAL MG/DL
NONHDLC SERPL-MCNC: 255 MG/DL
TRIGL SERPL-MCNC: 543 MG/DL

## 2024-02-29 PROCEDURE — 83721 ASSAY OF BLOOD LIPOPROTEIN: CPT | Mod: XU

## 2024-02-29 PROCEDURE — 36415 COLL VENOUS BLD VENIPUNCTURE: CPT

## 2024-02-29 PROCEDURE — 80061 LIPID PANEL: CPT

## 2024-03-01 LAB — LDLC SERPL DIRECT ASSAY-MCNC: 187 MG/DL

## 2024-03-15 ENCOUNTER — MYC MEDICAL ADVICE (OUTPATIENT)
Dept: CARDIOLOGY | Facility: CLINIC | Age: 44
End: 2024-03-15
Payer: MEDICARE

## 2024-03-15 NOTE — TELEPHONE ENCOUNTER
Patient saw Dr. Carbajal on 2/28/24. Patient was advised to have CT Calcium Score Exam. [employer] message sent to patient with number to call.    Angella Pérez, RN, BSN  Cardiology RN Care Coordinator   [place]   Phone: [phone]  Fax: [phone] ([place]) [phone] ([place])

## 2024-03-27 ENCOUNTER — MYC MEDICAL ADVICE (OUTPATIENT)
Dept: FAMILY MEDICINE | Facility: CLINIC | Age: 44
End: 2024-03-27
Payer: MEDICARE

## 2024-03-27 DIAGNOSIS — M62.838 MUSCLE SPASM: Primary | ICD-10-CM

## 2024-03-27 RX ORDER — METHOCARBAMOL 750 MG/1
750 TABLET, FILM COATED ORAL 4 TIMES DAILY PRN
Qty: 90 TABLET | Refills: 0 | Status: SHIPPED | OUTPATIENT
Start: 2024-03-27 | End: 2024-05-13

## 2024-03-27 NOTE — TELEPHONE ENCOUNTER
See MyChart encounter below. Routing to provider to review and advise.    See TE 2/27/24, looks like methocarbamol not covered and preferred alt is tizanidine, this was sent but patient does not want tizanidine. Requesting script for Methocarbamol and he will pay OOP. Please advise is appropriate, thank you!      Avis Caceres, HANNAHN, RN  [facility]

## 2024-05-10 DIAGNOSIS — M62.838 MUSCLE SPASM: ICD-10-CM

## 2024-05-13 RX ORDER — METHOCARBAMOL 750 MG/1
TABLET, FILM COATED ORAL
Qty: 90 TABLET | Refills: 0 | Status: SHIPPED | OUTPATIENT
Start: 2024-05-13 | End: 2024-06-27

## 2024-06-03 ENCOUNTER — TELEPHONE (OUTPATIENT)
Dept: CARDIOLOGY | Facility: CLINIC | Age: 44
End: 2024-06-03
Payer: MEDICARE

## 2024-06-03 NOTE — TELEPHONE ENCOUNTER
Per Pebbles Lawrence, called and LVM for patient to return call to clinic scheduler to reschedule follow-up appointment with Pebbles Lawrence on 6/4/24 until after testing has been complete. Stressed to patient that if he is having symptoms to please keep appointment, but if not, testing should be completed prior to follow-up per provider.    JEFFERY Espinoza    
RN spoke to patient. Patient cancelled appointment as he was unaware he scheduled it. Patient plans to check if insurance covers CT and will return call to schedule if insurance can cover.    JEFFERY Espinoza    
Prophylactic measure

## 2024-06-03 NOTE — TELEPHONE ENCOUNTER
Called and spoke with patient. Patient states that he was not aware of appointment tomorrow and was agreeable with postponing appointment until after CT Calcium Screen. Provided number for patient to call and schedule CT Calcium Screen. Patient states that he did not look into pursuing propranolol as his blood pressures have been trending in the 120s/70s-80s. Patient states that he will also be speaking with his PCP regarding starting a cholesterol-lowering medication.     Patient does wish to pursue CT Calcium Screen. Informed patient to call insurance prior to make sure it is covered. Informed patient to call back with whether or not he is pursuing CT Calcium Screen.    Angella Pérez, RN, BSN  Cardiology RN Care Coordinator   [place]   Phone: [phone]  Fax: [phone] ([place]) [phone] ([place])     Z Plasty Text: The lesion was extirpated to the level of the fat with a #15 scalpel blade.  Given the location of the defect, shape of the defect and the proximity to free margins a Z-plasty was deemed most appropriate for repair.  Using a sterile surgical marker, the appropriate transposition arms of the Z-plasty were drawn incorporating the defect and placing the expected incisions within the relaxed skin tension lines where possible.    The area thus outlined was incised deep to adipose tissue with a #15 scalpel blade.  The skin margins were undermined to an appropriate distance in all directions utilizing iris scissors.  The opposing transposition arms were then transposed into place in opposite direction and anchored with interrupted buried subcutaneous sutures.

## 2024-06-03 NOTE — TELEPHONE ENCOUNTER
----- Message from NAS Don CNP sent at 6/3/2024  8:38 AM CDT -----  Alvarado Perales,    I see that you are covering for Brittni. Looking at my schedule for tomorrow and this patient has not completed the CT calcium screen ordered by Dr. Carbajal, and does not appear to be on propranolol (which he was cleared to start per dr. Carbajal), so I feel his follow-up would be better completed after he has done his imaging as we have nothing new to discuss tomorrow.     Can you help him get his imaging scheduled and re-schedule his follow-up, unless he has new concerns?    Thanks,  Pebbles

## 2024-06-27 DIAGNOSIS — M62.838 MUSCLE SPASM: ICD-10-CM

## 2024-06-27 RX ORDER — METHOCARBAMOL 750 MG/1
TABLET, FILM COATED ORAL
Qty: 90 TABLET | Refills: 0 | Status: SHIPPED | OUTPATIENT
Start: 2024-06-27 | End: 2024-08-12

## 2024-07-06 ENCOUNTER — HEALTH MAINTENANCE LETTER (OUTPATIENT)
Age: 44
End: 2024-07-06

## 2024-07-31 DIAGNOSIS — R13.19 ESOPHAGEAL DYSPHAGIA: ICD-10-CM

## 2024-08-10 DIAGNOSIS — M62.838 MUSCLE SPASM: ICD-10-CM

## 2024-08-12 RX ORDER — METHOCARBAMOL 750 MG/1
TABLET, FILM COATED ORAL
Qty: 90 TABLET | Refills: 0 | Status: SHIPPED | OUTPATIENT
Start: 2024-08-12 | End: 2024-09-23

## 2024-09-23 DIAGNOSIS — M62.838 MUSCLE SPASM: ICD-10-CM

## 2024-09-23 RX ORDER — METHOCARBAMOL 750 MG/1
TABLET, FILM COATED ORAL
Qty: 90 TABLET | Refills: 0 | Status: SHIPPED | OUTPATIENT
Start: 2024-09-23

## 2024-09-29 ENCOUNTER — OFFICE VISIT (OUTPATIENT)
Dept: URGENT CARE | Facility: URGENT CARE | Age: 44
End: 2024-09-29
Payer: MEDICARE

## 2024-09-29 ENCOUNTER — MYC MEDICAL ADVICE (OUTPATIENT)
Dept: FAMILY MEDICINE | Facility: CLINIC | Age: 44
End: 2024-09-29

## 2024-09-29 VITALS
WEIGHT: 268.1 LBS | HEART RATE: 106 BPM | DIASTOLIC BLOOD PRESSURE: 84 MMHG | RESPIRATION RATE: 12 BRPM | BODY MASS INDEX: 33.69 KG/M2 | OXYGEN SATURATION: 98 % | SYSTOLIC BLOOD PRESSURE: 121 MMHG | TEMPERATURE: 98.4 F

## 2024-09-29 DIAGNOSIS — M62.838 MUSCLE SPASM: ICD-10-CM

## 2024-09-29 DIAGNOSIS — M62.830 SPASM OF THORACIC BACK MUSCLE: ICD-10-CM

## 2024-09-29 DIAGNOSIS — M54.6 ACUTE BILATERAL THORACIC BACK PAIN: Primary | ICD-10-CM

## 2024-09-29 PROCEDURE — 99213 OFFICE O/P EST LOW 20 MIN: CPT | Performed by: NURSE PRACTITIONER
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
## 2024-09-30 ENCOUNTER — NURSE TRIAGE (OUTPATIENT)
Dept: FAMILY MEDICINE | Facility: CLINIC | Age: 44
End: 2024-09-30
Payer: MEDICARE

## 2024-09-30 NOTE — TELEPHONE ENCOUNTER
Refer to brad from 9/29.  States that the back pain started over the weekend. Located on hi upper back. States that he thinks it is due to work due to heavy lifting and twisting.   Pt was seen in  on 9/29.  Has been taking tylenol and ibuprofen with methocarbamol. States that the medications does not really help with pain.   Pain does not radiate to legs.  No SOB, chest pain or fever.    Per protocol pt should be seen in the next few days. Assisted in scheduling a visit.    CHIKA Ponce, RN  [facility]        Reason for Disposition   MODERATE back pain (e.g., interferes with normal activities) and present > 3 days    Additional Information   Negative: Passed out (i.e., fainted, collapsed and was not responding)   Negative: Shock suspected (e.g., cold/pale/clammy skin, too weak to stand, low BP, rapid pulse)   Negative: Sounds like a life-threatening emergency to the triager   Negative: Major injury to the back (e.g., MVA, fall > 10 feet or 3 meters, penetrating injury, etc.)   Negative: Pain in the upper back over the ribs (rib cage) that radiates (travels) into the chest   Negative: Pain in the upper back over the ribs (rib cage) and worsened by coughing (or clearly increases with breathing)   Negative: Back pain during pregnancy   Negative: SEVERE back pain of sudden onset and age > 60 years   Negative: SEVERE abdominal pain (e.g., excruciating)   Negative: Abdominal pain and age > 60 years   Negative: Unable to urinate (or only a few drops) and bladder feels very full   Negative: Loss of bladder or bowel control (urine or bowel incontinence; wetting self, leaking stool) of new-onset   Negative: Numbness (loss of sensation) in groin or rectal area   Negative: Pain radiates into groin, scrotum   Negative: Blood in urine (red, pink, or tea-colored)   Negative: Vomiting and pain over lower ribs of back (i.e., flank - kidney area)   Negative: Weakness of a leg or foot (e.g.,  "unable to bear weight, dragging foot)   Negative: Patient sounds very sick or weak to the triager   Negative: Fever > 100.4 F (38.0 C) and flank pain   Negative: Pain or burning with passing urine (urination)   Negative: SEVERE back pain (e.g., excruciating, unable to do any normal activities) and not improved after pain medicine and CARE ADVICE   Negative: Numbness in an arm or hand (i.e., loss of sensation) and upper back pain   Negative: Numbness in a leg or foot (i.e., loss of sensation)   Negative: High-risk adult (e.g., history of cancer, history of HIV, or history of IV Drug Use)   Negative: Soft tissue infection (e.g., abscess, cellulitis) or other serious infection (e.g., bacteremia) in last 2 weeks   Negative: Painful rash with multiple small blisters grouped together (i.e., dermatomal distribution or 'band' or 'stripe')   Negative: Pain radiates into the thigh or further down the leg, and in both legs    Answer Assessment - Initial Assessment Questions  1. ONSET: \"When did the pain begin?\"   Weekend  2. LOCATION: \"Where does it hurt?\" (upper, mid or lower back)      Upper back  3. SEVERITY: \"How bad is the pain?\"  (e.g., Scale 1-10; mild, moderate, or severe)    - MILD (1-3): Doesn't interfere with normal activities.     - MODERATE (4-7): Interferes with normal activities or awakens from sleep.     - SEVERE (8-10): Excruciating pain, unable to do any normal activities.       Current 6/10  4. PATTERN: \"Is the pain constant?\" (e.g., yes, no; constant, intermittent)       constant  5. RADIATION: \"Does the pain shoot into your legs or somewhere else?\"      no  6. CAUSE:  \"What do you think is causing the back pain?\"       Possible work injury  7. BACK OVERUSE:  \"Any recent lifting of heavy objects, strenuous work or exercise?\"      yes  8. MEDICINES: \"What have you taken so far for the pain?\" (e.g., nothing, acetaminophen, NSAIDS)      Tylenol, ibuprofen, muscle relaxer  9. NEUROLOGIC SYMPTOMS: \"Do you have " "any weakness, numbness, or problems with bowel/bladder control?\"      no  10. OTHER SYMPTOMS: \"Do you have any other symptoms?\" (e.g., fever, abdomen pain, burning with urination, blood in urine)        no  11. PREGNANCY: \"Is there any chance you are pregnant?\" \"When was your last menstrual period?\"        .    Protocols used: Back Pain-A-[place]    "

## 2024-10-01 ENCOUNTER — OFFICE VISIT (OUTPATIENT)
Dept: FAMILY MEDICINE | Facility: CLINIC | Age: 44
End: 2024-10-01
Payer: MEDICARE

## 2024-10-01 VITALS
WEIGHT: 268.1 LBS | HEIGHT: 73 IN | HEART RATE: 94 BPM | SYSTOLIC BLOOD PRESSURE: 140 MMHG | DIASTOLIC BLOOD PRESSURE: 82 MMHG | RESPIRATION RATE: 12 BRPM | TEMPERATURE: 98.5 F | BODY MASS INDEX: 35.53 KG/M2 | OXYGEN SATURATION: 99 %

## 2024-10-01 DIAGNOSIS — M54.6 MIDLINE THORACIC BACK PAIN, UNSPECIFIED CHRONICITY: Primary | ICD-10-CM

## 2024-10-01 DIAGNOSIS — M54.50 BILATERAL LOW BACK PAIN WITHOUT SCIATICA, UNSPECIFIED CHRONICITY: ICD-10-CM

## 2024-10-01 PROCEDURE — 99213 OFFICE O/P EST LOW 20 MIN: CPT | Performed by: NURSE PRACTITIONER

## 2024-10-01 RX ORDER — NAPROXEN 500 MG/1
500 TABLET ORAL 2 TIMES DAILY PRN
Qty: 30 TABLET | Refills: 1 | Status: SHIPPED | OUTPATIENT
Start: 2024-10-01

## 2024-10-01 ASSESSMENT — PATIENT HEALTH QUESTIONNAIRE - PHQ9
10. IF YOU CHECKED OFF ANY PROBLEMS, HOW DIFFICULT HAVE THESE PROBLEMS MADE IT FOR YOU TO DO YOUR WORK, TAKE CARE OF THINGS AT HOME, OR GET ALONG WITH OTHER PEOPLE: VERY DIFFICULT
SUM OF ALL RESPONSES TO PHQ QUESTIONS 1-9: 9
SUM OF ALL RESPONSES TO PHQ QUESTIONS 1-9: 9

## 2024-10-01 ASSESSMENT — PAIN SCALES - GENERAL: PAINLEVEL: EXTREME PAIN (8)

## 2024-10-01 ASSESSMENT — ENCOUNTER SYMPTOMS: BACK PAIN: 1

## 2024-10-01 NOTE — PATIENT INSTRUCTIONS
PLAN:   1.   Symptomatic therapy suggested:   apply heat to affected area, apply ice to affected area, prescription for muscle relaxant, prescription for NSAID given, referral to Physical Therapy    2.  Orders Placed This Encounter   Medications    naproxen (NAPROSYN) 500 MG tablet     Sig: Take 1 tablet (500 mg) by mouth 2 times daily as needed.     Dispense:  30 tablet     Refill:  1     Orders Placed This Encounter   Procedures    Physical Therapy  Referral       3.  CONSULTATION/REFERRAL to physical therapy    Patient needs to follow up in if no improvement,or sooner if worsening of symptoms or other symptoms develop.

## 2024-10-01 NOTE — PROGRESS NOTES
Jamar Craig is a 44 year old, presenting for the following health issues:  Back Pain      10/1/2024     3:05 PM   Additional Questions   Roomed by Margy     History of Present Illness       Back Pain:  He presents for follow up of back pain. Patient's back pain is a new problem.    Original cause of back pain: turning/bending  First noticed back pain: 1-4 weeks ago  Patient feels back pain: constantlyLocation of back pain:  Left middle of back  Description of back pain: burning, cramping, dull ache, sharp, shooting and stabbing  Back pain spreads: right foot    Since patient first noticed back pain, pain is: always present, but gets better and worse  Does back pain interfere with his job:  Yes  On a scale of 1-10 (10 being the worst), patient describes pain as:  8  What makes back pain worse: bending, coughing, certain positions, lying down, sitting, standing, stress and twisting   Acupuncture: not tried  Acetaminophen: not helpful  Activity or exercise: not helpful  Chiropractor:  Helpful  Cold: helpful  Heat: not helpful  Massage: helpful  Muscle relaxants: helpful  NSAIDS: not tried  Opioids: not tried  Physical Therapy: not tried  Rest: not helpful  Steroid Injection: not tried  Stretching: not helpful  Surgery: not tried  TENS unit: not tried  Topical pain relievers: helpful  Other healthcare providers patient is seeing for back pain: Chiropractor   He is taking medications regularly.     Provider Documentation  Link to [facility]RS ([facility]) Flowsheet :[id number]}    Back Pain  Duration of complaint: has been evolving over the last 2 weeks   Had to wash a lot of dishes and then the next day at work was lifting and transferring bags of pizza boxes and transferred to a table   Then the next day pain in his lower back and then radiating across his whole mid and lower back   Then Sunday had 30lb arms weights at home and then lower back      Specific cause: None  Description:   Location of pain: low back  bilateral and middle of back bilateral  Character of pain: dull ache and stabbing  Pain radiation:none  Intensity: 4/10  Accompanying Signs & Symptoms:  Fever: no  Numbness or weakness in legs:  no  Dysuria or Hematuria: no  Bowel or bladder incontinence: no  History:   Any injury (lifting, bending, twisting): no  Work Injury: no  History of back problems: recurrent self limited episodes of low back pain in the past  Any previous MRI or X-rays: no  Any history of back surgery: no  Any cancer history: no  Precipitating factors:   Worsened by: Lifting, Bending, and Standing.  Alleviating factors:  Improved by: lying down   Therapies Tried and outcome: chiropractor      Labs reviewed in EPIC  BP Readings from Last 3 Encounters:   10/01/24 (!) 140/82   09/29/24 121/84   02/28/24 126/80    Wt Readings from Last 3 Encounters:   10/01/24 121.6 kg (268 lb 1.6 oz)   09/29/24 121.6 kg (268 lb 1.6 oz)   02/28/24 129.7 kg (286 lb)                  Patient Active Problem List   Diagnosis    Major depression in partial remission (H)    Bipolar affective disorder (H)    OCD (obsessive compulsive disorder)    Schizoaffective disorder (H)    Sinus tachycardia    Palpitations    Prediabetes    BABATUNDE (obstructive sleep apnea)- moderate (AHI 17)    Morbid obesity (H)    Gastroesophageal reflux disease, esophagitis presence not specified    Family history of cardiac arrhythmia    H/O esophageal ulcer    Mixed hyperlipidemia    Mobitz (type) I (Wenckebach's) atrioventricular block     Past Surgical History:   Procedure Laterality Date    APPENDECTOMY      COMBINED ESOPHAGOSCOPY, GASTROSCOPY, DUODENOSCOPY (EGD) WITH CO2 INSUFFLATION N/A 11/25/2019    Procedure: ESOPHAGOGASTRODUODENOSCOPY, WITH CO2 INSUFFLATION;  Surgeon: Sabas Grajeda MD;  Location:  OR    ESOPHAGOSCOPY, GASTROSCOPY, DUODENOSCOPY (EGD), COMBINED N/A 11/25/2019    Procedure: Esophagogastroduodenoscopy, With Biopsy;  Surgeon: Sabas Grajeda MD;   Location: [facility] OR    GENITOURINARY SURGERY  1988       Social History     Tobacco Use    Smoking status: Former     Current packs/day: 0.00     Types: Cigarettes     Start date: 12/25/1995     Quit date: 12/25/2013     Years since quitting: 10.7     Passive exposure: Past    Smokeless tobacco: Never    Tobacco comments:     5 cigs per day   Substance Use Topics    Alcohol use: Yes     Comment: extremely rare     Family History   Problem Relation Age of Onset    Alcohol/Drug Mother     Allergies Mother     Circulatory Mother     Depression Mother     Heart Disease Mother     Diabetes Mother     Cerebrovascular Disease Mother         occurred during open heart surgery    Anxiety Disorder Mother     Mental Illness Mother     Asthma Mother     Hypertension Father     Alcohol/Drug Father     Allergies Father     Depression Father     Gastrointestinal Disease Father     Prostate Cancer Father     Esophageal Cancer Father     Mental Illness Father     Substance Abuse Father     Cerebrovascular Disease Maternal Grandmother     Arthritis Maternal Grandmother     Heart Disease Maternal Grandmother     C.A.D. Maternal Grandfather     Diabetes Maternal Grandfather     Hypertension Maternal Grandfather     Heart Disease Maternal Grandfather     Coronary Artery Disease Maternal Grandfather     Cerebrovascular Disease Maternal Grandfather     Macular Degeneration Paternal Grandmother     C.A.D. Paternal Grandmother     Diabetes Paternal Grandmother     Circulatory Paternal Grandmother     Gastrointestinal Disease Paternal Grandmother     Hypertension Paternal Grandfather     Circulatory Paternal Grandfather     Heart Disease Paternal Grandfather     Gastrointestinal Disease Paternal Grandfather     Heart Failure Paternal Grandfather     Coronary Artery Disease Paternal Grandfather     Depression Brother     Anxiety Disorder Brother     Mental Illness Brother     Asthma Brother     Alcohol/Drug Sister     Allergies Sister      Depression Sister     Genitourinary Problems Sister     Hypertension Sister     Depression Sister     Anxiety Disorder Sister     Mental Illness Sister     Obesity Sister     Depression Sister     Anxiety Disorder Sister     Mental Illness Sister     Glaucoma No family hx of          Current Outpatient Medications   Medication Sig Dispense Refill    albuterol (PROAIR HFA/PROVENTIL HFA/VENTOLIN HFA) 108 (90 Base) MCG/ACT inhaler Inhale 2 puffs into the lungs every 6 hours as needed for shortness of breath, wheezing or cough 18 g 1    gabapentin (NEURONTIN) 300 MG capsule Take 300 mg by mouth 3 times daily      LORazepam (ATIVAN) 2 MG tablet Take 1 tablet (2 mg) by mouth 3 times daily as needed for anxiety Patient taking 3 mg in the morning, 2 mg in the afternoon and 2 mg in the evening. 30 tablet 0    methocarbamol (ROBAXIN) 750 MG tablet TAKE 1 TABLET(750 MG) BY MOUTH FOUR TIMES DAILY AS NEEDED FOR MUSCLE SPASMS 90 tablet 0    mirtazapine (REMERON) 15 MG tablet 15 mg Patient taking 1.5 tablets at bedtime  2    omeprazole (PRILOSEC) 20 MG DR capsule TAKE 1 CAPSULE(20 MG) BY MOUTH DAILY 90 capsule 0    order for DME autoCPAP 8-12cm      spacer (OPTICHAMRoyal [employer]atri Holidays) holding chamber Use with inhaler 1 each 0     Allergies   Allergen Reactions    Nicotine Rash     Nicotine Patches    Pertussis Toxoid      Bad reaction as a child    Seasonal Allergies        Review of Systems   Constitutional:  Negative for fever and unexpected weight change.   HENT: Negative.     Respiratory: Negative.     Cardiovascular:  Negative for palpitations and leg swelling.   Gastrointestinal:  Negative for abdominal distention and abdominal pain.   Endocrine: Negative for polydipsia, polyphagia and polyuria.   Genitourinary:  Negative for difficulty urinating.   Musculoskeletal:  Positive for back pain. Negative for gait problem, joint swelling and neck pain.   Neurological: Negative.    Hematological: Negative.    Psychiatric/Behavioral:  "Negative.            Objective    Ht 1.86 m (6' 1.23\")   Wt 121.6 kg (268 lb 1.6 oz)   BMI 35.15 kg/m    Body mass index is 35.15 kg/m .  Physical Exam   GENERAL: Patient is well nourished, well developed,in no apparent distress, non-toxic, in no respiratory distress and acyanotic, alert, cooperative, and well hydrated  EYES: Eyes grossly normal to inspection and conjunctivae and sclerae normal  HENT:ear canals and TM's normal and nose and mouth without ulcers or lesions   NECK:normal and supple  CARDIAC:regular rates and rhythm, no murmur, click or rub, and no irregular beats  without LE edema bilaterally  RESP: normal respiratory rate and rhythm, lungs clear to auscultation  unlabored respirations, no intercostal retractions or accessory muscle use  ABD:soft, nontender  SKIN: Skin color, texture, turgor normal. No rashes or lesions.  MS: extremities normal- no gross deformities noted, gait normal, and normal muscle tone  no musculoskeletal defects are noted, gait is age appropriate without ataxia  Lumber/Thoracic Spine Exam: Tender:  left parathoracic muscles, right parathoracic muscles, left para lumbar muscles, right para lumbar muscles  Non-tender:  thoracic spinous processes, lumbar spinous processes  Range of Motion:  left lateral thoracic bending   full, painful, right lateral thoracic bending  full, painful, lumbar flexion  full, painful, lumbar extension  full, painful  Strength:  normal   Special tests:  negative straight leg raises  NEURO: Normal strength and tone, sensory exam grossly normal, mentation intact, and speech normal  PSYCH: Alert, oriented, thought content appropriate,mentation appears normal., affect and mood normal      Assessment & Plan     Midline thoracic back pain, unspecified chronicity  - naproxen (NAPROSYN) 500 MG tablet  Dispense: 30 tablet; Refill: 1  - Physical Therapy  Referral    Bilateral low back pain without sciatica, unspecified chronicity  - naproxen (NAPROSYN) " "500 MG tablet  Dispense: 30 tablet; Refill: 1  - Physical Therapy  Referral    Symptomatic therapy suggested: .  rest the injured area as much as practical, apply heat to affected area, apply ice to affected area, prescription for muscle relaxant, prescription for NSAID given, referral to Physical Therapy         BMI  Estimated body mass index is 35.15 kg/m  as calculated from the following:    Height as of this encounter: 1.86 m (6' 1.23\").    Weight as of this encounter: 121.6 kg (268 lb 1.6 oz).             See Patient Instructions  Patient Instructions   PLAN:   1.   Symptomatic therapy suggested:   apply heat to affected area, apply ice to affected area, prescription for muscle relaxant, prescription for NSAID given, referral to Physical Therapy    2.  Orders Placed This Encounter   Medications    naproxen (NAPROSYN) 500 MG tablet     Sig: Take 1 tablet (500 mg) by mouth 2 times daily as needed.     Dispense:  30 tablet     Refill:  1     Orders Placed This Encounter   Procedures    Physical Therapy  Referral       3.  CONSULTATION/REFERRAL to physical therapy    Patient needs to follow up in if no improvement,or sooner if worsening of symptoms or other symptoms develop.               Signed Electronically by: NAS Kothari CNP    "

## 2024-10-01 NOTE — LETTER
October 1, 2024      Rafa Cochran  [address]  [place] [address]        To Whom It May Concern:    Rafa Cochran  was seen on 10/1/2024  .  Please excuse him  until 10/8/24  due to illness.        Sincerely,        NAS Kothari CNP

## 2024-10-04 ENCOUNTER — THERAPY VISIT (OUTPATIENT)
Dept: PHYSICAL THERAPY | Facility: CLINIC | Age: 44
End: 2024-10-04
Attending: NURSE PRACTITIONER
Payer: MEDICARE

## 2024-10-04 DIAGNOSIS — M54.6 ACUTE LEFT-SIDED THORACIC BACK PAIN: ICD-10-CM

## 2024-10-04 DIAGNOSIS — G89.29 CHRONIC BILATERAL LOW BACK PAIN WITHOUT SCIATICA: Primary | ICD-10-CM

## 2024-10-04 DIAGNOSIS — M54.6 MIDLINE THORACIC BACK PAIN, UNSPECIFIED CHRONICITY: ICD-10-CM

## 2024-10-04 DIAGNOSIS — M54.50 CHRONIC BILATERAL LOW BACK PAIN WITHOUT SCIATICA: Primary | ICD-10-CM

## 2024-10-04 DIAGNOSIS — M54.50 BILATERAL LOW BACK PAIN WITHOUT SCIATICA, UNSPECIFIED CHRONICITY: ICD-10-CM

## 2024-10-04 PROCEDURE — 97110 THERAPEUTIC EXERCISES: CPT | Mod: GP | Performed by: PHYSICAL THERAPIST

## 2024-10-04 PROCEDURE — 97161 PT EVAL LOW COMPLEX 20 MIN: CPT | Mod: GP | Performed by: PHYSICAL THERAPIST
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
## 2024-10-09 ASSESSMENT — ENCOUNTER SYMPTOMS
NEUROLOGICAL NEGATIVE: 1
PALPITATIONS: 0
DIFFICULTY URINATING: 0
RESPIRATORY NEGATIVE: 1
FEVER: 0
ABDOMINAL PAIN: 0
PSYCHIATRIC NEGATIVE: 1
NECK PAIN: 0
UNEXPECTED WEIGHT CHANGE: 0
POLYDIPSIA: 0
JOINT SWELLING: 0
HEMATOLOGIC/LYMPHATIC NEGATIVE: 1
POLYPHAGIA: 0
ABDOMINAL DISTENTION: 0

## 2024-10-11 ENCOUNTER — THERAPY VISIT (OUTPATIENT)
Dept: PHYSICAL THERAPY | Facility: CLINIC | Age: 44
End: 2024-10-11
Payer: MEDICARE

## 2024-10-11 DIAGNOSIS — G89.29 CHRONIC BILATERAL LOW BACK PAIN WITHOUT SCIATICA: Primary | ICD-10-CM

## 2024-10-11 DIAGNOSIS — M54.50 CHRONIC BILATERAL LOW BACK PAIN WITHOUT SCIATICA: Primary | ICD-10-CM

## 2024-10-11 DIAGNOSIS — M54.6 ACUTE LEFT-SIDED THORACIC BACK PAIN: ICD-10-CM

## 2024-10-11 PROCEDURE — 97140 MANUAL THERAPY 1/> REGIONS: CPT | Mod: GP | Performed by: PHYSICAL THERAPIST

## 2024-10-11 PROCEDURE — 97110 THERAPEUTIC EXERCISES: CPT | Mod: GP | Performed by: PHYSICAL THERAPIST

## 2024-10-18 ENCOUNTER — THERAPY VISIT (OUTPATIENT)
Dept: PHYSICAL THERAPY | Facility: CLINIC | Age: 44
End: 2024-10-18
Payer: MEDICARE

## 2024-10-18 DIAGNOSIS — M54.50 CHRONIC BILATERAL LOW BACK PAIN WITHOUT SCIATICA: Primary | ICD-10-CM

## 2024-10-18 DIAGNOSIS — M54.6 ACUTE LEFT-SIDED THORACIC BACK PAIN: ICD-10-CM

## 2024-10-18 DIAGNOSIS — G89.29 CHRONIC BILATERAL LOW BACK PAIN WITHOUT SCIATICA: Primary | ICD-10-CM

## 2024-10-18 PROCEDURE — 97140 MANUAL THERAPY 1/> REGIONS: CPT | Mod: GP | Performed by: PHYSICAL THERAPIST

## 2024-10-18 PROCEDURE — 97110 THERAPEUTIC EXERCISES: CPT | Mod: GP | Performed by: PHYSICAL THERAPIST

## 2024-10-18 PROCEDURE — 97112 NEUROMUSCULAR REEDUCATION: CPT | Mod: GP | Performed by: PHYSICAL THERAPIST

## 2024-10-22 DIAGNOSIS — M62.838 MUSCLE SPASM: ICD-10-CM

## 2024-10-22 RX ORDER — METHOCARBAMOL 750 MG/1
TABLET, FILM COATED ORAL
Qty: 90 TABLET | Refills: 0 | Status: SHIPPED | OUTPATIENT
Start: 2024-10-22

## 2024-10-29 DIAGNOSIS — R13.19 ESOPHAGEAL DYSPHAGIA: ICD-10-CM

## 2024-11-01 ENCOUNTER — THERAPY VISIT (OUTPATIENT)
Dept: PHYSICAL THERAPY | Facility: CLINIC | Age: 44
End: 2024-11-01
Payer: MEDICARE

## 2024-11-01 DIAGNOSIS — M54.6 MIDLINE THORACIC BACK PAIN, UNSPECIFIED CHRONICITY: ICD-10-CM

## 2024-11-01 DIAGNOSIS — M54.50 BILATERAL LOW BACK PAIN WITHOUT SCIATICA, UNSPECIFIED CHRONICITY: ICD-10-CM

## 2024-11-01 DIAGNOSIS — G89.29 CHRONIC BILATERAL LOW BACK PAIN WITHOUT SCIATICA: Primary | ICD-10-CM

## 2024-11-01 DIAGNOSIS — M54.50 CHRONIC BILATERAL LOW BACK PAIN WITHOUT SCIATICA: Primary | ICD-10-CM

## 2024-11-01 DIAGNOSIS — M54.6 ACUTE LEFT-SIDED THORACIC BACK PAIN: ICD-10-CM

## 2024-11-01 PROCEDURE — 97140 MANUAL THERAPY 1/> REGIONS: CPT | Mod: GP | Performed by: PHYSICAL THERAPIST

## 2024-11-01 PROCEDURE — 97110 THERAPEUTIC EXERCISES: CPT | Mod: GP | Performed by: PHYSICAL THERAPIST

## 2024-11-01 RX ORDER — NAPROXEN 500 MG/1
TABLET ORAL
Qty: 30 TABLET | Refills: 1 | Status: SHIPPED | OUTPATIENT
Start: 2024-11-01

## 2024-11-03 DIAGNOSIS — R13.19 ESOPHAGEAL DYSPHAGIA: ICD-10-CM

## 2024-11-22 DIAGNOSIS — M62.838 MUSCLE SPASM: ICD-10-CM

## 2024-11-23 RX ORDER — METHOCARBAMOL 750 MG/1
TABLET, FILM COATED ORAL
Qty: 90 TABLET | Refills: 0 | Status: SHIPPED | OUTPATIENT
Start: 2024-11-23

## 2024-11-30 DIAGNOSIS — M54.6 MIDLINE THORACIC BACK PAIN, UNSPECIFIED CHRONICITY: ICD-10-CM

## 2024-11-30 DIAGNOSIS — M54.50 BILATERAL LOW BACK PAIN WITHOUT SCIATICA, UNSPECIFIED CHRONICITY: ICD-10-CM

## 2024-12-02 RX ORDER — NAPROXEN 500 MG/1
TABLET ORAL
Qty: 60 TABLET | Refills: 1 | Status: SHIPPED | OUTPATIENT
Start: 2024-12-02

## 2024-12-17 ENCOUNTER — OFFICE VISIT (OUTPATIENT)
Dept: OPTOMETRY | Facility: CLINIC | Age: 44
End: 2024-12-17
Payer: MEDICARE

## 2024-12-17 DIAGNOSIS — H52.13 HIGH MYOPIA, BOTH EYES: ICD-10-CM

## 2024-12-17 DIAGNOSIS — Z01.00 ENCOUNTER FOR EXAMINATION OF EYES AND VISION WITHOUT ABNORMAL FINDINGS: Primary | ICD-10-CM

## 2024-12-17 DIAGNOSIS — H52.223 REGULAR ASTIGMATISM OF BOTH EYES: ICD-10-CM

## 2024-12-17 PROCEDURE — 92014 COMPRE OPH EXAM EST PT 1/>: CPT | Performed by: OPTOMETRIST

## 2024-12-17 PROCEDURE — 92015 DETERMINE REFRACTIVE STATE: CPT | Mod: GY | Performed by: OPTOMETRIST

## 2024-12-17 ASSESSMENT — REFRACTION_MANIFEST
OS_CYLINDER: +1.00
OD_SPHERE: -7.25
OS_SPHERE: -8.25
OD_AXIS: 121
OS_AXIS: 077
OD_CYLINDER: +1.00

## 2024-12-17 ASSESSMENT — CONF VISUAL FIELD
OD_NORMAL: 1
OD_INFERIOR_TEMPORAL_RESTRICTION: 0
OS_SUPERIOR_TEMPORAL_RESTRICTION: 0
OD_SUPERIOR_TEMPORAL_RESTRICTION: 0
OD_SUPERIOR_NASAL_RESTRICTION: 0
OS_SUPERIOR_NASAL_RESTRICTION: 0
METHOD: COUNTING FINGERS
OS_NORMAL: 1
OD_INFERIOR_NASAL_RESTRICTION: 0
OS_INFERIOR_NASAL_RESTRICTION: 0
OS_INFERIOR_TEMPORAL_RESTRICTION: 0

## 2024-12-17 ASSESSMENT — REFRACTION_WEARINGRX
SPECS_TYPE: SVL
OD_SPHERE: -6.75
OD_CYLINDER: +1.00
OD_AXIS: 112
OS_CYLINDER: +0.75
OS_AXIS: 083
OS_SPHERE: -7.75

## 2024-12-17 ASSESSMENT — SLIT LAMP EXAM - LIDS
COMMENTS: NORMAL
COMMENTS: NORMAL

## 2024-12-17 ASSESSMENT — VISUAL ACUITY
OS_SC: CF @ 3'
OS_CC: 20/20
OD_SC: 20/500
OS_SC: 20/20-1
CORRECTION_TYPE: GLASSES
OD_SC: 20/20-2
OD_CC: 20/20
METHOD: SNELLEN - LINEAR
OS_CC+: -1
OD_CC: 20/20
OS_CC: 20/20

## 2024-12-17 ASSESSMENT — TONOMETRY
OS_IOP_MMHG: 20
OD_IOP_MMHG: 20
IOP_METHOD: APPLANATION

## 2024-12-17 ASSESSMENT — EXTERNAL EXAM - RIGHT EYE: OD_EXAM: NORMAL

## 2024-12-17 ASSESSMENT — EXTERNAL EXAM - LEFT EYE: OS_EXAM: NORMAL

## 2024-12-17 ASSESSMENT — CUP TO DISC RATIO
OD_RATIO: 0.2
OS_RATIO: 0.2

## 2024-12-17 NOTE — PATIENT INSTRUCTIONS
Updated glasses prescription provided today.   Allow 2 weeks to adapt to the new glasses.     Return in 1 year for a comprehensive eye exam, or sooner if needed.      The effects of the dilating drops last for 4- 6 hours.  You will be more sensitive to light and vision will be blurry up close.  Mydriatic sunglasses were given if needed.     Kiel Mckenzie, OD  [facility]  [address] [place] [address]. [place]  [id number]    [phone]

## 2024-12-17 NOTE — PROGRESS NOTES
Chief Complaint   Patient presents with    Diabetic Eye Exam        Chief Complaint(s) and History of Present Illness(es)       Diabetic Eye Exam              Diabetes Type: controlled with diet (Prediabetes)    Blood Sugars: Doesn't check                   Lab Results   Component Value Date    A1C ERROR PATIENT NOT AVAILABLE FOR RETEST 12/23/2014       Last Eye Exam: 11/6/2023 Dr. Mckenzie   Dilated Previously: Yes, side effects of dilation explained today    What are you currently using to see?  Glasses - SVL - wears full time     -States his lenses have had peeling and distortion since he picked them up - purchased at [employer] [place]     Distance Vision Acuity: Noticed gradual change in both eyes - hard to tell if vision has changed or if blurriness is related to lenses     Near Vision Acuity: Not satisfied     Eye Comfort: dry and itchy  Do you use eye drops? : No  Occupation or Hobbies:  - 2+ hrs/day on screen    Christy Carr  Optometry Assistant     Medical, surgical and family histories reviewed and updated 12/17/2024.       OBJECTIVE: See Ophthalmology exam    ASSESSMENT:  No diagnosis found.    PLAN:    Raaf Cochran aware  eye exam results will be sent to No Ref-Primary, Physician.  There are no Patient Instructions on file for this visit.

## 2024-12-17 NOTE — LETTER
12/17/2024      Rafa Cochran  [address]  [place] [address]      Dear Colleague,    Thank you for referring your patient, Rafa Cochran, to the [facility]. Please see a copy of my visit note below.    Chief Complaint   Patient presents with     Diabetic Eye Exam        Chief Complaint(s) and History of Present Illness(es)       Diabetic Eye Exam              Diabetes Type: controlled with diet (Prediabetes)    Blood Sugars: Doesn't check                   Lab Results   Component Value Date    A1C ERROR PATIENT NOT AVAILABLE FOR RETEST 12/23/2014       Last Eye Exam: 11/6/2023 Dr. Mckenzie   Dilated Previously: Yes, side effects of dilation explained today    What are you currently using to see?  Glasses - SVL - wears full time     -States his lenses have had peeling and distortion since he picked them up - purchased at [facility]     Distance Vision Acuity: Noticed gradual change in both eyes - hard to tell if vision has changed or if blurriness is related to lenses     Near Vision Acuity: Not satisfied     Eye Comfort: dry and itchy  Do you use eye drops? : No  Occupation or Hobbies:  - 2+ hrs/day on screen    Christy Carr  Optometry Assistant     Medical, surgical and family histories reviewed and updated 12/17/2024.       OBJECTIVE: See Ophthalmology exam    ASSESSMENT:  No diagnosis found.    PLAN:    Rafa Cochran aware  eye exam results will be sent to No Ref-Primary, Physician.  There are no Patient Instructions on file for this visit.          Again, thank you for allowing me to participate in the care of your patient.        Sincerely,        Kiel Mckenzie, OD

## 2024-12-24 ENCOUNTER — NURSE TRIAGE (OUTPATIENT)
Dept: NURSING | Facility: CLINIC | Age: 44
End: 2024-12-24
Payer: MEDICARE

## 2024-12-24 NOTE — TELEPHONE ENCOUNTER
"Nurse Triage SBAR    Is this a 2nd Level Triage? NO    Situation: Vomiting and diarrhea    Background: Pre diabetic    Assessment: Patient has had severe vomiting and diarrhea since last night.  At top of call, patient vomited several times before triage could start.  Patient not sure when he voided last, and has dry mouth. He says he hasn't had a PCP in a while.      Protocol Recommended Disposition:   Go to ED Now (Or PCP Triage)    Recommendation: Patient verbalizes understanding of care advice and agrees with plan.    Priyanka Maxwell RN  [facility]         Reason for Disposition   [1] SEVERE vomiting (e.g., 6 or more times/day) AND [2] present > 8 hours (Exception: Patient sounds well, is drinking liquids, does not sound dehydrated, and vomiting has lasted less than 24 hours.)    Additional Information   Negative: Shock suspected (e.g., cold/pale/clammy skin, too weak to stand, low BP, rapid pulse)   Negative: Difficult to awaken or acting confused (e.g., disoriented, slurred speech)   Negative: Sounds like a life-threatening emergency to the triager   Negative: Vomiting (or Nausea) in a cancer patient who is currently (or recently) receiving chemotherapy or radiation therapy, or cancer patient who has metastatic or end-stage cancer and is receiving palliative care   Negative: [1] Vomiting AND [2] contains red blood or black (\"coffee ground\") material  (Exception: Few red streaks in vomit that only happened once.)   Negative: Severe pain in one eye   Negative: Recent head injury (within last 3 days)   Negative: Recent abdominal injury (within last 3 days)   Negative: [1] Insulin-dependent diabetes (Type I) AND [2] glucose > 400 mg/dl (22 mmol/l)   Negative: [1] Vomiting AND [2] hernia is more painful or swollen than usual   Negative: [1] MODERATE vomiting (e.g., 3 - 5 times/day) AND [2] age > 60 years   Negative: Severe headache (e.g., excruciating)  (Exception: Similar to previous " migraines.)    Protocols used: Vomiting-A-AH

## 2024-12-26 DIAGNOSIS — M62.838 MUSCLE SPASM: ICD-10-CM

## 2024-12-26 RX ORDER — METHOCARBAMOL 750 MG/1
TABLET, FILM COATED ORAL
Qty: 90 TABLET | Refills: 0 | Status: SHIPPED | OUTPATIENT
Start: 2024-12-26

## 2025-01-07 ENCOUNTER — APPOINTMENT (OUTPATIENT)
Dept: OPTOMETRY | Facility: CLINIC | Age: 45
End: 2025-01-07
Payer: MEDICARE

## 2025-01-07 PROCEDURE — 92340 FIT SPECTACLES MONOFOCAL: CPT | Performed by: OPTOMETRIST

## 2025-01-09 ENCOUNTER — NURSE TRIAGE (OUTPATIENT)
Dept: FAMILY MEDICINE | Facility: CLINIC | Age: 45
End: 2025-01-09
Payer: MEDICARE

## 2025-01-09 NOTE — TELEPHONE ENCOUNTER
"Nurse Triage SBAR    Is this a 2nd Level Triage? NO    Situation: 2 Boils on right shoulder blade/back area.    Background:   Patient has had boils in the past but mainly on his pants line.    Assessment:   Patient has 2 boils on his upper right back.  Patient unsure when these boils showed up.   Patient laid down 2 nights ago and felt them as they were slightly painful. Patient took PRN tylenol for the pain.   Denies fever, temp today was 98.2.  2 boils 1 being the size of a \"fingernail\" about 1/2\" and the other being 1/4\"    Looks filled with fluid, center is hard and its mainly red/dark red in color.     Denies drainage from boil.    Pain is 2/10.    Patient tried to pop the boil yesterday but felt pain.     RN advised patient to clean boil and not touch it until its assessed by a provider.    If patient develops increased pain, fever, discharge or bleeding to go to urgent care. Patient understood.    Protocol Recommended Disposition:   See in Office Today or Tomorrow    Recommendation: Be seen- appointment scheduled for tomorrow with Dr. Hanny Childs, RN  [facility]        Reason for Disposition   2 or more boils    Additional Information   Negative: Painful lump or swelling at opening to anus (rectum)   Negative: Painful lump or swelling at opening to vagina (on labia)   Negative: Painful lump or swelling on scrotum   Negative: Doesn't match the SYMPTOMS of a boil   Negative: Widespread rash and bright red, sunburn-like and too weak to stand   Negative: Sounds like a life-threatening emergency to the triager   Negative: Widespread red rash   Negative: Black (necrotic), dark purple, or blisters develop in area of boil   Negative: Patient sounds very sick or weak to the triager   Negative: SEVERE pain (e.g., excruciating)   Negative: Red streak from area of infection   Negative: Fever > 100.4 F (38.0 C)   Negative: Boil > 2 inches across (> 5 cm; larger than a golf ball or ping pong " "ball)   Negative: Boil > 1/2 inch across (> 12 mm; larger than a marble) and center is soft or pus colored   Negative: Spreading redness around the boil and no fever   Negative: Boil and diabetes mellitus or weak immune system (e.g., HIV positive, cancer chemo, splenectomy, organ transplant, chronic steroids)   Negative: Boil > 1/4 inch across (> 6 mm; larger than a pencil eraser) and on face   Negative: Boil overlying a joint    Answer Assessment - Initial Assessment Questions  1. APPEARANCE of BOIL: \"What does the boil look like?\"       Red in color with a red/dark red ring around them.  2. LOCATION: \"Where is the boil located?\"       Shoulder blade area- right side.  3. NUMBER: \"How many boils are there?\"       2 that are bugging him  4. SIZE: \"How big is the boil?\" (e.g., inches, cm; compare to size of a coin or other object)      Size of a \"fingernail\" and half a nail  5. ONSET: \"When did the boil start?\"      Patient is really unsure when he noticed them  6. PAIN: \"Is there any pain?\" If Yes, ask: \"How bad is the pain?\"   (Scale 1-10; or mild, moderate, severe)      2/10 pain   7. FEVER: \"Do you have a fever?\" If Yes, ask: \"What is it, how was it measured, and when did it start?\"       No fever- 98.2 this morning.  8. SOURCE: \"Have you been around anyone with boils or other Staph infections?\" \"Have you ever had boils before?\"      Boils in the past- these are normally on his belt line.  9. OTHER SYMPTOMS: \"Do you have any other symptoms?\" (e.g., shaking chills, weakness, rash elsewhere on body)      No drainage   10. PREGNANCY: \"Is there any chance you are pregnant?\" \"When was your last menstrual period?\"        no    Protocols used: Boil (Skin Abscess)-A-[place]    "

## 2025-01-13 ENCOUNTER — TELEPHONE (OUTPATIENT)
Dept: FAMILY MEDICINE | Facility: CLINIC | Age: 45
End: 2025-01-13
Payer: MEDICARE

## 2025-01-13 DIAGNOSIS — L70.9 ACNE, UNSPECIFIED ACNE TYPE: Primary | ICD-10-CM

## 2025-01-13 NOTE — TELEPHONE ENCOUNTER
Clindamycin 1% lotion 60ML not covered by patient plan, the preferred alternative is Clindamycingel ,Clindamycinsol

## 2025-01-14 RX ORDER — CLINDAMYCIN PHOSPHATE 10 MG/G
GEL TOPICAL 2 TIMES DAILY
Qty: 120 G | Refills: 11 | Status: SHIPPED | OUTPATIENT
Start: 2025-01-14

## 2025-01-27 DIAGNOSIS — R13.19 ESOPHAGEAL DYSPHAGIA: ICD-10-CM

## 2025-01-28 DIAGNOSIS — M62.838 MUSCLE SPASM: ICD-10-CM

## 2025-01-28 RX ORDER — METHOCARBAMOL 750 MG/1
TABLET, FILM COATED ORAL
Qty: 90 TABLET | Refills: 0 | OUTPATIENT
Start: 2025-01-28

## 2025-01-29 ENCOUNTER — MYC REFILL (OUTPATIENT)
Dept: FAMILY MEDICINE | Facility: CLINIC | Age: 45
End: 2025-01-29
Payer: MEDICARE

## 2025-01-29 DIAGNOSIS — M62.838 MUSCLE SPASM: ICD-10-CM

## 2025-01-29 RX ORDER — METHOCARBAMOL 750 MG/1
750 TABLET, FILM COATED ORAL 4 TIMES DAILY PRN
Qty: 180 TABLET | Refills: 0 | Status: SHIPPED | OUTPATIENT
Start: 2025-01-29

## 2025-04-21 DIAGNOSIS — M62.838 MUSCLE SPASM: ICD-10-CM

## 2025-04-21 RX ORDER — METHOCARBAMOL 750 MG/1
TABLET, FILM COATED ORAL
Qty: 180 TABLET | Refills: 0 | Status: SHIPPED | OUTPATIENT
Start: 2025-04-21

## 2025-04-27 DIAGNOSIS — R13.19 ESOPHAGEAL DYSPHAGIA: ICD-10-CM

## 2025-04-28 RX ORDER — OMEPRAZOLE 20 MG/1
20 CAPSULE, DELAYED RELEASE ORAL DAILY
Qty: 90 CAPSULE | Refills: 0 | Status: SHIPPED | OUTPATIENT
Start: 2025-04-28

## 2025-07-13 ENCOUNTER — HEALTH MAINTENANCE LETTER (OUTPATIENT)
Age: 45
End: 2025-07-13

## 2025-08-20 DIAGNOSIS — M62.838 MUSCLE SPASM: ICD-10-CM

## 2025-08-21 RX ORDER — METHOCARBAMOL 750 MG/1
TABLET, FILM COATED ORAL
Qty: 180 TABLET | Refills: 0 | OUTPATIENT
Start: 2025-08-21

## (undated) RX ORDER — PROPOFOL 10 MG/ML
INJECTION, EMULSION INTRAVENOUS
Status: DISPENSED
Start: 2019-11-25